# Patient Record
Sex: MALE | Race: ASIAN | NOT HISPANIC OR LATINO | URBAN - METROPOLITAN AREA
[De-identification: names, ages, dates, MRNs, and addresses within clinical notes are randomized per-mention and may not be internally consistent; named-entity substitution may affect disease eponyms.]

---

## 2017-07-12 ENCOUNTER — INPATIENT (INPATIENT)
Facility: HOSPITAL | Age: 82
LOS: 0 days | Discharge: HOME | End: 2017-07-13
Attending: INTERNAL MEDICINE | Admitting: INTERNAL MEDICINE
Payer: MEDICARE

## 2017-07-12 VITALS
OXYGEN SATURATION: 96 % | RESPIRATION RATE: 18 BRPM | TEMPERATURE: 97 F | HEART RATE: 77 BPM | DIASTOLIC BLOOD PRESSURE: 65 MMHG | SYSTOLIC BLOOD PRESSURE: 169 MMHG

## 2017-07-12 DIAGNOSIS — Z92.21 PERSONAL HISTORY OF ANTINEOPLASTIC CHEMOTHERAPY: ICD-10-CM

## 2017-07-12 PROCEDURE — 93010 ELECTROCARDIOGRAM REPORT: CPT

## 2017-07-12 RX ORDER — DEXTROSE 50 % IN WATER 50 %
25 SYRINGE (ML) INTRAVENOUS ONCE
Qty: 0 | Refills: 0 | Status: DISCONTINUED | OUTPATIENT
Start: 2017-07-12 | End: 2017-07-13

## 2017-07-12 RX ORDER — DIPHENHYDRAMINE HCL 50 MG
50 CAPSULE ORAL ONCE
Qty: 0 | Refills: 0 | Status: COMPLETED | OUTPATIENT
Start: 2017-07-12 | End: 2017-07-12

## 2017-07-12 RX ORDER — PANTOPRAZOLE SODIUM 20 MG/1
40 TABLET, DELAYED RELEASE ORAL
Qty: 0 | Refills: 0 | Status: DISCONTINUED | OUTPATIENT
Start: 2017-07-12 | End: 2017-07-13

## 2017-07-12 RX ORDER — INSULIN LISPRO 100/ML
VIAL (ML) SUBCUTANEOUS
Qty: 0 | Refills: 0 | Status: DISCONTINUED | OUTPATIENT
Start: 2017-07-12 | End: 2017-07-13

## 2017-07-12 RX ORDER — ATORVASTATIN CALCIUM 80 MG/1
20 TABLET, FILM COATED ORAL AT BEDTIME
Qty: 0 | Refills: 0 | Status: DISCONTINUED | OUTPATIENT
Start: 2017-07-12 | End: 2017-07-13

## 2017-07-12 RX ORDER — SODIUM CHLORIDE 9 MG/ML
500 INJECTION INTRAMUSCULAR; INTRAVENOUS; SUBCUTANEOUS
Qty: 0 | Refills: 0 | Status: DISCONTINUED | OUTPATIENT
Start: 2017-07-12 | End: 2017-07-13

## 2017-07-12 RX ORDER — HEPARIN SODIUM 5000 [USP'U]/ML
5000 INJECTION INTRAVENOUS; SUBCUTANEOUS EVERY 12 HOURS
Qty: 0 | Refills: 0 | Status: DISCONTINUED | OUTPATIENT
Start: 2017-07-12 | End: 2017-07-13

## 2017-07-12 RX ORDER — RANOLAZINE 500 MG/1
1000 TABLET, FILM COATED, EXTENDED RELEASE ORAL
Qty: 0 | Refills: 0 | Status: DISCONTINUED | OUTPATIENT
Start: 2017-07-12 | End: 2017-07-13

## 2017-07-12 RX ORDER — FOLIC ACID 0.8 MG
1 TABLET ORAL DAILY
Qty: 0 | Refills: 0 | Status: DISCONTINUED | OUTPATIENT
Start: 2017-07-12 | End: 2017-07-13

## 2017-07-12 RX ORDER — TENOFOVIR DISOPROXIL FUMARATE 300 MG/1
300 TABLET, FILM COATED ORAL DAILY
Qty: 0 | Refills: 0 | Status: DISCONTINUED | OUTPATIENT
Start: 2017-07-12 | End: 2017-07-13

## 2017-07-12 RX ORDER — TICAGRELOR 90 MG/1
60 TABLET ORAL
Qty: 0 | Refills: 0 | Status: DISCONTINUED | OUTPATIENT
Start: 2017-07-12 | End: 2017-07-13

## 2017-07-12 RX ORDER — TAMSULOSIN HYDROCHLORIDE 0.4 MG/1
0.4 CAPSULE ORAL AT BEDTIME
Qty: 0 | Refills: 0 | Status: DISCONTINUED | OUTPATIENT
Start: 2017-07-12 | End: 2017-07-13

## 2017-07-12 RX ORDER — SODIUM CHLORIDE 9 MG/ML
3 INJECTION INTRAMUSCULAR; INTRAVENOUS; SUBCUTANEOUS EVERY 8 HOURS
Qty: 0 | Refills: 0 | Status: DISCONTINUED | OUTPATIENT
Start: 2017-07-12 | End: 2017-07-13

## 2017-07-12 RX ORDER — HYDROCORTISONE 20 MG
100 TABLET ORAL ONCE
Qty: 0 | Refills: 0 | Status: COMPLETED | OUTPATIENT
Start: 2017-07-12 | End: 2017-07-12

## 2017-07-12 RX ORDER — SODIUM CHLORIDE 9 MG/ML
1000 INJECTION, SOLUTION INTRAVENOUS
Qty: 0 | Refills: 0 | Status: DISCONTINUED | OUTPATIENT
Start: 2017-07-12 | End: 2017-07-13

## 2017-07-12 RX ORDER — DIPHENHYDRAMINE HCL 50 MG
25 CAPSULE ORAL ONCE
Qty: 0 | Refills: 0 | Status: DISCONTINUED | OUTPATIENT
Start: 2017-07-12 | End: 2017-07-12

## 2017-07-12 RX ORDER — INSULIN LISPRO 100/ML
VIAL (ML) SUBCUTANEOUS AT BEDTIME
Qty: 0 | Refills: 0 | Status: DISCONTINUED | OUTPATIENT
Start: 2017-07-12 | End: 2017-07-13

## 2017-07-12 RX ORDER — ASPIRIN/CALCIUM CARB/MAGNESIUM 324 MG
81 TABLET ORAL DAILY
Qty: 0 | Refills: 0 | Status: DISCONTINUED | OUTPATIENT
Start: 2017-07-12 | End: 2017-07-13

## 2017-07-12 RX ORDER — ALLOPURINOL 300 MG
100 TABLET ORAL DAILY
Qty: 0 | Refills: 0 | Status: DISCONTINUED | OUTPATIENT
Start: 2017-07-12 | End: 2017-07-13

## 2017-07-12 RX ORDER — FUROSEMIDE 40 MG
20 TABLET ORAL DAILY
Qty: 0 | Refills: 0 | Status: DISCONTINUED | OUTPATIENT
Start: 2017-07-12 | End: 2017-07-13

## 2017-07-12 RX ORDER — ACETAMINOPHEN 500 MG
650 TABLET ORAL EVERY 6 HOURS
Qty: 0 | Refills: 0 | Status: DISCONTINUED | OUTPATIENT
Start: 2017-07-12 | End: 2017-07-13

## 2017-07-12 RX ORDER — DEXTROSE 50 % IN WATER 50 %
12.5 SYRINGE (ML) INTRAVENOUS ONCE
Qty: 0 | Refills: 0 | Status: DISCONTINUED | OUTPATIENT
Start: 2017-07-12 | End: 2017-07-13

## 2017-07-12 RX ORDER — DEXTROSE 50 % IN WATER 50 %
1 SYRINGE (ML) INTRAVENOUS ONCE
Qty: 0 | Refills: 0 | Status: DISCONTINUED | OUTPATIENT
Start: 2017-07-12 | End: 2017-07-13

## 2017-07-12 RX ORDER — AMLODIPINE BESYLATE 2.5 MG/1
5 TABLET ORAL DAILY
Qty: 0 | Refills: 0 | Status: DISCONTINUED | OUTPATIENT
Start: 2017-07-12 | End: 2017-07-13

## 2017-07-12 RX ORDER — METOPROLOL TARTRATE 50 MG
25 TABLET ORAL
Qty: 0 | Refills: 0 | Status: DISCONTINUED | OUTPATIENT
Start: 2017-07-12 | End: 2017-07-13

## 2017-07-12 RX ORDER — GLUCAGON INJECTION, SOLUTION 0.5 MG/.1ML
1 INJECTION, SOLUTION SUBCUTANEOUS ONCE
Qty: 0 | Refills: 0 | Status: DISCONTINUED | OUTPATIENT
Start: 2017-07-12 | End: 2017-07-13

## 2017-07-12 RX ADMIN — RANOLAZINE 1000 MILLIGRAM(S): 500 TABLET, FILM COATED, EXTENDED RELEASE ORAL at 22:09

## 2017-07-12 RX ADMIN — Medication 100 MILLIGRAM(S): at 18:25

## 2017-07-12 RX ADMIN — Medication 25 MILLIGRAM(S): at 19:35

## 2017-07-12 RX ADMIN — TAMSULOSIN HYDROCHLORIDE 0.4 MILLIGRAM(S): 0.4 CAPSULE ORAL at 22:09

## 2017-07-12 RX ADMIN — Medication 50 MILLIGRAM(S): at 18:25

## 2017-07-12 RX ADMIN — TICAGRELOR 60 MILLIGRAM(S): 90 TABLET ORAL at 22:09

## 2017-07-12 RX ADMIN — SODIUM CHLORIDE 60 MILLILITER(S): 9 INJECTION INTRAMUSCULAR; INTRAVENOUS; SUBCUTANEOUS at 19:15

## 2017-07-12 RX ADMIN — SODIUM CHLORIDE 3 MILLILITER(S): 9 INJECTION INTRAMUSCULAR; INTRAVENOUS; SUBCUTANEOUS at 21:23

## 2017-07-12 RX ADMIN — ATORVASTATIN CALCIUM 20 MILLIGRAM(S): 80 TABLET, FILM COATED ORAL at 22:09

## 2017-07-12 NOTE — H&P CARDIOLOGY - RS GEN PE MLT RESP DETAILS PC
good air movement/no chest wall tenderness/breath sounds equal/airway patent/respirations non-labored/clear to auscultation bilaterally

## 2017-07-12 NOTE — H&P CARDIOLOGY - HISTORY OF PRESENT ILLNESS
81 y/o M w/ PMH of HTN, HLD, HFpEF 66%, BPH, CKD, ?Hep. B, CAD and DM being transferred to [facility] from [facility] for cardiac catheretization. Pt was admitted for worsening SOB with acute CHF exacerbation and was diuresed with IV Lasix. While being admitted pt had episode of NSVT which was worked up with a NST and TTE. Pt's echocardiogram showed EF 66%, normal LV function, mild LVH, Grade 1 diastolic dysfunction, sclerotic aortic valve, mild MR and a small pericardial effusion. Pt's NST showed ischemia in the apical segment suggesting ischemia in the LAD region. Pt denies N/V/D, fevers, chills, cough, palpitations, chest pain, substernal distress, syncope, cyanosis, heart murmurs, varicosities, phlebitis, claudication.

## 2017-07-12 NOTE — H&P CARDIOLOGY - PMH
CHF (congestive heart failure)    CKD (chronic kidney disease)    Diabetes mellitus    HTN (hypertension)

## 2017-07-12 NOTE — H&P CARDIOLOGY - NEGATIVE NEUROLOGICAL SYMPTOMS
no transient paralysis/no loss of sensation/no weakness/no loss of consciousness/no hemiparesis/no difficulty walking/no paresthesias/no facial palsy/no vertigo/no generalized seizures/no headache/no focal seizures/no tremors/no confusion/no syncope

## 2017-07-12 NOTE — CHART NOTE - NSCHARTNOTEFT_GEN_A_CORE
83 y/o male s/p cardiac cath for right groin check. Pt currently without any complaints.    Right groin: Site with dressing in place, c/d/i. No swelling/edema, coldness of extremity, or discoloration of extremity. Pulses and sensation intact.    Will cont to monitor

## 2017-07-13 VITALS — WEIGHT: 143.3 LBS

## 2017-07-13 LAB
BUN SERPL-MCNC: 27 MG/DL — HIGH (ref 7–23)
CALCIUM SERPL-MCNC: 8.7 MG/DL — SIGNIFICANT CHANGE UP (ref 8.4–10.5)
CHLORIDE SERPL-SCNC: 102 MMOL/L — SIGNIFICANT CHANGE UP (ref 98–107)
CO2 SERPL-SCNC: 13 MMOL/L — LOW (ref 22–31)
CREAT SERPL-MCNC: 1.45 MG/DL — HIGH (ref 0.5–1.3)
GLUCOSE SERPL-MCNC: 97 MG/DL — SIGNIFICANT CHANGE UP (ref 70–99)
HCT VFR BLD CALC: 38.5 % — LOW (ref 39–50)
HGB BLD-MCNC: 12.3 G/DL — LOW (ref 13–17)
MAGNESIUM SERPL-MCNC: 2.3 MG/DL — SIGNIFICANT CHANGE UP (ref 1.6–2.6)
MCHC RBC-ENTMCNC: 31.9 % — LOW (ref 32–36)
MCHC RBC-ENTMCNC: 33.7 PG — SIGNIFICANT CHANGE UP (ref 27–34)
MCV RBC AUTO: 105.5 FL — HIGH (ref 80–100)
NRBC # FLD: 0 — SIGNIFICANT CHANGE UP
PHOSPHATE SERPL-MCNC: 2.9 MG/DL — SIGNIFICANT CHANGE UP (ref 2.5–4.5)
PLATELET # BLD AUTO: 222 K/UL — SIGNIFICANT CHANGE UP (ref 150–400)
PMV BLD: 11.1 FL — SIGNIFICANT CHANGE UP (ref 7–13)
POTASSIUM SERPL-MCNC: 4.8 MMOL/L — SIGNIFICANT CHANGE UP (ref 3.5–5.3)
POTASSIUM SERPL-SCNC: 4.8 MMOL/L — SIGNIFICANT CHANGE UP (ref 3.5–5.3)
RBC # BLD: 3.65 M/UL — LOW (ref 4.2–5.8)
RBC # FLD: 13.3 % — SIGNIFICANT CHANGE UP (ref 10.3–14.5)
SODIUM SERPL-SCNC: 137 MMOL/L — SIGNIFICANT CHANGE UP (ref 135–145)
WBC # BLD: 8.73 K/UL — SIGNIFICANT CHANGE UP (ref 3.8–10.5)
WBC # FLD AUTO: 8.73 K/UL — SIGNIFICANT CHANGE UP (ref 3.8–10.5)

## 2017-07-13 RX ORDER — ACETAMINOPHEN 500 MG
2 TABLET ORAL
Qty: 0 | Refills: 0 | COMMUNITY
Start: 2017-07-13

## 2017-07-13 RX ORDER — LOSARTAN POTASSIUM 100 MG/1
1 TABLET, FILM COATED ORAL
Qty: 0 | Refills: 0 | COMMUNITY

## 2017-07-13 RX ADMIN — SODIUM CHLORIDE 3 MILLILITER(S): 9 INJECTION INTRAMUSCULAR; INTRAVENOUS; SUBCUTANEOUS at 13:00

## 2017-07-13 RX ADMIN — Medication 25 MILLIGRAM(S): at 06:08

## 2017-07-13 RX ADMIN — PANTOPRAZOLE SODIUM 40 MILLIGRAM(S): 20 TABLET, DELAYED RELEASE ORAL at 06:08

## 2017-07-13 RX ADMIN — Medication 1 MILLIGRAM(S): at 12:05

## 2017-07-13 RX ADMIN — Medication: at 12:45

## 2017-07-13 RX ADMIN — TICAGRELOR 60 MILLIGRAM(S): 90 TABLET ORAL at 06:08

## 2017-07-13 RX ADMIN — Medication 81 MILLIGRAM(S): at 12:05

## 2017-07-13 RX ADMIN — RANOLAZINE 1000 MILLIGRAM(S): 500 TABLET, FILM COATED, EXTENDED RELEASE ORAL at 10:09

## 2017-07-13 RX ADMIN — AMLODIPINE BESYLATE 5 MILLIGRAM(S): 2.5 TABLET ORAL at 06:08

## 2017-07-13 RX ADMIN — Medication 20 MILLIGRAM(S): at 06:08

## 2017-07-13 RX ADMIN — TENOFOVIR DISOPROXIL FUMARATE 300 MILLIGRAM(S): 300 TABLET, FILM COATED ORAL at 12:05

## 2017-07-13 RX ADMIN — HEPARIN SODIUM 5000 UNIT(S): 5000 INJECTION INTRAVENOUS; SUBCUTANEOUS at 06:08

## 2017-07-13 RX ADMIN — Medication 100 MILLIGRAM(S): at 12:05

## 2017-07-13 RX ADMIN — SODIUM CHLORIDE 3 MILLILITER(S): 9 INJECTION INTRAMUSCULAR; INTRAVENOUS; SUBCUTANEOUS at 06:10

## 2017-07-13 NOTE — DISCHARGE NOTE ADULT - MEDICATION SUMMARY - MEDICATIONS TO TAKE
I will START or STAY ON the medications listed below when I get home from the hospital:    aspirin 81 mg oral tablet  -- 1 tab(s) by mouth once a day  -- Indication: For Coronary artery disease     acetaminophen 325 mg oral tablet  -- 2 tab(s) by mouth every 6 hours, As needed, Mild, Moderate and Severe Pain  -- Indication: For Pain    tamsulosin 0.4 mg oral capsule  -- 1 cap(s) by mouth once a day  -- Indication: For BPH    ranolazine 1000 mg oral tablet, extended release  -- 1 tab(s) by mouth 2 times a day  -- Indication: For Antianginal     glimepiride 1 mg oral tablet  -- 1 tab(s) by mouth 2 times a day  -- Indication: For Diabetes mellitus    allopurinol 100 mg oral tablet  -- 1 tab(s) by mouth once a day  -- Indication: For Gout    atorvastatin 20 mg oral tablet  -- 1 tab(s) by mouth once a day  -- Indication: For Hyperlipidemia     Brilinta (ticagrelor) 60 mg oral tablet  -- 1 tab(s) by mouth 2 times a day  -- Indication: For Coronary artery disease     tenofovir 300 mg oral tablet  -- 1 tab(s) by mouth once a day  -- Indication: For Antiviral     metoprolol tartrate 25 mg oral tablet  -- 1 tab(s) by mouth 2 times a day  -- Indication: For HTN (hypertension)    amLODIPine 5 mg oral tablet  -- 1 tab(s) by mouth once a day  -- Indication: For HTN (hypertension)    Lasix 20 mg oral tablet  -- 1 tab(s) by mouth once a day  -- Indication: For CHF (congestive heart failure)    azelastine 0.05% ophthalmic solution  -- 1 drop(s) to each affected eye 2 times a day  -- Indication: For Eye drop    pantoprazole 40 mg oral delayed release tablet  -- 1 tab(s) by mouth once a day  -- Indication: For GERD    folic acid 1 mg oral tablet  -- 1 tab(s) by mouth once a day  -- Indication: For Vitamin

## 2017-07-13 NOTE — DISCHARGE NOTE ADULT - HOSPITAL COURSE
83 y/o M w/ PMH of HTN, HLD, HFpEF 66%, BPH, CKD, ?Hep. B, CAD and DM being transferred to [facility] from [facility] for cardiac catheterization.    + 7/12 s/p LHC: LAD patent stents, RCA patent stents, RFA accessed    Cr: 1.5  LHC: LAD patent stents, RCA patent stents, RFA accessed    7/13 Med:   continue rest of medical management  bleeding precaution  rehab PT  social work - discharge planning    7/13/17 Pt is medically stable for discharge home today as per Dr. Rebollar. 81 y/o M w/ PMH of HTN, HLD, HFpEF 66%, BPH, CKD, ?Hep. B, CAD and DM being transferred to [facility] from [facility] for cardiac catheterization.    + 7/12 s/p LHC: LAD patent stents, RCA patent stents, RFA accessed.    Trend Cr s/p LHC    Pt had allergic reaction to contrast s/p LHC(Given 50mg IV Benadryl and 100mg IV Solucortef), now resolved.     Cr: 1.5  LHC: LAD patent stents, RCA patent stents, RFA accessed    7/13 Med:   continue rest of medical management  bleeding precaution  rehab PT  social work - discharge planning.    7/13/17 Pt is medically stable for discharge home today as per Dr. Rebollar.

## 2017-07-13 NOTE — PROGRESS NOTE ADULT - SUBJECTIVE AND OBJECTIVE BOX
Patient is a 82y old  Male who presents with a chief complaint of  chest pain     Subjective:  Patient feeling much better  s/p angiogram - tolerated well  cardiac monitor - NSR       MEDICATIONS  (STANDING):  sodium chloride 0.9% lock flush 3 milliLiter(s) IV Push every 8 hours  aspirin enteric coated 81 milliGRAM(s) Oral daily  tamsulosin 0.4 milliGRAM(s) Oral at bedtime  ranolazine 1000 milliGRAM(s) Oral two times a day  allopurinol 100 milliGRAM(s) Oral daily  atorvastatin 20 milliGRAM(s) Oral at bedtime  ticagrelor 60 milliGRAM(s) Oral two times a day  tenofovir 300 milliGRAM(s) Oral daily  metoprolol 25 milliGRAM(s) Oral two times a day  amLODIPine   Tablet 5 milliGRAM(s) Oral daily  furosemide   Injectable 20 milliGRAM(s) IV Push daily  pantoprazole    Tablet 40 milliGRAM(s) Oral before breakfast  folic acid 1 milliGRAM(s) Oral daily  insulin lispro (HumaLOG) corrective regimen sliding scale   SubCutaneous three times a day before meals  insulin lispro (HumaLOG) corrective regimen sliding scale   SubCutaneous at bedtime  dextrose 5%. 1000 milliLiter(s) (50 mL/Hr) IV Continuous <Continuous>  dextrose 50% Injectable 12.5 Gram(s) IV Push once  dextrose 50% Injectable 25 Gram(s) IV Push once  dextrose 50% Injectable 25 Gram(s) IV Push once  heparin  Injectable 5000 Unit(s) SubCutaneous every 12 hours  sodium chloride 0.9%. 500 milliLiter(s) (60 mL/Hr) IV Continuous <Continuous>    MEDICATIONS  (PRN):  dextrose Gel 1 Dose(s) Oral once PRN Blood Glucose LESS THAN 70 milliGRAM(s)/deciliter  glucagon  Injectable 1 milliGRAM(s) IntraMuscular once PRN Glucose LESS THAN 70 milligrams/deciliter  acetaminophen   Tablet. 650 milliGRAM(s) Oral every 6 hours PRN Mild, Moderate and Severe Pain      Vital Signs Last 24 Hrs  T(C): 36.7 (07-13-17 @ 11:25), Max: 36.7 (07-13-17 @ 11:25)  HR: 70 (07-13-17 @ 11:25) (70 - 77)  BP: 122/60 (07-13-17 @ 11:25) (122/60 - 169/65)  RR: 18 (07-13-17 @ 11:25) (18 - 18)  SpO2: 98% (07-13-17 @ 11:25) (96% - 98%)  CAPILLARY BLOOD GLUCOSE  159 (13 Jul 2017 11:25)  134 (13 Jul 2017 08:33)  195 (12 Jul 2017 22:14)        I&O's Summary    12 Jul 2017 07:01  -  13 Jul 2017 07:00  --------------------------------------------------------  IN: 360 mL / OUT: 600 mL / NET: -240 mL    13 Jul 2017 07:01  -  13 Jul 2017 13:55  --------------------------------------------------------  IN: 120 mL / OUT: 0 mL / NET: 120 mL        PHYSICAL EXAM:  GENERAL: NAD, well-developed  HEAD:  Atraumatic, Normocephalic  EYES: EOMI, PERRLA, conjunctiva and sclera clear  NECK: Supple, No JVD  CHEST/LUNG: Clear to auscultation bilaterally; No wheeze  HEART: Regular rate and rhythm; No murmurs, rubs, or gallops  ABDOMEN: Soft, Nontender, Nondistended; Bowel sounds present  EXTREMITIES:  2+ Peripheral Pulses, No clubbing, cyanosis, or edema  PSYCH: AAOx3  NEUROLOGY: non-focal  SKIN: No rashes or lesions    LABS:                        12.3   8.73  )-----------( 222      ( 13 Jul 2017 07:56 )             38.5     07-13    137  |  102  |  27<H>  ----------------------------<  97  4.8   |  13<L>  |  1.45<H>    Ca    8.7      13 Jul 2017 07:56  Phos  2.9     07-13  Mg     2.3     07-13        CAPILLARY BLOOD GLUCOSE  159 (13 Jul 2017 11:25)  134 (13 Jul 2017 08:33)  195 (12 Jul 2017 22:14)    Patient is a 82y old  Male who presents with a chief complaint of     SUBJECTIVE / OVERNIGHT EVENTS:   Feels better.  Denies CP/SOB/Palpitation/HA.    MEDICATIONS  (STANDING):  sodium chloride 0.9% lock flush 3 milliLiter(s) IV Push every 8 hours  aspirin enteric coated 81 milliGRAM(s) Oral daily  tamsulosin 0.4 milliGRAM(s) Oral at bedtime  ranolazine 1000 milliGRAM(s) Oral two times a day  allopurinol 100 milliGRAM(s) Oral daily  atorvastatin 20 milliGRAM(s) Oral at bedtime  ticagrelor 60 milliGRAM(s) Oral two times a day  tenofovir 300 milliGRAM(s) Oral daily  metoprolol 25 milliGRAM(s) Oral two times a day  amLODIPine   Tablet 5 milliGRAM(s) Oral daily  furosemide   Injectable 20 milliGRAM(s) IV Push daily  pantoprazole    Tablet 40 milliGRAM(s) Oral before breakfast  folic acid 1 milliGRAM(s) Oral daily  insulin lispro (HumaLOG) corrective regimen sliding scale   SubCutaneous three times a day before meals  insulin lispro (HumaLOG) corrective regimen sliding scale   SubCutaneous at bedtime  dextrose 5%. 1000 milliLiter(s) (50 mL/Hr) IV Continuous <Continuous>  dextrose 50% Injectable 12.5 Gram(s) IV Push once  dextrose 50% Injectable 25 Gram(s) IV Push once  dextrose 50% Injectable 25 Gram(s) IV Push once  heparin  Injectable 5000 Unit(s) SubCutaneous every 12 hours  sodium chloride 0.9%. 500 milliLiter(s) (60 mL/Hr) IV Continuous <Continuous>    MEDICATIONS  (PRN):  dextrose Gel 1 Dose(s) Oral once PRN Blood Glucose LESS THAN 70 milliGRAM(s)/deciliter  glucagon  Injectable 1 milliGRAM(s) IntraMuscular once PRN Glucose LESS THAN 70 milligrams/deciliter  acetaminophen   Tablet. 650 milliGRAM(s) Oral every 6 hours PRN Mild, Moderate and Severe Pain      Vital Signs Last 24 Hrs  T(C): 36.7 (07-13-17 @ 11:25), Max: 36.7 (07-13-17 @ 11:25)  HR: 70 (07-13-17 @ 11:25) (70 - 77)  BP: 122/60 (07-13-17 @ 11:25) (122/60 - 169/65)  RR: 18 (07-13-17 @ 11:25) (18 - 18)  SpO2: 98% (07-13-17 @ 11:25) (96% - 98%)  CAPILLARY BLOOD GLUCOSE  159 (13 Jul 2017 11:25)  134 (13 Jul 2017 08:33)  195 (12 Jul 2017 22:14)        I&O's Summary    12 Jul 2017 07:01  -  13 Jul 2017 07:00  --------------------------------------------------------  IN: 360 mL / OUT: 600 mL / NET: -240 mL    13 Jul 2017 07:01  -  13 Jul 2017 13:55  --------------------------------------------------------  IN: 120 mL / OUT: 0 mL / NET: 120 mL        PHYSICAL EXAM:  GENERAL: NAD, well-developed  HEAD:  Atraumatic, Normocephalic  EYES: EOMI, PERRLA, conjunctiva and sclera clear  NECK: Supple, No JVD  CHEST/LUNG: Clear to auscultation bilaterally; No wheeze  HEART: Regular rate and rhythm; No murmurs, rubs, or gallops  ABDOMEN: Soft, Nontender, Nondistended; Bowel sounds present  EXTREMITIES:  2+ Peripheral Pulses, No clubbing, cyanosis, or edema  PSYCH: AAOx3  NEUROLOGY: non-focal  SKIN: No rashes or lesions    LABS:                        12.3   8.73  )-----------( 222      ( 13 Jul 2017 07:56 )             38.5     07-13    137  |  102  |  27<H>  ----------------------------<  97  4.8   |  13<L>  |  1.45<H>    Ca    8.7      13 Jul 2017 07:56  Phos  2.9     07-13  Mg     2.3     07-13              CAPILLARY BLOOD GLUCOSE  159 (13 Jul 2017 11:25)  134 (13 Jul 2017 08:33)  195 (12 Jul 2017 22:14)          RADIOLOGY & ADDITIONAL TESTS:    Imaging Personally Reviewed:    Consultant(s) Notes Reviewed:      Care Discussed with Consultants/Other Providers:        RADIOLOGY & ADDITIONAL TESTS:    Imaging Personally Reviewed:    Consultant(s) Notes Reviewed:      Care Discussed with Consultants/Other Providers: Patient is a 82y old  Male who presents with a chief complaint of  chest pain     Subjective:  Patient feeling much better  s/p angiogram - tolerated well  cardiac monitor - NSR       MEDICATIONS  (STANDING):  sodium chloride 0.9% lock flush 3 milliLiter(s) IV Push every 8 hours  aspirin enteric coated 81 milliGRAM(s) Oral daily  tamsulosin 0.4 milliGRAM(s) Oral at bedtime  ranolazine 1000 milliGRAM(s) Oral two times a day  allopurinol 100 milliGRAM(s) Oral daily  atorvastatin 20 milliGRAM(s) Oral at bedtime  ticagrelor 60 milliGRAM(s) Oral two times a day  tenofovir 300 milliGRAM(s) Oral daily  metoprolol 25 milliGRAM(s) Oral two times a day  amLODIPine   Tablet 5 milliGRAM(s) Oral daily  furosemide   Injectable 20 milliGRAM(s) IV Push daily  pantoprazole    Tablet 40 milliGRAM(s) Oral before breakfast  folic acid 1 milliGRAM(s) Oral daily  insulin lispro (HumaLOG) corrective regimen sliding scale   SubCutaneous three times a day before meals  insulin lispro (HumaLOG) corrective regimen sliding scale   SubCutaneous at bedtime  dextrose 5%. 1000 milliLiter(s) (50 mL/Hr) IV Continuous <Continuous>  dextrose 50% Injectable 12.5 Gram(s) IV Push once  dextrose 50% Injectable 25 Gram(s) IV Push once  dextrose 50% Injectable 25 Gram(s) IV Push once  heparin  Injectable 5000 Unit(s) SubCutaneous every 12 hours  sodium chloride 0.9%. 500 milliLiter(s) (60 mL/Hr) IV Continuous <Continuous>    MEDICATIONS  (PRN):  dextrose Gel 1 Dose(s) Oral once PRN Blood Glucose LESS THAN 70 milliGRAM(s)/deciliter  glucagon  Injectable 1 milliGRAM(s) IntraMuscular once PRN Glucose LESS THAN 70 milligrams/deciliter  acetaminophen   Tablet. 650 milliGRAM(s) Oral every 6 hours PRN Mild, Moderate and Severe Pain      Vital Signs Last 24 Hrs  T(C): 36.7 (07-13-17 @ 11:25), Max: 36.7 (07-13-17 @ 11:25)  HR: 70 (07-13-17 @ 11:25) (70 - 77)  BP: 122/60 (07-13-17 @ 11:25) (122/60 - 169/65)  RR: 18 (07-13-17 @ 11:25) (18 - 18)  SpO2: 98% (07-13-17 @ 11:25) (96% - 98%)  CAPILLARY BLOOD GLUCOSE  159 (13 Jul 2017 11:25)  134 (13 Jul 2017 08:33)  195 (12 Jul 2017 22:14)        I&O's Summary    12 Jul 2017 07:01  -  13 Jul 2017 07:00  --------------------------------------------------------  IN: 360 mL / OUT: 600 mL / NET: -240 mL    13 Jul 2017 07:01  -  13 Jul 2017 13:55  --------------------------------------------------------  IN: 120 mL / OUT: 0 mL / NET: 120 mL        PHYSICAL EXAM:  GENERAL: NAD, well-developed  HEAD:  Atraumatic, Normocephalic  EYES: EOMI, PERRLA, conjunctiva and sclera clear  NECK: Supple, No JVD  CHEST/LUNG: Clear to auscultation bilaterally; No wheeze  HEART: Regular rate and rhythm; No murmurs, rubs, or gallops  ABDOMEN: Soft, Nontender, Nondistended; Bowel sounds present  EXTREMITIES:  2+ Peripheral Pulses, No clubbing, cyanosis, or edema  PSYCH: AAOx3  NEUROLOGY: non-focal  SKIN: No rashes or lesions    LABS:                        12.3   8.73  )-----------( 222      ( 13 Jul 2017 07:56 )             38.5     07-13    137  |  102  |  27<H>  ----------------------------<  97  4.8   |  13<L>  |  1.45<H>    Ca    8.7      13 Jul 2017 07:56  Phos  2.9     07-13  Mg     2.3     07-13        CAPILLARY BLOOD GLUCOSE  159 (13 Jul 2017 11:25)  134 (13 Jul 2017 08:33)  195 (12 Jul 2017 22:14)    Patient is a 82y old  Male who presents with a chief complaint of     SUBJECTIVE / OVERNIGHT EVENTS:   Feels better.  Denies CP/SOB/Palpitation/HA.    MEDICATIONS  (STANDING):  sodium chloride 0.9% lock flush 3 milliLiter(s) IV Push every 8 hours  aspirin enteric coated 81 milliGRAM(s) Oral daily  tamsulosin 0.4 milliGRAM(s) Oral at bedtime  ranolazine 1000 milliGRAM(s) Oral two times a day  allopurinol 100 milliGRAM(s) Oral daily  atorvastatin 20 milliGRAM(s) Oral at bedtime  ticagrelor 60 milliGRAM(s) Oral two times a day  tenofovir 300 milliGRAM(s) Oral daily  metoprolol 25 milliGRAM(s) Oral two times a day  amLODIPine   Tablet 5 milliGRAM(s) Oral daily  furosemide   Injectable 20 milliGRAM(s) IV Push daily  pantoprazole    Tablet 40 milliGRAM(s) Oral before breakfast  folic acid 1 milliGRAM(s) Oral daily  insulin lispro (HumaLOG) corrective regimen sliding scale   SubCutaneous three times a day before meals  insulin lispro (HumaLOG) corrective regimen sliding scale   SubCutaneous at bedtime  dextrose 5%. 1000 milliLiter(s) (50 mL/Hr) IV Continuous <Continuous>  dextrose 50% Injectable 12.5 Gram(s) IV Push once  dextrose 50% Injectable 25 Gram(s) IV Push once  dextrose 50% Injectable 25 Gram(s) IV Push once  heparin  Injectable 5000 Unit(s) SubCutaneous every 12 hours  sodium chloride 0.9%. 500 milliLiter(s) (60 mL/Hr) IV Continuous <Continuous>    MEDICATIONS  (PRN):  dextrose Gel 1 Dose(s) Oral once PRN Blood Glucose LESS THAN 70 milliGRAM(s)/deciliter  glucagon  Injectable 1 milliGRAM(s) IntraMuscular once PRN Glucose LESS THAN 70 milligrams/deciliter  acetaminophen   Tablet. 650 milliGRAM(s) Oral every 6 hours PRN Mild, Moderate and Severe Pain      Vital Signs Last 24 Hrs  T(C): 36.7 (07-13-17 @ 11:25), Max: 36.7 (07-13-17 @ 11:25)  HR: 70 (07-13-17 @ 11:25) (70 - 77)  BP: 122/60 (07-13-17 @ 11:25) (122/60 - 169/65)  RR: 18 (07-13-17 @ 11:25) (18 - 18)  SpO2: 98% (07-13-17 @ 11:25) (96% - 98%)  CAPILLARY BLOOD GLUCOSE  159 (13 Jul 2017 11:25)  134 (13 Jul 2017 08:33)  195 (12 Jul 2017 22:14)        I&O's Summary    12 Jul 2017 07:01  -  13 Jul 2017 07:00  --------------------------------------------------------  IN: 360 mL / OUT: 600 mL / NET: -240 mL    13 Jul 2017 07:01  -  13 Jul 2017 13:55  --------------------------------------------------------  IN: 120 mL / OUT: 0 mL / NET: 120 mL        PHYSICAL EXAM:  GENERAL: NAD, well-developed  HEAD:  Atraumatic, Normocephalic  EYES: EOMI, PERRLA, conjunctiva and sclera clear  NECK: Supple, No JVD  CHEST/LUNG: Clear to auscultation bilaterally; No wheeze  HEART: Regular rate and rhythm; No murmurs, rubs, or gallops  ABDOMEN: Soft, Nontender, Nondistended; Bowel sounds present  EXTREMITIES:  2+ Peripheral Pulses, No clubbing, cyanosis, or edema  PSYCH: AAOx3  NEUROLOGY: non-focal  SKIN: No rashes or lesions    LABS:                        12.3   8.73  )-----------( 222      ( 13 Jul 2017 07:56 )             38.5     07-13    137  |  102  |  27<H>  ----------------------------<  97  4.8   |  13<L>  |  1.45<H>  < from: Cardiac Cath Lab - Adult (07.12.17 @ 17:33) >      < end of copied text >  < from: Cardiac Cath Lab - Adult (07.12.17 @ 17:33) >      < end of copied text >  < from: Cardiac Cath Lab - Adult (07.12.17 @ 17:33) >      < end of copied text >    Ca    8.7      13 Jul 2017 07:56  Phos  2.9     07-13  Mg     2.3     07-13      CAPILLARY BLOOD GLUCOSE  159 (13 Jul 2017 11:25)  134 (13 Jul 2017 08:33)  195 (12 Jul 2017 22:14)          RADIOLOGY & ADDITIONAL TESTS:    Imaging Personally Reviewed:    Consultant(s) Notes Reviewed:      Care Discussed with Consultants/Other Providers:        RADIOLOGY & ADDITIONAL TESTS:    Imaging Personally Reviewed:    Consultant(s) Notes Reviewed:      Care Discussed with Consultants/Other Providers:

## 2017-07-13 NOTE — PROGRESS NOTE ADULT - ASSESSMENT
82 year old male admitted at [facility] work up done  for evaluation of NS V tach  - NST showed apical Ischemia  transferred yesterday at [facility] - s/p angiogram - patent stents and no blockage as per Cardiology    CAD s/p Stent  Hypertension  hyperlipidemia Non sustained V tach  cardiac cath - negative  cardiac monitoring stable  continue all previous medications  Cardiology on the case  DM   fingerstick and coverage   hypoglycemia precaution    Chronic Hepatitis  continue antiviral    BPH  stable on flomax    continue rest of medical management  bleeding precaution  rehab PT  social work - discharge planning    Keith Rebollar MD  July 13, 2017

## 2017-07-13 NOTE — DISCHARGE NOTE ADULT - CARE PLAN
Principal Discharge DX:	Coronary artery disease  Goal:	Prevent progression of disease.  Instructions for follow-up, activity and diet:	Continue ASA, Brilinta, current medications.   Follow-up with Dr. Rebollar within 1 week.  Secondary Diagnosis:	CHF (congestive heart failure)  Goal:	Maintain euvolemia.  Instructions for follow-up, activity and diet:	Continue Lasix.   Monitor urine output, weight, fluid intake.  Secondary Diagnosis:	HTN (hypertension)  Goal:	Reduce blood pressure.  Instructions for follow-up, activity and diet:	Continue Norvasc, Metoprolol.   Monitor your blood pressure.

## 2017-07-13 NOTE — DISCHARGE NOTE ADULT - PLAN OF CARE
Prevent progression of disease. Continue ASA, Brilinta, current medications.   Follow-up with Dr. Rebollar within 1 week. Maintain euvolemia. Continue Lasix.   Monitor urine output, weight, fluid intake. Reduce blood pressure. Continue Norvasc, Metoprolol.   Monitor your blood pressure.

## 2017-07-13 NOTE — DISCHARGE NOTE ADULT - OTHER SIGNIFICANT FINDINGS
(PMH) CKD (chronic kidney disease)  (PMH) Diabetes mellitus  (PMH) HTN (hypertension)  (PMH) CHF (congestive heart failure)  (PSH) No significant past surgical history

## 2017-07-13 NOTE — DISCHARGE NOTE ADULT - CARE PROVIDER_API CALL
Keith Rebollar), Internal Medicine  [address] [place] [address]  Phone: [phone]  Fax: [phone]

## 2017-07-13 NOTE — DISCHARGE NOTE ADULT - PATIENT PORTAL LINK FT
“You can access the [employer] Patient Portal, offered by [facility], by registering with the following website: [email or web address]”

## 2018-04-23 ENCOUNTER — INPATIENT (INPATIENT)
Facility: HOSPITAL | Age: 83
LOS: 1 days | Discharge: HOME | DRG: 92 | End: 2018-04-25
Attending: INTERNAL MEDICINE | Admitting: INTERNAL MEDICINE
Payer: MEDICARE

## 2018-04-23 VITALS
SYSTOLIC BLOOD PRESSURE: 134 MMHG | DIASTOLIC BLOOD PRESSURE: 75 MMHG | HEART RATE: 57 BPM | WEIGHT: 134.04 LBS | OXYGEN SATURATION: 99 % | HEIGHT: 67 IN | RESPIRATION RATE: 18 BRPM | TEMPERATURE: 97 F

## 2018-04-23 DIAGNOSIS — Z29.9 ENCOUNTER FOR PROPHYLACTIC MEASURES, UNSPECIFIED: ICD-10-CM

## 2018-04-23 DIAGNOSIS — I10 ESSENTIAL (PRIMARY) HYPERTENSION: ICD-10-CM

## 2018-04-23 DIAGNOSIS — N28.9 DISORDER OF KIDNEY AND URETER, UNSPECIFIED: ICD-10-CM

## 2018-04-23 DIAGNOSIS — D64.9 ANEMIA, UNSPECIFIED: ICD-10-CM

## 2018-04-23 DIAGNOSIS — N17.9 ACUTE KIDNEY FAILURE, UNSPECIFIED: ICD-10-CM

## 2018-04-23 DIAGNOSIS — E11.9 TYPE 2 DIABETES MELLITUS WITHOUT COMPLICATIONS: ICD-10-CM

## 2018-04-23 DIAGNOSIS — I25.10 ATHEROSCLEROTIC HEART DISEASE OF NATIVE CORONARY ARTERY WITHOUT ANGINA PECTORIS: ICD-10-CM

## 2018-04-23 LAB
ALBUMIN SERPL ELPH-MCNC: 3.2 G/DL — LOW (ref 3.5–5)
ALP SERPL-CCNC: 68 U/L — SIGNIFICANT CHANGE UP (ref 40–120)
ALT FLD-CCNC: 24 U/L DA — SIGNIFICANT CHANGE UP (ref 10–60)
ANION GAP SERPL CALC-SCNC: 10 MMOL/L — SIGNIFICANT CHANGE UP (ref 5–17)
APTT BLD: 28.1 SEC — SIGNIFICANT CHANGE UP (ref 27.5–37.4)
AST SERPL-CCNC: 18 U/L — SIGNIFICANT CHANGE UP (ref 10–40)
BILIRUB SERPL-MCNC: 0.5 MG/DL — SIGNIFICANT CHANGE UP (ref 0.2–1.2)
BUN SERPL-MCNC: 50 MG/DL — HIGH (ref 7–18)
CALCIUM SERPL-MCNC: 7.9 MG/DL — LOW (ref 8.4–10.5)
CHLORIDE SERPL-SCNC: 106 MMOL/L — SIGNIFICANT CHANGE UP (ref 96–108)
CK MB BLD-MCNC: 2.6 % — SIGNIFICANT CHANGE UP (ref 0–3.5)
CK MB CFR SERPL CALC: 3.1 NG/ML — SIGNIFICANT CHANGE UP (ref 0–3.6)
CK SERPL-CCNC: 118 U/L — SIGNIFICANT CHANGE UP (ref 35–232)
CO2 SERPL-SCNC: 27 MMOL/L — SIGNIFICANT CHANGE UP (ref 22–31)
CREAT SERPL-MCNC: 1.93 MG/DL — HIGH (ref 0.5–1.3)
EOSINOPHIL NFR BLD AUTO: 2 % — SIGNIFICANT CHANGE UP (ref 0–6)
ETHANOL SERPL-MCNC: <3 MG/DL — SIGNIFICANT CHANGE UP (ref 0–10)
GLUCOSE BLDC GLUCOMTR-MCNC: 83 MG/DL — SIGNIFICANT CHANGE UP (ref 70–99)
GLUCOSE SERPL-MCNC: 96 MG/DL — SIGNIFICANT CHANGE UP (ref 70–99)
HCT VFR BLD CALC: 36.4 % — LOW (ref 39–50)
HGB BLD-MCNC: 12 G/DL — LOW (ref 13–17)
INR BLD: 1 RATIO — SIGNIFICANT CHANGE UP (ref 0.88–1.16)
LYMPHOCYTES # BLD AUTO: 12 % — LOW (ref 13–44)
MCHC RBC-ENTMCNC: 32.9 GM/DL — SIGNIFICANT CHANGE UP (ref 32–36)
MCHC RBC-ENTMCNC: 33.2 PG — SIGNIFICANT CHANGE UP (ref 27–34)
MCV RBC AUTO: 101.2 FL — HIGH (ref 80–100)
MONOCYTES NFR BLD AUTO: 14 % — SIGNIFICANT CHANGE UP (ref 2–14)
NEUTROPHILS NFR BLD AUTO: 72 % — SIGNIFICANT CHANGE UP (ref 43–77)
PLATELET # BLD AUTO: 200 K/UL — SIGNIFICANT CHANGE UP (ref 150–400)
POTASSIUM SERPL-MCNC: 3.9 MMOL/L — SIGNIFICANT CHANGE UP (ref 3.5–5.3)
POTASSIUM SERPL-SCNC: 3.9 MMOL/L — SIGNIFICANT CHANGE UP (ref 3.5–5.3)
PROT SERPL-MCNC: 6.6 G/DL — SIGNIFICANT CHANGE UP (ref 6–8.3)
PROTHROM AB SERPL-ACNC: 10.9 SEC — SIGNIFICANT CHANGE UP (ref 9.8–12.7)
RBC # BLD: 3.6 M/UL — LOW (ref 4.2–5.8)
RBC # FLD: 12.2 % — SIGNIFICANT CHANGE UP (ref 10.3–14.5)
SODIUM SERPL-SCNC: 143 MMOL/L — SIGNIFICANT CHANGE UP (ref 135–145)
TROPONIN I SERPL-MCNC: 0.02 NG/ML — SIGNIFICANT CHANGE UP (ref 0–0.04)
TROPONIN I SERPL-MCNC: 0.02 NG/ML — SIGNIFICANT CHANGE UP (ref 0–0.04)
WBC # BLD: 11.8 K/UL — HIGH (ref 3.8–10.5)
WBC # FLD AUTO: 11.8 K/UL — HIGH (ref 3.8–10.5)

## 2018-04-23 PROCEDURE — 71045 X-RAY EXAM CHEST 1 VIEW: CPT | Mod: 26

## 2018-04-23 PROCEDURE — 99285 EMERGENCY DEPT VISIT HI MDM: CPT | Mod: 25

## 2018-04-23 PROCEDURE — 70450 CT HEAD/BRAIN W/O DYE: CPT | Mod: 26

## 2018-04-23 RX ORDER — INSULIN LISPRO 100/ML
VIAL (ML) SUBCUTANEOUS
Qty: 0 | Refills: 0 | Status: DISCONTINUED | OUTPATIENT
Start: 2018-04-23 | End: 2018-04-25

## 2018-04-23 RX ORDER — SODIUM CHLORIDE 9 MG/ML
1000 INJECTION INTRAMUSCULAR; INTRAVENOUS; SUBCUTANEOUS ONCE
Qty: 0 | Refills: 0 | Status: COMPLETED | OUTPATIENT
Start: 2018-04-23 | End: 2018-04-23

## 2018-04-23 RX ORDER — METOPROLOL TARTRATE 50 MG
50 TABLET ORAL
Qty: 0 | Refills: 0 | Status: DISCONTINUED | OUTPATIENT
Start: 2018-04-23 | End: 2018-04-25

## 2018-04-23 RX ORDER — RANOLAZINE 500 MG/1
1000 TABLET, FILM COATED, EXTENDED RELEASE ORAL
Qty: 0 | Refills: 0 | Status: DISCONTINUED | OUTPATIENT
Start: 2018-04-23 | End: 2018-04-25

## 2018-04-23 RX ORDER — ONDANSETRON 8 MG/1
4 TABLET, FILM COATED ORAL ONCE
Qty: 0 | Refills: 0 | Status: COMPLETED | OUTPATIENT
Start: 2018-04-23 | End: 2018-04-23

## 2018-04-23 RX ORDER — PREGABALIN 225 MG/1
1000 CAPSULE ORAL DAILY
Qty: 0 | Refills: 0 | Status: DISCONTINUED | OUTPATIENT
Start: 2018-04-23 | End: 2018-04-25

## 2018-04-23 RX ORDER — FOLIC ACID 0.8 MG
1 TABLET ORAL DAILY
Qty: 0 | Refills: 0 | Status: DISCONTINUED | OUTPATIENT
Start: 2018-04-23 | End: 2018-04-25

## 2018-04-23 RX ORDER — DEXTROSE 50 % IN WATER 50 %
12.5 SYRINGE (ML) INTRAVENOUS ONCE
Qty: 0 | Refills: 0 | Status: DISCONTINUED | OUTPATIENT
Start: 2018-04-23 | End: 2018-04-25

## 2018-04-23 RX ORDER — DEXTROSE 50 % IN WATER 50 %
25 SYRINGE (ML) INTRAVENOUS ONCE
Qty: 0 | Refills: 0 | Status: DISCONTINUED | OUTPATIENT
Start: 2018-04-23 | End: 2018-04-25

## 2018-04-23 RX ORDER — PANTOPRAZOLE SODIUM 20 MG/1
40 TABLET, DELAYED RELEASE ORAL
Qty: 0 | Refills: 0 | Status: DISCONTINUED | OUTPATIENT
Start: 2018-04-23 | End: 2018-04-25

## 2018-04-23 RX ORDER — SODIUM CHLORIDE 9 MG/ML
1000 INJECTION, SOLUTION INTRAVENOUS
Qty: 0 | Refills: 0 | Status: DISCONTINUED | OUTPATIENT
Start: 2018-04-23 | End: 2018-04-25

## 2018-04-23 RX ORDER — TAMSULOSIN HYDROCHLORIDE 0.4 MG/1
0.4 CAPSULE ORAL DAILY
Qty: 0 | Refills: 0 | Status: DISCONTINUED | OUTPATIENT
Start: 2018-04-23 | End: 2018-04-25

## 2018-04-23 RX ORDER — HEPARIN SODIUM 5000 [USP'U]/ML
5000 INJECTION INTRAVENOUS; SUBCUTANEOUS EVERY 8 HOURS
Qty: 0 | Refills: 0 | Status: DISCONTINUED | OUTPATIENT
Start: 2018-04-23 | End: 2018-04-25

## 2018-04-23 RX ORDER — AMLODIPINE BESYLATE 2.5 MG/1
5 TABLET ORAL DAILY
Qty: 0 | Refills: 0 | Status: DISCONTINUED | OUTPATIENT
Start: 2018-04-23 | End: 2018-04-25

## 2018-04-23 RX ORDER — ATORVASTATIN CALCIUM 80 MG/1
40 TABLET, FILM COATED ORAL AT BEDTIME
Qty: 0 | Refills: 0 | Status: DISCONTINUED | OUTPATIENT
Start: 2018-04-23 | End: 2018-04-25

## 2018-04-23 RX ORDER — FLUOCINONIDE/EMOLLIENT BASE 0.05 %
1 CREAM (GRAM) TOPICAL DAILY
Qty: 0 | Refills: 0 | Status: DISCONTINUED | OUTPATIENT
Start: 2018-04-23 | End: 2018-04-25

## 2018-04-23 RX ORDER — FUROSEMIDE 40 MG
40 TABLET ORAL DAILY
Qty: 0 | Refills: 0 | Status: DISCONTINUED | OUTPATIENT
Start: 2018-04-23 | End: 2018-04-25

## 2018-04-23 RX ORDER — TICAGRELOR 90 MG/1
60 TABLET ORAL
Qty: 0 | Refills: 0 | Status: DISCONTINUED | OUTPATIENT
Start: 2018-04-23 | End: 2018-04-25

## 2018-04-23 RX ORDER — DEXTROSE 50 % IN WATER 50 %
1 SYRINGE (ML) INTRAVENOUS ONCE
Qty: 0 | Refills: 0 | Status: DISCONTINUED | OUTPATIENT
Start: 2018-04-23 | End: 2018-04-25

## 2018-04-23 RX ORDER — GLUCAGON INJECTION, SOLUTION 0.5 MG/.1ML
1 INJECTION, SOLUTION SUBCUTANEOUS ONCE
Qty: 0 | Refills: 0 | Status: DISCONTINUED | OUTPATIENT
Start: 2018-04-23 | End: 2018-04-25

## 2018-04-23 RX ADMIN — Medication 50 MILLIGRAM(S): at 18:57

## 2018-04-23 RX ADMIN — ATORVASTATIN CALCIUM 40 MILLIGRAM(S): 80 TABLET, FILM COATED ORAL at 22:22

## 2018-04-23 RX ADMIN — RANOLAZINE 1000 MILLIGRAM(S): 500 TABLET, FILM COATED, EXTENDED RELEASE ORAL at 18:57

## 2018-04-23 RX ADMIN — TICAGRELOR 60 MILLIGRAM(S): 90 TABLET ORAL at 18:57

## 2018-04-23 RX ADMIN — HEPARIN SODIUM 5000 UNIT(S): 5000 INJECTION INTRAVENOUS; SUBCUTANEOUS at 14:43

## 2018-04-23 RX ADMIN — HEPARIN SODIUM 5000 UNIT(S): 5000 INJECTION INTRAVENOUS; SUBCUTANEOUS at 22:23

## 2018-04-23 RX ADMIN — SODIUM CHLORIDE 2000 MILLILITER(S): 9 INJECTION INTRAMUSCULAR; INTRAVENOUS; SUBCUTANEOUS at 04:56

## 2018-04-23 RX ADMIN — ONDANSETRON 4 MILLIGRAM(S): 8 TABLET, FILM COATED ORAL at 04:55

## 2018-04-23 NOTE — H&P ADULT - PROBLEM SELECTOR PLAN 5
c/w lasix, metoprolol and amlodipine with parameters  - Hold off to lisinopril, unsure if patient has RITESH or RITESH on CKD vs CKD.

## 2018-04-23 NOTE — ED ADULT NURSE NOTE - ED STAT RN HANDOFF DETAILS 2
Received patient from [facility]RIST[facility] Valiente , patient admitted to telemetry Slovak speaking in no acute distress denies any pain or discomfort , 2 troponin sent. Family at bedside no bed assigned as yet continue to monitor patient.

## 2018-04-23 NOTE — H&P ADULT - PMH
CAD (coronary artery disease)    DM (diabetes mellitus)    GERD (gastroesophageal reflux disease)    HLD (hyperlipidemia)    HTN (hypertension)

## 2018-04-23 NOTE — ED PROVIDER NOTE - MEDICAL DECISION MAKING DETAILS
B/l symmetric LE weakness, no focal deficits to suggest CVA. No other s/s's of cord compression, and distal LE motor appears intact. No nystagmus or dizziness at this time. Abdomen benign. Hemodynamically stable. Admitted to internal medicine for further monitoring, w/u, and care.

## 2018-04-23 NOTE — H&P ADULT - HISTORY OF PRESENT ILLNESS
83 y/o M pt w/ PMHx of HTN, HLD, DM, CAD, GERD, OA BIBA for suspected L sided weakness. Patient states that he was getting out of bed when suddenly his legs gave out, and he fell. He denies any episodes of near-syncope or syncope. Denies hitting his head. He said he was unable to get up due to b/l leg weakness. His wife called EMS. Patient denies bowel/bladder incontinence. Denies saddle anesthesia. Patient denies lightheadedness or dizziness. He said he had a similar episode last year when he was admitted to [facility] in [facility]. Per patient he had an EEG done there which was normal. Denies slurred speech or any other complaints besides b/l LE weakness at this time.     SH: Denies smoking, alcohol, or illicit drug use. Lives with wife. Ambulates with cane  NKDA

## 2018-04-23 NOTE — H&P ADULT - PROBLEM SELECTOR PLAN 1
c/o LE weakness  CT head- Negative  Passed bedside dysphagia screen  - Admit to tele  - c/w brillinta and statin  - f/u lipid profile  - f/u MR head and MR LS spine (checklist in chart)  - f/u ECHO  - PT evaluation  - Fall precautions  - neurochecks  - Neurology- Dr Solis

## 2018-04-23 NOTE — ED PROVIDER NOTE - PHYSICAL EXAMINATION
Afebrile, hemodynamically stable, saturating well  NAD, initially with clear mucus emesis  Head NCAT  EOMI  MM dry  RRR, nml S1/S2, no m/r/g  Lungs CTAB, no w/r/r  Abd soft, NT, ND, nml BS, no rebound or guarding  AAO, CN's 3-12 intact, motor 5/5 in upper extremities, b/l LE's intact plantarflexion but does not participate further, F-N intact, no horiz or vertical nystagmus, no dysarthria  Skin warm, dry, no rashes or hives

## 2018-04-23 NOTE — H&P ADULT - ASSESSMENT
83 y/o M pt w/ PMHx of HTN, HLD, DM, CAD, GERD, OA BIBA for suspected L sided weakness. Patient is admitted for CVA rule out.

## 2018-04-23 NOTE — H&P ADULT - NSHPREVIEWOFSYSTEMS_GEN_ALL_CORE
REVIEW OF SYSTEMS:  CONSTITUTIONAL: No fever, weight loss, or fatigue  EYES: No eye pain, visual disturbances, or discharge  ENMT:  No difficulty hearing, tinnitus, vertigo; No sinus or throat pain  RESPIRATORY: No cough, wheezing, chills or hemoptysis; No shortness of breath  CARDIOVASCULAR: No chest pain, palpitations, dizziness, or leg swelling  GASTROINTESTINAL: No abdominal or epigastric pain. No nausea, vomiting, or hematemesis; No diarrhea or constipation. No melena or hematochezia.  GENITOURINARY: No dysuria, frequency, hematuria, or incontinence  NEUROLOGICAL: b/l LE weakness  ENDOCRINE: No heat or cold intolerance; No hair loss  MUSCULOSKELETAL: No joint pain or swelling; No muscle, back, or extremity pain  PSYCHIATRIC: No depression, anxiety, mood swings, or difficulty sleeping  HEME/LYMPH: No easy bruising, or bleeding gums  ALLERY AND IMMUNOLOGIC: No hives or eczema

## 2018-04-23 NOTE — ED PROVIDER NOTE - CARE PLAN
Principal Discharge DX:	Gait difficulty  Secondary Diagnosis:	Bilateral lower extremity pain  Secondary Diagnosis:	RITESH (acute kidney injury)

## 2018-04-23 NOTE — ED PROVIDER NOTE - OBJECTIVE STATEMENT
Pt is Slovenian-speaking. 83 y/o M pt w/ PMHx of HTN, HLD, DM, CAD, GERD, OA BIBA for suspected L sided weakness. EMS reports pt's legs gave out while he was walking to the bathroom and wife immediately called EMS at around 0304. EMS states that they found him on the floor with him saying "stroke stroke stroke" and was c/o L sided weakness. Pt was vomiting on ambulance (mostly phlegm) and was found to have a blood pressure of 150/90 and glucose of 96 by EMS. [employer] [place]  ID#[id number] utilized. Pt reports sudden onset of leg weakness due chronic arthritis. States that his body won't function and he is not having any pain right now, but can not move his legs and has generalized weakness Pt denies any abd pain and states he has had this before. Relates he has dizziness sometimes, but denies any right now.  states she does not notice any slurring in his speech. Pt states he has difficulty urinating that's chronic. Feels weak and shaky, but denies any other complaints. NKDA.   Home meds: Clobetasol propionate ointment, Ammonium lactate [id number]%, Metoprolol Tartrate 50mg, Ranexa 1000mg, Tradjenta 5mg, Pantoprazole 40mg, B-complex with b-12, Lisinopril 2.5 mg, mometasone furoate 0.1%, Glimepiride 1mg, Diphenhydramine 50mg, Amlodipine 5mg, Rosuvastatin 10mg, Folic acid 1mg, Furosemide 40mg, Loperamide hcl 2mg, Tamsulosin 0.4mg, Vrilinta 60mg, Vascepa 1g. Pt is [place]-speaking, [facility]  ID#[id number].  81 y/o M pt w/ PMHx of HTN, HLD, DM, CAD, GERD, OA BIBA for suspected L sided weakness. EMS reports pt's legs gave out while he was walking to the bathroom and wife immediately called EMS at around 0304. EMS states that they found him on the floor with him saying "stroke stroke stroke" and was c/o L sided weakness. Pt was vomiting on ambulance (mostly phlegm) and was found to have a blood pressure of 150/90 and glucose of 96 by EMS. Pt himself reports b/l leg weakness due to chronic arthritis. States that his body will function and he is not having any pain right now, but can not move his legs and has generalized weakness.  states she does not notice any slurring in his speech. Pt states he has difficulty urinating that's chronic. Feels weak and shaky, but denies any other complaints, denies CP, abd pain, dizziness, back pain, urinary changes, and any other symptoms. NKDA.   Home meds: Clobetasol propionate ointment, Ammonium lactate [id number]%, Metoprolol Tartrate 50mg, Ranexa 1000mg, Tradjenta 5mg, Pantoprazole 40mg, B-complex with b-12, Lisinopril 2.5 mg, mometasone furoate 0.1%, Glimepiride 1mg, Diphenhydramine 50mg, Amlodipine 5mg, Rosuvastatin 10mg, Folic acid 1mg, Furosemide 40mg, Loperamide hcl 2mg, Tamsulosin 0.4mg, Vrilinta 60mg, Vascepa 1g.

## 2018-04-23 NOTE — ED ADULT NURSE NOTE - CHIEF COMPLAINT QUOTE
BIBA c/o left sided weakness,fell as per wife at home also vomitted as per ems,DR. blankenship at bedside,onset unknown,patient speaks Serbian only. done

## 2018-04-23 NOTE — H&P ADULT - PROBLEM SELECTOR PLAN 4
Cr- 1.93, baseline unknown  Patient states having hx of CKD  - f/u urine lytes  - avoid nephrotoxic medications

## 2018-04-23 NOTE — ED ADULT TRIAGE NOTE - CHIEF COMPLAINT QUOTE
BIBA c/o left sided weakness,fell as per wife at home also vomitted as per ems,DR. blankenship at bedside,onset unknown,patient speaks Mohawk only. done

## 2018-04-23 NOTE — H&P ADULT - NSHPLABSRESULTS_GEN_ALL_CORE
12.0   11.8  )-----------( 200      ( 23 Apr 2018 04:55 )             36.4     04-23    143  |  106  |  50<H>  ----------------------------<  96  3.9   |  27  |  1.93<H>    Ca    7.9<L>      23 Apr 2018 04:55    TPro  6.6  /  Alb  3.2<L>  /  TBili  0.5  /  DBili  x   /  AST  18  /  ALT  24  /  AlkPhos  68  04-23    < from: CT Head No Cont (04.23.18 @ 04:18) >      EXAM:  CT BRAIN                            PROCEDURE DATE:  04/23/2018          INTERPRETATION:  CLINICAL HISTORY:  Weakness.    TECHNIQUE:  CT of the head without contrast.  Contiguous transaxial images of the head were acquired from the skull   base to the vertex without the administration of iodinated contrast.   Coronal and sagittal reformatted images are provided.     COMPARISON:  None available.    FINDINGS:    There is no acute intracranial hemorrhage, mass effect from vasogenic   edema or evidence of acute territorial infarct. There are mild chronic   microvessel changes as manifested by few patchy areas of low-attenuation   in bihemispheric white matter.    The ventricles, sulci and cisternal spaces are mildly diffusely prominent   although in proportion compatible with cerebral volume loss,   age-appropriate..  There is no midline shift or abnormal extra-axial   fluid collection.    The calvarium is intact.  There are no osteoblastic or lytic calvarial or   skull base lesions. The paranasal sinuses and mastoid air cells are   clear.    IMPRESSION:  No acute intracranial hemorrhage, mass effect from vasogenic edema or   evidence for acute territorial infarct.      < end of copied text >

## 2018-04-23 NOTE — H&P ADULT - NSHPPHYSICALEXAM_GEN_ALL_CORE
Vital Signs Last 24 Hrs  T(C): 36.2 (23 Apr 2018 10:48), Max: 36.9 (23 Apr 2018 07:27)  T(F): 97.2 (23 Apr 2018 10:48), Max: 98.4 (23 Apr 2018 07:27)  HR: 75 (23 Apr 2018 10:48) (57 - 75)  BP: 127/45 (23 Apr 2018 10:48) (115/54 - 134/75)  BP(mean): --  RR: 18 (23 Apr 2018 10:48) (18 - 18)  SpO2: 95% (23 Apr 2018 10:48) (95% - 99%)    GENERAL: NAD  HEAD:  Atraumatic, Normocephalic  EYES: EOMI, PERRLA, conjunctiva and sclera clear  ENMT:  Moist mucous membranes  NECK: Supple  NERVOUS SYSTEM:  Alert & Oriented X3, Strength 5/5 on all extremities, unable to asses strength on LE as the patient is not participating, follows commands  CHEST/LUNG: Clear to auscultation bilaterally; No rales, rhonchi, wheezing, or rubs  HEART: Regular rate and rhythm; No murmurs, rubs, or gallops  ABDOMEN: Soft, Nontender, Nondistended; Bowel sounds present  EXTREMITIES:  2+ Peripheral Pulses, No clubbing, cyanosis, or edema

## 2018-04-23 NOTE — H&P ADULT - ATTENDING COMMENTS
81 y/o M pt w/ PMHx of HTN, HLD, DM, CAD, GERD, OA BIBA for suspected L sided weakness. Patient states that he was getting out of bed when suddenly his legs gave out, and he fell. He denies any episodes of near-syncope or syncope. Denies hitting his head. He said he was unable to get up due to b/l leg weakness. His wife called EMS. Patient denies bowel/bladder incontinence. Denies saddle anesthesia. Patient denies lightheadedness or dizziness. He said he had a similar episode last year when he was admitted to [facility] in [facility]. Per patient he had an EEG done there which was normal. Denies slurred speech or any other complaints besides b/l LE weakness at this time.     SH: Denies smoking, alcohol, or illicit drug use. Lives with wife. Ambulates with cane  NKDA    pt seen in bed, a+o x3, nad, vitals stable except elevated bp, physical exam reveals no focal motor deficit, clear lungs, regular s1s2, abd soft nd, nt, bs+. labs and diagnostic test result reviewed.    assessment   --- left side weakness, s/p fall, r/o cns patho, h/o HTN, HLD, DM, CAD, GERD, OA    plan  --  adm to tele, aspirin, statin, cont preadmit home meds, gi and dvt profilaxis  cbc, bmp, mg, phos, lipid, tsh, ce q8 x3    mri brain  mri ls spine  eeg    cardio cons  neuro cons.

## 2018-04-23 NOTE — ED ADULT NURSE REASSESSMENT NOTE - NS ED NURSE REASSESS COMMENT FT1
Patient was endorsed by KRISTI Friend - Patient is in stable condition, speaking in full sentences and breathing comfortably in room air. Patient seen able to ambulate with steady gait . Verbalizes no medical complaints. On Box I - hr 71 bpm.

## 2018-04-24 DIAGNOSIS — M48.061 SPINAL STENOSIS, LUMBAR REGION WITHOUT NEUROGENIC CLAUDICATION: ICD-10-CM

## 2018-04-24 DIAGNOSIS — R26.9 UNSPECIFIED ABNORMALITIES OF GAIT AND MOBILITY: ICD-10-CM

## 2018-04-24 PROBLEM — N18.9 CHRONIC KIDNEY DISEASE, UNSPECIFIED: Chronic | Status: ACTIVE | Noted: 2017-07-12

## 2018-04-24 PROBLEM — E11.9 TYPE 2 DIABETES MELLITUS WITHOUT COMPLICATIONS: Chronic | Status: ACTIVE | Noted: 2017-07-12

## 2018-04-24 PROBLEM — I50.9 HEART FAILURE, UNSPECIFIED: Chronic | Status: ACTIVE | Noted: 2017-07-12

## 2018-04-24 LAB
ALBUMIN SERPL ELPH-MCNC: 3.3 G/DL — LOW (ref 3.5–5)
ALP SERPL-CCNC: 71 U/L — SIGNIFICANT CHANGE UP (ref 40–120)
ALT FLD-CCNC: 21 U/L DA — SIGNIFICANT CHANGE UP (ref 10–60)
ANION GAP SERPL CALC-SCNC: 6 MMOL/L — SIGNIFICANT CHANGE UP (ref 5–17)
AST SERPL-CCNC: 19 U/L — SIGNIFICANT CHANGE UP (ref 10–40)
BASOPHILS # BLD AUTO: 0.1 K/UL — SIGNIFICANT CHANGE UP (ref 0–0.2)
BASOPHILS NFR BLD AUTO: 1.7 % — SIGNIFICANT CHANGE UP (ref 0–2)
BILIRUB SERPL-MCNC: 0.5 MG/DL — SIGNIFICANT CHANGE UP (ref 0.2–1.2)
BUN SERPL-MCNC: 38 MG/DL — HIGH (ref 7–18)
CALCIUM SERPL-MCNC: 8.3 MG/DL — LOW (ref 8.4–10.5)
CHLORIDE SERPL-SCNC: 109 MMOL/L — HIGH (ref 96–108)
CHOLEST SERPL-MCNC: 124 MG/DL — SIGNIFICANT CHANGE UP (ref 10–199)
CO2 SERPL-SCNC: 26 MMOL/L — SIGNIFICANT CHANGE UP (ref 22–31)
CREAT ?TM UR-MCNC: <13 MG/DL — SIGNIFICANT CHANGE UP
CREAT SERPL-MCNC: 1.77 MG/DL — HIGH (ref 0.5–1.3)
EOSINOPHIL # BLD AUTO: 0.4 K/UL — SIGNIFICANT CHANGE UP (ref 0–0.5)
EOSINOPHIL NFR BLD AUTO: 6.4 % — HIGH (ref 0–6)
GLUCOSE BLDC GLUCOMTR-MCNC: 123 MG/DL — HIGH (ref 70–99)
GLUCOSE BLDC GLUCOMTR-MCNC: 126 MG/DL — HIGH (ref 70–99)
GLUCOSE BLDC GLUCOMTR-MCNC: 127 MG/DL — HIGH (ref 70–99)
GLUCOSE BLDC GLUCOMTR-MCNC: 149 MG/DL — HIGH (ref 70–99)
GLUCOSE SERPL-MCNC: 96 MG/DL — SIGNIFICANT CHANGE UP (ref 70–99)
HBA1C BLD-MCNC: 6.3 % — HIGH (ref 4–5.6)
HCT VFR BLD CALC: 37.8 % — LOW (ref 39–50)
HDLC SERPL-MCNC: 40 MG/DL — SIGNIFICANT CHANGE UP (ref 40–125)
HGB BLD-MCNC: 12.3 G/DL — LOW (ref 13–17)
LIPID PNL WITH DIRECT LDL SERPL: 20 MG/DL — SIGNIFICANT CHANGE UP
LYMPHOCYTES # BLD AUTO: 1.1 K/UL — SIGNIFICANT CHANGE UP (ref 1–3.3)
LYMPHOCYTES # BLD AUTO: 15.3 % — SIGNIFICANT CHANGE UP (ref 13–44)
MAGNESIUM SERPL-MCNC: 2.3 MG/DL — SIGNIFICANT CHANGE UP (ref 1.6–2.6)
MCHC RBC-ENTMCNC: 32.6 GM/DL — SIGNIFICANT CHANGE UP (ref 32–36)
MCHC RBC-ENTMCNC: 33.7 PG — SIGNIFICANT CHANGE UP (ref 27–34)
MCV RBC AUTO: 103.2 FL — HIGH (ref 80–100)
MONOCYTES # BLD AUTO: 1.1 K/UL — HIGH (ref 0–0.9)
MONOCYTES NFR BLD AUTO: 15.2 % — HIGH (ref 2–14)
NEUTROPHILS # BLD AUTO: 4.3 K/UL — SIGNIFICANT CHANGE UP (ref 1.8–7.4)
NEUTROPHILS NFR BLD AUTO: 61.4 % — SIGNIFICANT CHANGE UP (ref 43–77)
PHOSPHATE SERPL-MCNC: 3.6 MG/DL — SIGNIFICANT CHANGE UP (ref 2.5–4.5)
PLATELET # BLD AUTO: 203 K/UL — SIGNIFICANT CHANGE UP (ref 150–400)
POTASSIUM SERPL-MCNC: 4.2 MMOL/L — SIGNIFICANT CHANGE UP (ref 3.5–5.3)
POTASSIUM SERPL-SCNC: 4.2 MMOL/L — SIGNIFICANT CHANGE UP (ref 3.5–5.3)
PROT SERPL-MCNC: 6.9 G/DL — SIGNIFICANT CHANGE UP (ref 6–8.3)
RBC # BLD: 3.66 M/UL — LOW (ref 4.2–5.8)
RBC # FLD: 12.3 % — SIGNIFICANT CHANGE UP (ref 10.3–14.5)
SODIUM SERPL-SCNC: 141 MMOL/L — SIGNIFICANT CHANGE UP (ref 135–145)
SODIUM UR-SCNC: 118 MMOL/L — SIGNIFICANT CHANGE UP (ref 40–220)
TOTAL CHOLESTEROL/HDL RATIO MEASUREMENT: 3.1 RATIO — LOW (ref 3.4–9.6)
TRIGL SERPL-MCNC: 319 MG/DL — HIGH (ref 10–149)
WBC # BLD: 7 K/UL — SIGNIFICANT CHANGE UP (ref 3.8–10.5)
WBC # FLD AUTO: 7 K/UL — SIGNIFICANT CHANGE UP (ref 3.8–10.5)

## 2018-04-24 PROCEDURE — 72148 MRI LUMBAR SPINE W/O DYE: CPT | Mod: 26

## 2018-04-24 PROCEDURE — 70551 MRI BRAIN STEM W/O DYE: CPT | Mod: 26

## 2018-04-24 PROCEDURE — 99222 1ST HOSP IP/OBS MODERATE 55: CPT

## 2018-04-24 RX ADMIN — HEPARIN SODIUM 5000 UNIT(S): 5000 INJECTION INTRAVENOUS; SUBCUTANEOUS at 05:27

## 2018-04-24 RX ADMIN — ATORVASTATIN CALCIUM 40 MILLIGRAM(S): 80 TABLET, FILM COATED ORAL at 22:03

## 2018-04-24 RX ADMIN — PANTOPRAZOLE SODIUM 40 MILLIGRAM(S): 20 TABLET, DELAYED RELEASE ORAL at 08:18

## 2018-04-24 RX ADMIN — PREGABALIN 1000 MICROGRAM(S): 225 CAPSULE ORAL at 12:46

## 2018-04-24 RX ADMIN — Medication 1 MILLIGRAM(S): at 12:46

## 2018-04-24 RX ADMIN — TICAGRELOR 60 MILLIGRAM(S): 90 TABLET ORAL at 05:31

## 2018-04-24 RX ADMIN — Medication 1 APPLICATION(S): at 18:02

## 2018-04-24 RX ADMIN — RANOLAZINE 1000 MILLIGRAM(S): 500 TABLET, FILM COATED, EXTENDED RELEASE ORAL at 05:23

## 2018-04-24 RX ADMIN — TICAGRELOR 60 MILLIGRAM(S): 90 TABLET ORAL at 18:01

## 2018-04-24 RX ADMIN — TAMSULOSIN HYDROCHLORIDE 0.4 MILLIGRAM(S): 0.4 CAPSULE ORAL at 12:46

## 2018-04-24 RX ADMIN — Medication 40 MILLIGRAM(S): at 05:24

## 2018-04-24 RX ADMIN — HEPARIN SODIUM 5000 UNIT(S): 5000 INJECTION INTRAVENOUS; SUBCUTANEOUS at 22:03

## 2018-04-24 RX ADMIN — RANOLAZINE 1000 MILLIGRAM(S): 500 TABLET, FILM COATED, EXTENDED RELEASE ORAL at 18:01

## 2018-04-24 RX ADMIN — AMLODIPINE BESYLATE 5 MILLIGRAM(S): 2.5 TABLET ORAL at 05:24

## 2018-04-24 RX ADMIN — HEPARIN SODIUM 5000 UNIT(S): 5000 INJECTION INTRAVENOUS; SUBCUTANEOUS at 18:02

## 2018-04-24 RX ADMIN — Medication 50 MILLIGRAM(S): at 18:01

## 2018-04-24 NOTE — CONSULT NOTE ADULT - SUBJECTIVE AND OBJECTIVE BOX
History of Present Illness:    HPI:  81 y/o M pt w/ PMHx of HTN, HLD, DM, CAD, GERD, OA BIBA for suspected L sided weakness. Patient states that he was getting out of bed when suddenly his legs gave out, and he fell. He denies any episodes of near-syncope or syncope. Denies hitting his head. He said he was unable to get up due to b/l leg weakness. His wife called EMS. Patient denies bowel/bladder incontinence. Denies saddle anesthesia. Patient denies lightheadedness or dizziness. He said he had a similar episode last year when he was admitted to [facility] in [facility]. Per patient he had an EEG done there which was normal. Denies slurred speech or any other complaints besides b/l LE weakness at this time.     SH: Denies smoking, alcohol, or illicit drug use. Lives with wife. Ambulates with cane  NKDA (23 Apr 2018 12:05)    Later in ED: Pt is Wolof speaking and we go tele- for translation.  He presented with symptoms of both legs weakness when got woke yesterday from the bed. He tried to walk and his legs got buckled up and he fell. He denied any lower back pain now but has had similar episode in the past lower back pain. He had brain scan and EEG at other hospital and they were normal. He said his symptoms are similar. He denied any upper body symptom or any slurred speech. No headaches, blurred vision, double vision or loss of vision. His CT-scan of the brain is unremarkable.    Allergies    IV Contrast (Hives)    Intolerances    PAST MEDICAL & SURGICAL HISTORY:  GERD (gastroesophageal reflux disease)  CAD (coronary artery disease)  DM (diabetes mellitus)  HLD (hyperlipidemia)  HTN (hypertension)  CKD (chronic kidney disease)  Diabetes mellitus  HTN (hypertension)  CHF (congestive heart failure)  No significant past surgical history  No significant past surgical history    Social History: [ ] Tobacco use, [ ] Alcohol use.  None    MEDICATIONS  (STANDING):  amLODIPine   Tablet 5 milliGRAM(s) Oral daily  atorvastatin 40 milliGRAM(s) Oral at bedtime  cyanocobalamin 1000 MICROGram(s) Oral daily  dextrose 5%. 1000 milliLiter(s) (50 mL/Hr) IV Continuous <Continuous>  dextrose 50% Injectable 12.5 Gram(s) IV Push once  dextrose 50% Injectable 25 Gram(s) IV Push once  dextrose 50% Injectable 25 Gram(s) IV Push once  fluocinonide 0.05% Ointment 1 Application(s) Topical daily  folic acid 1 milliGRAM(s) Oral daily  furosemide    Tablet 40 milliGRAM(s) Oral daily  heparin  Injectable 5000 Unit(s) SubCutaneous every 8 hours  insulin lispro (HumaLOG) corrective regimen sliding scale   SubCutaneous three times a day before meals  metoprolol tartrate 50 milliGRAM(s) Oral two times a day  pantoprazole    Tablet 40 milliGRAM(s) Oral before breakfast  ranolazine 1000 milliGRAM(s) Oral two times a day  tamsulosin 0.4 milliGRAM(s) Oral daily  ticagrelor 60 milliGRAM(s) Oral two times a day    Family:  FAMILY HISTORY:  No pertinent family history in first degree relatives    Review of Systems:  General: [ ] None, [ ] chills,[ ] fatigue,[ ] fevers  Skin: [ ] None, [ ] rash present  HEENT: [ ] None, [ ] head injury,[ ] blurred vision, [ ] double vision, [ ] eye pain, [ ] visual loss, [ ] hearing loss, [ ] deafness, [ ] ear pain, [ ] ringing in the ears, [ ] vertigo, [ ] sinus pain, [ ] voice changes  Neck: [ ] None, [ ] Neck stiffness  Respiratory: [ ]  None, [ ] cough, [ ] difficulty breathing  Cardiovascular: [ ] None,  [ ] calf cramps, [ ] chest pain, [ ] leg pain, [ ] swelling, [ ] rapid heart rate, [ ] shortness of breath  Gastrointestinal: [ ] None, [ ] abdominal pain, [ ] nausea, [ ] vomiting  Musculoskeletal: [ ] None, [ ] back pain, [ ] joint pain, [ ] joint stiffness, [ ] leg cramps, [ ] muscle atrophy, [ ] muscle cramps, [ ] muscle weakness, [ ] swelling of extremities  Neurological: [ ] None,  [ ] Dizziness, [ ] decreased memory, [ ] fainting, [ ] focal neurological symptoms, [ ] headaches, [ ] incontinence of stool, [ ] incontinence of urine, [ ] loss of consciousness, [ ] numbness, [ ] seizures, [ ] spinning sensation, [ ] stroke, [x ] trouble walking, [x ] unsteadiness, [ ] visual changes,  [ ] weakness- both legs, [ ] tremors, [ ] rigidity, [ ] slowness  Psychiatric: [ ] None,  [ ] depression, [ ] anxiety, [ ] hallucinations, [ ] inability to concentrate,[ ] mood changes, [ ] panic attacks  Hematology: [ ] None, [ ] blood clots, [ ] spontaneous bleeding    [ x]  None except marked above      Vital Signs:    T(C): 36.2 (04-24-18 @ 07:27), Max: 36.9 (04-23-18 @ 07:27)  HR: 66 (04-24-18 @ 10:48) (57 - 75)  BP: 137/52 (04-24-18 @ 10:48) (115/54 - 142/50)  RR: 17 (04-24-18 @ 07:27) (16 - 18)  SpO2: 98% (04-24-18 @ 07:27) (95% - 100%)    Physical Exam:  General:  General Appearance - Well groomed, Not sickly   Build and nutrition - Well nourished and Well developed  Posture - Normal posture    Head and Neck:  Head - normocephalic, atraumatic with no lesions or palpable masses    Chest and Lung exam:  Quiet, even and easy respiratory effort with no use of accessory muscles and on ausculation, normal breath sounds, no adventitious sounds and normal vocal resonance    Cardiovascular:  Auscultation: Normal heart sounds  Murmurs & Other heart sounds: Auscultation of the heart reveals- no murmurs  Carotid arteris: No Carotid bruits    Abdomen:  Palpation/Percussion: Non Tender, No Rebound tenderness, No hepatosplenomegaly, and No palpable abdominal masses    Peripheral Vascular:  Lower extremity: Inspection - Bilateral - No varicose veins  Palpation: Tenderness - bilateral - Non tender  Dorsalis pedis pulse - bilateral - normal  Edema - bilateral - no edema    Neurologic Exam:  Mental Status -  Alert, Awake  Fund of Knowledge – Normal  Affect- appropriate  Recent Memory – Normal  Remote Memory – Normal  Attention Span – Normal  Concentration –  Normal  Cognitive function – Normal  Speech – Normal  Thought content/perception – Normal    Cranial Nerves:  II Optic: Visual acuity – Bilateral - Normal ; Visual fields – normal ; Fundi – Bilateral – no optic atrophy or Papilledema  III Oculomotor – Normal bilaterally  IV Trochlear – Bilateral – Normal  V Trigeminal: Ophthalmic – Bilateral – Normal;  Maxillary – Bilateral- Normal; Mandibular – Bilateral - Normal  VI Abducens – Bilateral - Normal  VII Facial: Normal bilaterally  VIII Acoustic - Bilateral – hearing normal and (hearing tested by finger rub)  IX Glossopharyngeal / X Vagus: Uvula – Normal  XI Accessory: Normal Shoulder Shrug  XII Hypoglossal – Bilaterally Normal  Eye Movements: Gaze – Bilateral - Normal    Nystagmus – Bilateral – None  Motor:  Bulk and Contour: Normal  Tone: Normal  Strength:                                                             Delt              Bicep           Tricep                                 Upper Extremities:          Right              5/5 5/5 5/5 5/5                                                          Left                5/5 5/5 5/5 5/5                                                                                  HF                 KE                KF                   DF                     Lower Extremities:          Right             5/5 5/5 5/5 5/5                                                          Left               5/5 5/5 5/5 5/5  General Assessment of Reflexes: Right Wrist - 1+ ;  Left Wrist - 1+ ; Right Elbow - 1+ ;  Left Elbow - 1+ ;  Right Knee - 1+ ;  Left Knee - 1+ ;   Right Ankle – 0 ; Left Ankle – 0  Plantar Reflexes(Babinski) (L4-S2) – Bilateral – Flexion  Sensory:  Light Touch: Intact – Globally  Pain: Intact – Globally  Temperature: Intact – Globally  Coordination – No Impairment of heel-to-shin, Impairment of finger-to-nose or Impairment of rapid alternating movement

## 2018-04-24 NOTE — CONSULT NOTE ADULT - ASSESSMENT
1) Gait disorder- with c/o kamala LE weakness.--? Lumbar spinal stenosis. Doubt brain problem. Needs MRI-L-S spine

## 2018-04-24 NOTE — PROGRESS NOTE ADULT - SUBJECTIVE AND OBJECTIVE BOX
Patient is a 82y old  Male who presents with a chief complaint of Weakness and fall (23 Apr 2018 12:05)    pt seen in icu [  ], reg med floor [   ], bed [  ], chair at bedside [   ], a+o x3 [  ], lethargic [  ],  nad [  ]    thompson [  ], ngt [  ], peg [  ], et tube [  ], cent line [  ], picc line [  ]        Allergies    Allergy Status Unknown        Vitals    T(F): 97.2 (04-24-18 @ 07:27), Max: 97.8 (04-23-18 @ 18:44)  HR: 62 (04-24-18 @ 07:27) (59 - 75)  BP: 142/50 (04-24-18 @ 07:27) (124/59 - 142/50)  RR: 17 (04-24-18 @ 07:27) (16 - 18)  SpO2: 98% (04-24-18 @ 07:27) (95% - 100%)  Wt(kg): --  CAPILLARY BLOOD GLUCOSE      POCT Blood Glucose.: 126 mg/dL (24 Apr 2018 07:52)      Labs                          12.0   11.8  )-----------( 200      ( 23 Apr 2018 04:55 )             36.4       04-23    143  |  106  |  50<H>  ----------------------------<  96  3.9   |  27  |  1.93<H>    Ca    7.9<L>      23 Apr 2018 04:55    TPro  6.6  /  Alb  3.2<L>  /  TBili  0.5  /  DBili  x   /  AST  18  /  ALT  24  /  AlkPhos  68  04-23      CARDIAC MARKERS ( 23 Apr 2018 16:54 )  0.017 ng/mL / x     / 118 U/L / x     / 3.1 ng/mL  CARDIAC MARKERS ( 23 Apr 2018 04:55 )  0.020 ng/mL / x     / x     / x     / x                Radiology Results      Meds    MEDICATIONS  (STANDING):  amLODIPine   Tablet 5 milliGRAM(s) Oral daily  atorvastatin 40 milliGRAM(s) Oral at bedtime  cyanocobalamin 1000 MICROGram(s) Oral daily  dextrose 5%. 1000 milliLiter(s) (50 mL/Hr) IV Continuous <Continuous>  dextrose 50% Injectable 12.5 Gram(s) IV Push once  dextrose 50% Injectable 25 Gram(s) IV Push once  dextrose 50% Injectable 25 Gram(s) IV Push once  fluocinonide 0.05% Ointment 1 Application(s) Topical daily  folic acid 1 milliGRAM(s) Oral daily  furosemide    Tablet 40 milliGRAM(s) Oral daily  heparin  Injectable 5000 Unit(s) SubCutaneous every 8 hours  insulin lispro (HumaLOG) corrective regimen sliding scale   SubCutaneous three times a day before meals  metoprolol tartrate 50 milliGRAM(s) Oral two times a day  pantoprazole    Tablet 40 milliGRAM(s) Oral before breakfast  ranolazine 1000 milliGRAM(s) Oral two times a day  tamsulosin 0.4 milliGRAM(s) Oral daily  ticagrelor 60 milliGRAM(s) Oral two times a day      MEDICATIONS  (PRN):  dextrose Gel 1 Dose(s) Oral once PRN Blood Glucose LESS THAN 70 milliGRAM(s)/deciliter  glucagon  Injectable 1 milliGRAM(s) IntraMuscular once PRN Glucose LESS THAN 70 milligrams/deciliter      Physical Exam    Neuro :  no focal deficits  Respiratory: CTA B/L  CV: RRR, S1S2, no murmurs,   Abdominal: Soft, NT, ND +BS,  Extremities: No edema, + peripheral pulses    ASSESSMENT    Acute renal failure  GERD (gastroesophageal reflux disease)  CAD (coronary artery disease)  DM (diabetes mellitus)  HLD (hyperlipidemia)  HTN (hypertension)  No significant past surgical history      PLAN Patient is a 82y old  Male who presents with a chief complaint of Weakness and fall (23 Apr 2018 12:05)    pt seen in ed tele [x  ], reg med floor [   ], bed [ x ], chair at bedside [   ], a+o x3 [x  ], lethargic [  ],  nad [x  ]        Allergies    Allergy Status Unknown        Vitals    T(F): 97.2 (04-24-18 @ 07:27), Max: 97.8 (04-23-18 @ 18:44)  HR: 62 (04-24-18 @ 07:27) (59 - 75)  BP: 142/50 (04-24-18 @ 07:27) (124/59 - 142/50)  RR: 17 (04-24-18 @ 07:27) (16 - 18)  SpO2: 98% (04-24-18 @ 07:27) (95% - 100%)  Wt(kg): --  CAPILLARY BLOOD GLUCOSE      POCT Blood Glucose.: 126 mg/dL (24 Apr 2018 07:52)      Labs                          12.0   11.8  )-----------( 200      ( 23 Apr 2018 04:55 )             36.4       04-23    143  |  106  |  50<H>  ----------------------------<  96  3.9   |  27  |  1.93<H>    Ca    7.9<L>      23 Apr 2018 04:55    TPro  6.6  /  Alb  3.2<L>  /  TBili  0.5  /  DBili  x   /  AST  18  /  ALT  24  /  AlkPhos  68  04-23      CARDIAC MARKERS ( 23 Apr 2018 16:54 )  0.017 ng/mL / x     / 118 U/L / x     / 3.1 ng/mL  CARDIAC MARKERS ( 23 Apr 2018 04:55 )  0.020 ng/mL / x     / x     / x     / x                Radiology Results          Meds    MEDICATIONS  (STANDING):  amLODIPine   Tablet 5 milliGRAM(s) Oral daily  atorvastatin 40 milliGRAM(s) Oral at bedtime  cyanocobalamin 1000 MICROGram(s) Oral daily  dextrose 5%. 1000 milliLiter(s) (50 mL/Hr) IV Continuous <Continuous>  dextrose 50% Injectable 12.5 Gram(s) IV Push once  dextrose 50% Injectable 25 Gram(s) IV Push once  dextrose 50% Injectable 25 Gram(s) IV Push once  fluocinonide 0.05% Ointment 1 Application(s) Topical daily  folic acid 1 milliGRAM(s) Oral daily  furosemide    Tablet 40 milliGRAM(s) Oral daily  heparin  Injectable 5000 Unit(s) SubCutaneous every 8 hours  insulin lispro (HumaLOG) corrective regimen sliding scale   SubCutaneous three times a day before meals  metoprolol tartrate 50 milliGRAM(s) Oral two times a day  pantoprazole    Tablet 40 milliGRAM(s) Oral before breakfast  ranolazine 1000 milliGRAM(s) Oral two times a day  tamsulosin 0.4 milliGRAM(s) Oral daily  ticagrelor 60 milliGRAM(s) Oral two times a day      MEDICATIONS  (PRN):  dextrose Gel 1 Dose(s) Oral once PRN Blood Glucose LESS THAN 70 milliGRAM(s)/deciliter  glucagon  Injectable 1 milliGRAM(s) IntraMuscular once PRN Glucose LESS THAN 70 milligrams/deciliter      Physical Exam    Neuro :  no focal deficits  Respiratory: CTA B/L  CV: RRR, S1S2, no murmurs,   Abdominal: Soft, NT, ND +BS,  Extremities: No edema, + peripheral pulses    ASSESSMENT      left side weakness,   s/p fall,   r/o cns patho,   Acute renal failure  h/o GERD (gastroesophageal reflux disease)  CAD (coronary artery disease)  DM (diabetes mellitus)  HLD (hyperlipidemia)  HTN (hypertension)  No significant past surgical history      PLAN    cont tele  cont aspirin, statin,   ce q8 x 2 noted above  f/u mri brain  f/u mri ls spine  eeg  cardio cons  neuro cons  cont current meds.

## 2018-04-24 NOTE — PATIENT PROFILE ADULT. - NS TRANSFER PATIENT BELONGINGS
Message   A1c not at goal, recent office visit and he was encouraged to be compliant with insulin regimen and to send in a glucose log  CMP normal       Verified Results  (1) COMPREHENSIVE METABOLIC PANEL [id number] 61:74OR Jodi Al 39 Kidney Disease Education Program recommendations are as follows:  GFR calculation is accurate only with a steady state creatinine  Chronic Kidney disease less than 60 ml/min/1 73 sq  meters  Kidney failure less than 15 ml/min/1 73 sq  meters  Test Name Result Flag Reference   GLUCOSE,RANDM 146 mg/dL H    SODIUM 138 mmol/L  136-145   POTASSIUM 4 0 mmol/L  3 5-5 3   CHLORIDE 104 mmol/L  100-108   CARBON DIOXIDE 27 mmol/L  21-32   ANION GAP (CALC) 7 mmol/L  4-13   BLOOD UREA NITROGEN 11 mg/dL  5-25   CREATININE 0 93 mg/dL  0 60-1 30   Standardized to IDMS reference method   CALCIUM 8 5 mg/dL  8 3-10 1   BILI, TOTAL 0 46 mg/dL  0 20-1 00   ALK PHOSPHATAS 102 U/L     ALT (SGPT) 29 U/L  12-78   AST(SGOT) 17 U/L  5-45   ALBUMIN 3 7 g/dL  3 5-5 0   TOTAL PROTEIN 8 0 g/dL  6 4-8 2   eGFR Non-African American      >60 0 ml/min/1 73sq m     (1) HEMOGLOBIN A1C [id number] 12:47PM Dalia Al   5 7-6 4% impaired fasting glucose  >=6 5% diagnosis of diabetes    Falsely low levels are seen in conditions linked to short RBC life span-  hemolytic anemia, and splenomegaly  Falsely elevated levels are seen in situations where there is an increased production of RBC- receipt of erythropoietin or blood transfusions  Adopted from ADA-Clinical Practice Recommendations     Test Name Result Flag Reference   HEMOGLOBIN A1C 9 8 % H 4 0-5 6   EST  AVG   GLUCOSE 235 mg/dl         Signatures   Electronically signed by : Hernando Buitrago, ; Mar 11 2016  5:27PM EST                       (Author)
Cell Phone/PDA (specify)/Clothing

## 2018-04-25 ENCOUNTER — TRANSCRIPTION ENCOUNTER (OUTPATIENT)
Age: 83
End: 2018-04-25

## 2018-04-25 VITALS — WEIGHT: 133.38 LBS

## 2018-04-25 LAB
ALBUMIN SERPL ELPH-MCNC: 3.4 G/DL — LOW (ref 3.5–5)
ALP SERPL-CCNC: 70 U/L — SIGNIFICANT CHANGE UP (ref 40–120)
ALT FLD-CCNC: 26 U/L DA — SIGNIFICANT CHANGE UP (ref 10–60)
ANION GAP SERPL CALC-SCNC: 6 MMOL/L — SIGNIFICANT CHANGE UP (ref 5–17)
AST SERPL-CCNC: 19 U/L — SIGNIFICANT CHANGE UP (ref 10–40)
BASOPHILS # BLD AUTO: 0.1 K/UL — SIGNIFICANT CHANGE UP (ref 0–0.2)
BASOPHILS NFR BLD AUTO: 0.9 % — SIGNIFICANT CHANGE UP (ref 0–2)
BILIRUB SERPL-MCNC: 0.5 MG/DL — SIGNIFICANT CHANGE UP (ref 0.2–1.2)
BUN SERPL-MCNC: 38 MG/DL — HIGH (ref 7–18)
CALCIUM SERPL-MCNC: 8.8 MG/DL — SIGNIFICANT CHANGE UP (ref 8.4–10.5)
CHLORIDE SERPL-SCNC: 105 MMOL/L — SIGNIFICANT CHANGE UP (ref 96–108)
CO2 SERPL-SCNC: 28 MMOL/L — SIGNIFICANT CHANGE UP (ref 22–31)
CREAT SERPL-MCNC: 1.71 MG/DL — HIGH (ref 0.5–1.3)
EOSINOPHIL # BLD AUTO: 0.8 K/UL — HIGH (ref 0–0.5)
EOSINOPHIL NFR BLD AUTO: 9.8 % — HIGH (ref 0–6)
FOLATE SERPL-MCNC: >20 NG/ML — SIGNIFICANT CHANGE UP
GLUCOSE BLDC GLUCOMTR-MCNC: 126 MG/DL — HIGH (ref 70–99)
GLUCOSE BLDC GLUCOMTR-MCNC: 132 MG/DL — HIGH (ref 70–99)
GLUCOSE SERPL-MCNC: 116 MG/DL — HIGH (ref 70–99)
HCT VFR BLD CALC: 39.7 % — SIGNIFICANT CHANGE UP (ref 39–50)
HGB BLD-MCNC: 12.5 G/DL — LOW (ref 13–17)
LYMPHOCYTES # BLD AUTO: 1.4 K/UL — SIGNIFICANT CHANGE UP (ref 1–3.3)
LYMPHOCYTES # BLD AUTO: 18.2 % — SIGNIFICANT CHANGE UP (ref 13–44)
MCHC RBC-ENTMCNC: 31.5 GM/DL — LOW (ref 32–36)
MCHC RBC-ENTMCNC: 32.8 PG — SIGNIFICANT CHANGE UP (ref 27–34)
MCV RBC AUTO: 103.9 FL — HIGH (ref 80–100)
MONOCYTES # BLD AUTO: 1.2 K/UL — HIGH (ref 0–0.9)
MONOCYTES NFR BLD AUTO: 14.9 % — HIGH (ref 2–14)
NEUTROPHILS # BLD AUTO: 4.4 K/UL — SIGNIFICANT CHANGE UP (ref 1.8–7.4)
NEUTROPHILS NFR BLD AUTO: 56.2 % — SIGNIFICANT CHANGE UP (ref 43–77)
PLATELET # BLD AUTO: 220 K/UL — SIGNIFICANT CHANGE UP (ref 150–400)
POTASSIUM SERPL-MCNC: 4.2 MMOL/L — SIGNIFICANT CHANGE UP (ref 3.5–5.3)
POTASSIUM SERPL-SCNC: 4.2 MMOL/L — SIGNIFICANT CHANGE UP (ref 3.5–5.3)
PROT SERPL-MCNC: 7.2 G/DL — SIGNIFICANT CHANGE UP (ref 6–8.3)
RBC # BLD: 3.82 M/UL — LOW (ref 4.2–5.8)
RBC # FLD: 12.4 % — SIGNIFICANT CHANGE UP (ref 10.3–14.5)
SODIUM SERPL-SCNC: 139 MMOL/L — SIGNIFICANT CHANGE UP (ref 135–145)
VIT B12 SERPL-MCNC: 814 PG/ML — SIGNIFICANT CHANGE UP (ref 232–1245)
WBC # BLD: 7.9 K/UL — SIGNIFICANT CHANGE UP (ref 3.8–10.5)
WBC # FLD AUTO: 7.9 K/UL — SIGNIFICANT CHANGE UP (ref 3.8–10.5)

## 2018-04-25 PROCEDURE — 84484 ASSAY OF TROPONIN QUANT: CPT

## 2018-04-25 PROCEDURE — 82570 ASSAY OF URINE CREATININE: CPT

## 2018-04-25 PROCEDURE — 93306 TTE W/DOPPLER COMPLETE: CPT

## 2018-04-25 PROCEDURE — 86901 BLOOD TYPING SEROLOGIC RH(D): CPT

## 2018-04-25 PROCEDURE — 70551 MRI BRAIN STEM W/O DYE: CPT

## 2018-04-25 PROCEDURE — 70450 CT HEAD/BRAIN W/O DYE: CPT

## 2018-04-25 PROCEDURE — 83735 ASSAY OF MAGNESIUM: CPT

## 2018-04-25 PROCEDURE — 82962 GLUCOSE BLOOD TEST: CPT

## 2018-04-25 PROCEDURE — 82746 ASSAY OF FOLIC ACID SERUM: CPT

## 2018-04-25 PROCEDURE — 99285 EMERGENCY DEPT VISIT HI MDM: CPT | Mod: 25

## 2018-04-25 PROCEDURE — 84300 ASSAY OF URINE SODIUM: CPT

## 2018-04-25 PROCEDURE — 85027 COMPLETE CBC AUTOMATED: CPT

## 2018-04-25 PROCEDURE — 80053 COMPREHEN METABOLIC PANEL: CPT

## 2018-04-25 PROCEDURE — 84100 ASSAY OF PHOSPHORUS: CPT

## 2018-04-25 PROCEDURE — 82553 CREATINE MB FRACTION: CPT

## 2018-04-25 PROCEDURE — 83036 HEMOGLOBIN GLYCOSYLATED A1C: CPT

## 2018-04-25 PROCEDURE — 82550 ASSAY OF CK (CPK): CPT

## 2018-04-25 PROCEDURE — 85730 THROMBOPLASTIN TIME PARTIAL: CPT

## 2018-04-25 PROCEDURE — 80307 DRUG TEST PRSMV CHEM ANLYZR: CPT

## 2018-04-25 PROCEDURE — 85610 PROTHROMBIN TIME: CPT

## 2018-04-25 PROCEDURE — 71045 X-RAY EXAM CHEST 1 VIEW: CPT

## 2018-04-25 PROCEDURE — 86780 TREPONEMA PALLIDUM: CPT

## 2018-04-25 PROCEDURE — 86850 RBC ANTIBODY SCREEN: CPT

## 2018-04-25 PROCEDURE — 80061 LIPID PANEL: CPT

## 2018-04-25 PROCEDURE — 86900 BLOOD TYPING SEROLOGIC ABO: CPT

## 2018-04-25 PROCEDURE — 82607 VITAMIN B-12: CPT

## 2018-04-25 PROCEDURE — 93005 ELECTROCARDIOGRAM TRACING: CPT

## 2018-04-25 PROCEDURE — 72148 MRI LUMBAR SPINE W/O DYE: CPT

## 2018-04-25 RX ORDER — TAMSULOSIN HYDROCHLORIDE 0.4 MG/1
1 CAPSULE ORAL
Qty: 0 | Refills: 0 | COMMUNITY

## 2018-04-25 RX ORDER — AMLODIPINE BESYLATE 2.5 MG/1
1 TABLET ORAL
Qty: 0 | Refills: 0 | COMMUNITY

## 2018-04-25 RX ORDER — PANTOPRAZOLE SODIUM 20 MG/1
0 TABLET, DELAYED RELEASE ORAL
Qty: 0 | Refills: 0 | COMMUNITY

## 2018-04-25 RX ORDER — RANOLAZINE 500 MG/1
0 TABLET, FILM COATED, EXTENDED RELEASE ORAL
Qty: 0 | Refills: 0 | COMMUNITY

## 2018-04-25 RX ORDER — TICAGRELOR 90 MG/1
1 TABLET ORAL
Qty: 0 | Refills: 0 | COMMUNITY

## 2018-04-25 RX ORDER — FUROSEMIDE 40 MG
1 TABLET ORAL
Qty: 0 | Refills: 0 | COMMUNITY
Start: 2018-04-25

## 2018-04-25 RX ORDER — AMLODIPINE BESYLATE 2.5 MG/1
0 TABLET ORAL
Qty: 0 | Refills: 0 | COMMUNITY

## 2018-04-25 RX ORDER — METOPROLOL TARTRATE 50 MG
0 TABLET ORAL
Qty: 0 | Refills: 0 | COMMUNITY

## 2018-04-25 RX ORDER — ROSUVASTATIN CALCIUM 5 MG/1
0 TABLET ORAL
Qty: 0 | Refills: 0 | COMMUNITY

## 2018-04-25 RX ORDER — MOMETASONE FUROATE 1 MG/G
0 CREAM TOPICAL
Qty: 0 | Refills: 0 | COMMUNITY

## 2018-04-25 RX ORDER — FUROSEMIDE 40 MG
1 TABLET ORAL
Qty: 0 | Refills: 0 | COMMUNITY

## 2018-04-25 RX ORDER — FUROSEMIDE 40 MG
0 TABLET ORAL
Qty: 0 | Refills: 0 | COMMUNITY

## 2018-04-25 RX ORDER — PANTOPRAZOLE SODIUM 20 MG/1
1 TABLET, DELAYED RELEASE ORAL
Qty: 0 | Refills: 0 | COMMUNITY

## 2018-04-25 RX ORDER — FOLIC ACID 0.8 MG
0 TABLET ORAL
Qty: 0 | Refills: 0 | COMMUNITY

## 2018-04-25 RX ORDER — PREGABALIN 225 MG/1
1 CAPSULE ORAL
Qty: 15 | Refills: 0 | OUTPATIENT
Start: 2018-04-25 | End: 2018-05-09

## 2018-04-25 RX ORDER — GLIMEPIRIDE 1 MG
0 TABLET ORAL
Qty: 0 | Refills: 0 | COMMUNITY

## 2018-04-25 RX ADMIN — Medication 1 MILLIGRAM(S): at 12:14

## 2018-04-25 RX ADMIN — HEPARIN SODIUM 5000 UNIT(S): 5000 INJECTION INTRAVENOUS; SUBCUTANEOUS at 05:36

## 2018-04-25 RX ADMIN — PANTOPRAZOLE SODIUM 40 MILLIGRAM(S): 20 TABLET, DELAYED RELEASE ORAL at 05:36

## 2018-04-25 RX ADMIN — PREGABALIN 1000 MICROGRAM(S): 225 CAPSULE ORAL at 12:14

## 2018-04-25 RX ADMIN — Medication 50 MILLIGRAM(S): at 05:36

## 2018-04-25 RX ADMIN — TAMSULOSIN HYDROCHLORIDE 0.4 MILLIGRAM(S): 0.4 CAPSULE ORAL at 12:14

## 2018-04-25 RX ADMIN — TICAGRELOR 60 MILLIGRAM(S): 90 TABLET ORAL at 05:35

## 2018-04-25 RX ADMIN — AMLODIPINE BESYLATE 5 MILLIGRAM(S): 2.5 TABLET ORAL at 05:36

## 2018-04-25 RX ADMIN — Medication 40 MILLIGRAM(S): at 05:36

## 2018-04-25 RX ADMIN — HEPARIN SODIUM 5000 UNIT(S): 5000 INJECTION INTRAVENOUS; SUBCUTANEOUS at 14:01

## 2018-04-25 RX ADMIN — RANOLAZINE 1000 MILLIGRAM(S): 500 TABLET, FILM COATED, EXTENDED RELEASE ORAL at 05:36

## 2018-04-25 NOTE — PROGRESS NOTE ADULT - SUBJECTIVE AND OBJECTIVE BOX
Patient is a 82y old  Male who presents with a chief complaint of Weakness and fall (25 Apr 2018 11:40)      pt seen in ed tele [x  ], reg med floor [   ], bed [  ], chair at bedside [  x ], a+o x3 [x  ], lethargic [  ],  nad [x  ]      Allergies    IV Contrast (Hives)        Vitals    T(F): 97.3 (04-25-18 @ 11:20), Max: 98 (04-24-18 @ 23:28)  HR: 59 (04-25-18 @ 11:20) (59 - 70)  BP: 112/51 (04-25-18 @ 11:20) (111/46 - 128/49)  RR: 18 (04-25-18 @ 11:20) (18 - 18)  SpO2: 96% (04-25-18 @ 11:20) (96% - 100%)  Wt(kg): --  CAPILLARY BLOOD GLUCOSE      POCT Blood Glucose.: 132 mg/dL (25 Apr 2018 11:32)      Labs                          12.5   7.9   )-----------( 220      ( 25 Apr 2018 07:25 )             39.7       04-25    139  |  105  |  38<H>  ----------------------------<  116<H>  4.2   |  28  |  1.71<H>    Ca    8.8      25 Apr 2018 07:25  Phos  3.6     04-24  Mg     2.3     04-24    TPro  7.2  /  Alb  3.4<L>  /  TBili  0.5  /  DBili  x   /  AST  19  /  ALT  26  /  AlkPhos  70  04-25      CARDIAC MARKERS ( 23 Apr 2018 16:54 )  0.017 ng/mL / x     / 118 U/L / x     / 3.1 ng/mL        Radiology Results    < from: MR Head No Cont (04.24.18 @ 16:15) >  There is mild generalized cortical volume loss and chronic microvascular   ischemic change in the periventricular and subcortical white matter.   There is no mass effect, cortical edema or hydrocephalus. There is a   cavum septum. There is no evidence of acute infarct or previous   parenchymal hemorrhage. 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 No acute findings or significant spinal   stenosis      < end of copied text >          Meds    MEDICATIONS  (STANDING):  amLODIPine   Tablet 5 milliGRAM(s) Oral daily  atorvastatin 40 milliGRAM(s) Oral at bedtime  cyanocobalamin 1000 MICROGram(s) Oral daily  dextrose 5%. 1000 milliLiter(s) (50 mL/Hr) IV Continuous <Continuous>  dextrose 50% Injectable 12.5 Gram(s) IV Push once  dextrose 50% Injectable 25 Gram(s) IV Push once  dextrose 50% Injectable 25 Gram(s) IV Push once  fluocinonide 0.05% Ointment 1 Application(s) Topical daily  folic acid 1 milliGRAM(s) Oral daily  furosemide    Tablet 40 milliGRAM(s) Oral daily  heparin  Injectable 5000 Unit(s) SubCutaneous every 8 hours  insulin lispro (HumaLOG) corrective regimen sliding scale   SubCutaneous three times a day before meals  metoprolol tartrate 50 milliGRAM(s) Oral two times a day  pantoprazole    Tablet 40 milliGRAM(s) Oral before breakfast  ranolazine 1000 milliGRAM(s) Oral two times a day  tamsulosin 0.4 milliGRAM(s) Oral daily  ticagrelor 60 milliGRAM(s) Oral two times a day      MEDICATIONS  (PRN):  dextrose Gel 1 Dose(s) Oral once PRN Blood Glucose LESS THAN 70 milliGRAM(s)/deciliter  glucagon  Injectable 1 milliGRAM(s) IntraMuscular once PRN Glucose LESS THAN 70 milligrams/deciliter      Physical Exam      Neuro :  no focal deficits  Respiratory: CTA B/L  CV: RRR, S1S2, no murmurs,   Abdominal: Soft, NT, ND +BS,  Extremities: No edema, + peripheral pulses    ASSESSMENT      left side weakness likely 2nd to chronic multilevel deformities  s/p fall,   cns patho r/o,   Acute renal failure  h/o GERD (gastroesophageal reflux disease)  CAD (coronary artery disease)  DM (diabetes mellitus)  HLD (hyperlipidemia)  HTN (hypertension)  No significant past surgical history      PLAN    cont tele  cont aspirin, statin,   ce q8 x 2 noted above  mri brain with neg for acute ischemia  mri ls spine with Multilevel compression deformities, chronic    phys tx  neuro cons noted  cont current meds.    d/c planning

## 2018-04-25 NOTE — DISCHARGE NOTE ADULT - CARE PLAN
Principal Discharge DX:	Bilateral lower extremity pain  Goal:	to continue with medications  Assessment and plan of treatment:	You were admitted for b/l lower leg pain , we did CT scan and MRI that showed no stroke but mild spinal narrowing, continue with your medications and follow up with your neurologist a  Secondary Diagnosis:	CAD (coronary artery disease)  Assessment and plan of treatment:	continue with your medications  Secondary Diagnosis:	RITESH (acute kidney injury)  Assessment and plan of treatment:	Your kidney function was decreased, but now improving, continuue to keep yourself well hydrated  follow up with your primary medical doctor  Secondary Diagnosis:	DM (diabetes mellitus)  Assessment and plan of treatment:	continue with your medications  Secondary Diagnosis:	HTN (hypertension)  Assessment and plan of treatment:	continue with your medications  Secondary Diagnosis:	HLD (hyperlipidemia)  Assessment and plan of treatment:	continue with your medications

## 2018-04-25 NOTE — DISCHARGE NOTE ADULT - HOSPITAL COURSE
adsgfaf 81 y/o M pt w/ PMHx of HTN, HLD, DM, CAD, GERD, OA BIBA for suspected L sided weakness. Patient states that he was getting out of bed when suddenly his legs gave out, and he fell. He denies any episodes of near-syncope or syncope. Denies hitting his head. He said he was unable to get up due to b/l leg weakness. His wife called EMS. Patient denies bowel/bladder incontinence. Denies saddle anesthesia. Patient denies lightheadedness or dizziness. He said he had a similar episode last year when he was admitted to [facility] in [facility]. Per patient he had an EEG done there which was normal. Denies slurred speech or any other complaints besides b/l LE weakness at this time. 81 y/o M pt w/ PMHx of HTN, HLD, DM, CAD, GERD, OA BIBA for suspected L sided weakness. Patient states that he was getting out of bed when suddenly his legs gave out, and he fell. He denies any episodes of near-syncope or syncope. Denies hitting his head. He said he was unable to get up due to b/l leg weakness. His wife called EMS. Patient denies bowel/bladder incontinence. Denies saddle anesthesia. Patient denies lightheadedness or dizziness. He said he had a similar episode last year when he was admitted to [facility] in [facility]. Per patient he had an EEG done there which was normal. Denies slurred speech or any other complaints besides b/l LE weakness at this time.  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 No acute findings or significant spinal stenosis, Neurology Dr. Solis was consulted recommended to do home physical therapy. Patient clinically improved      Given patient's improved clinical status and current hemodynamic stability, decision was made to discharge. Discussed with attending  Please refer to patient's complete medical chart with documents for a full hospital course, for this is only a brief summary.

## 2018-04-25 NOTE — DISCHARGE NOTE ADULT - PATIENT PORTAL LINK FT
You can access the [facility] Patient Portal, offered by [facility], by registering with the following website: [email or web address]

## 2018-04-25 NOTE — DISCHARGE NOTE ADULT - SECONDARY DIAGNOSIS.
CAD (coronary artery disease) RITESH (acute kidney injury) DM (diabetes mellitus) HTN (hypertension) HLD (hyperlipidemia)

## 2018-04-25 NOTE — DISCHARGE NOTE ADULT - PLAN OF CARE
to continue with medications You were admitted for b/l lower leg pain , we did CT scan and MRI that showed no stroke but mild spinal narrowing, continue with your medications and follow up with your neurologist a continue with your medications Your kidney function was decreased, but now improving, continuue to keep yourself well hydrated  follow up with your primary medical doctor

## 2018-04-25 NOTE — DISCHARGE NOTE ADULT - MEDICATION SUMMARY - MEDICATIONS TO TAKE
I will START or STAY ON the medications listed below when I get home from the hospital:    aspirin 81 mg oral tablet  -- 1 tab(s) by mouth once a day  -- Indication: For Ascvd    acetaminophen 325 mg oral tablet  -- 2 tab(s) by mouth every 6 hours, As needed, Mild, Moderate and Severe Pain  -- Indication: For Analegesic    lisinopril 2.5 mg oral tablet  -- Indication: For HTN (hypertension)    tamsulosin 0.4 mg oral capsule  -- 1 cap(s) by mouth once a day  -- Indication: For BPH    ranolazine 1000 mg oral tablet, extended release  -- 1 tab(s) by mouth 2 times a day  -- Indication: For ASCVD    glimepiride 1 mg oral tablet  -- 1 tab(s) by mouth 2 times a day  -- Indication: For DM (diabetes mellitus)    Tradjenta 5 mg oral tablet  -- Indication: For DM (diabetes mellitus)    allopurinol 100 mg oral tablet  -- 1 tab(s) by mouth once a day  -- Indication: For GOUT    Vascepa  -- Indication: For HLD (hyperlipidemia)    atorvastatin 20 mg oral tablet  -- 1 tab(s) by mouth once a day  -- Indication: For HLD (hyperlipidemia)    Brilinta (ticagrelor) 60 mg oral tablet  -- 1 tab(s) by mouth 2 times a day  -- Indication: For ASCVD    tenofovir 300 mg oral tablet  -- 1 tab(s) by mouth once a day  -- Indication: For AnTIVIRAL    metoprolol tartrate 25 mg oral tablet  -- 1 tab(s) by mouth 2 times a day  -- Indication: For HTN (hypertension)    furosemide 40 mg oral tablet  -- 1 tab(s) by mouth once a day  -- Indication: For HTN (hypertension)    azelastine 0.05% ophthalmic solution  -- 1 drop(s) to each affected eye 2 times a day  -- Indication: For EYE OINTMENT    folic acid 1 mg oral tablet  -- 1 tab(s) by mouth once a day  -- Indication: For Supplement    cyanocobalamin 1000 mcg oral tablet  -- 1 tab(s) by mouth once a day  -- Indication: For Supplement

## 2018-04-25 NOTE — DISCHARGE NOTE ADULT - CARE PROVIDER_API CALL
Ana Solis), Neurology  [address] [place] [address]  [address] [place] [address]  [place] [address]  Phone: [phone]  Fax: [phone]

## 2018-04-26 LAB — T PALLIDUM AB TITR SER: NEGATIVE — SIGNIFICANT CHANGE UP

## 2018-04-30 NOTE — ED PROVIDER NOTE - DURATION
Received refill request for:  amlodipine  Last OV was: 11/27/2017 with AIDAN Wu  Labs/EKG: n/a  F/U scheduled: orders in Epic for 11/2018  New script sent to: [facility]   today

## 2018-05-07 PROBLEM — Z00.00 ENCOUNTER FOR PREVENTIVE HEALTH EXAMINATION: Status: ACTIVE | Noted: 2018-05-07

## 2018-06-06 ENCOUNTER — APPOINTMENT (OUTPATIENT)
Dept: NEUROLOGY | Facility: CLINIC | Age: 83
End: 2018-06-06
Payer: MEDICARE

## 2018-06-06 VITALS
WEIGHT: 140 LBS | HEIGHT: 64 IN | SYSTOLIC BLOOD PRESSURE: 118 MMHG | DIASTOLIC BLOOD PRESSURE: 62 MMHG | HEART RATE: 56 BPM | BODY MASS INDEX: 23.9 KG/M2 | TEMPERATURE: 97.5 F

## 2018-06-06 DIAGNOSIS — Z86.79 PERSONAL HISTORY OF OTHER DISEASES OF THE CIRCULATORY SYSTEM: ICD-10-CM

## 2018-06-06 DIAGNOSIS — Z87.01 PERSONAL HISTORY OF PNEUMONIA (RECURRENT): ICD-10-CM

## 2018-06-06 DIAGNOSIS — Z86.39 PERSONAL HISTORY OF OTHER ENDOCRINE, NUTRITIONAL AND METABOLIC DISEASE: ICD-10-CM

## 2018-06-06 DIAGNOSIS — R26.9 UNSPECIFIED ABNORMALITIES OF GAIT AND MOBILITY: ICD-10-CM

## 2018-06-06 DIAGNOSIS — M54.9 DORSALGIA, UNSPECIFIED: ICD-10-CM

## 2018-06-06 PROCEDURE — 99215 OFFICE O/P EST HI 40 MIN: CPT

## 2018-06-06 RX ORDER — FOLIC ACID 1 MG/1
1 TABLET ORAL
Refills: 0 | Status: ACTIVE | COMMUNITY

## 2018-06-06 RX ORDER — ACETAMINOPHEN 325 MG/1
325 TABLET ORAL
Refills: 0 | Status: ACTIVE | COMMUNITY

## 2018-06-06 RX ORDER — LINAGLIPTIN 5 MG/1
5 TABLET, FILM COATED ORAL
Refills: 0 | Status: ACTIVE | COMMUNITY

## 2018-06-06 RX ORDER — ICOSAPENT ETHYL 1000 MG/1
1 CAPSULE ORAL
Refills: 0 | Status: ACTIVE | COMMUNITY

## 2018-06-06 RX ORDER — TAMSULOSIN HYDROCHLORIDE 0.4 MG/1
0.4 CAPSULE ORAL
Refills: 0 | Status: ACTIVE | COMMUNITY

## 2018-06-06 RX ORDER — TENOFOVIR DISOPROXIL FUMARATE 300 MG/1
300 TABLET, COATED ORAL
Refills: 0 | Status: ACTIVE | COMMUNITY

## 2018-06-06 RX ORDER — RANOLAZINE 1000 MG/1
1000 TABLET, FILM COATED, EXTENDED RELEASE ORAL
Refills: 0 | Status: ACTIVE | COMMUNITY

## 2018-06-06 RX ORDER — CYANOCOBALAMIN (VITAMIN B-12) 1000 MCG
1000 TABLET ORAL
Refills: 0 | Status: ACTIVE | COMMUNITY

## 2018-06-06 RX ORDER — AZELASTINE HYDROCHLORIDE 0.5 MG/ML
0.05 SOLUTION/ DROPS OPHTHALMIC
Refills: 0 | Status: ACTIVE | COMMUNITY

## 2018-06-06 RX ORDER — GLIMEPIRIDE 1 MG/1
1 TABLET ORAL
Refills: 0 | Status: ACTIVE | COMMUNITY

## 2018-06-06 RX ORDER — ALLOPURINOL 100 MG/1
100 TABLET ORAL
Refills: 0 | Status: ACTIVE | COMMUNITY

## 2018-06-06 RX ORDER — GABAPENTIN 100 MG/1
100 CAPSULE ORAL 3 TIMES DAILY
Qty: 90 | Refills: 2 | Status: ACTIVE | COMMUNITY
Start: 2018-06-06 | End: 1900-01-01

## 2018-07-26 NOTE — PATIENT PROFILE ADULT. - NS PRO MODE OF ARRIVAL
Electrodesiccation And Curettage Text: The wound bed was treated with electrodesiccation and curettage after the biopsy was performed. stretcher Electrodesiccation Text: The wound bed was treated with electrodesiccation after the biopsy was performed. Size Of Lesion In Cm: 0.7 Dressing: bandage Cryotherapy Text: The wound bed was treated with cryotherapy after the biopsy was performed. Curettage Text: The wound bed was treated with curettage after the biopsy was performed. Silver Nitrate Text: The wound bed was treated with silver nitrate after the biopsy was performed. Wound Care: Vaseline Additional Anesthesia Volume In Cc (Will Not Render If 0): 0 Bill [id number] For Specimen Handling/Conveyance To Laboratory?: no Detail Level: Detailed Post-Care Instructions: I reviewed with the patient in detail post-care instructions. Patient is to keep the biopsy site dry overnight, and then apply Vaseline twice daily under bandaid until healed. Pt was instructed on signs of infection and to call with concerns. Lab: [id number] Biopsy Type: H and E Notification Instructions: Patient will be notified of biopsy results. However, patient instructed to call the office if not contacted within 2 weeks. Depth Of Biopsy: dermis Anesthesia Type: 1% lidocaine with epinephrine Type Of Destruction Used: Curettage Render Post-Care Instructions In Note?: yes Hemostasis: Drysol Billing Type: Third-Party Bill Consent: Written consent was obtained and risks were reviewed including but not limited to scarring, infection, bleeding, scabbing, incomplete removal, nerve damage and allergy to anesthesia. Lab Facility: [id number] Anesthesia Volume In Cc (Will Not Render If 0): 0.8

## 2018-09-17 ENCOUNTER — APPOINTMENT (OUTPATIENT)
Dept: CARDIOLOGY | Facility: CLINIC | Age: 83
End: 2018-09-17
Payer: MEDICARE

## 2018-09-17 VITALS
DIASTOLIC BLOOD PRESSURE: 62 MMHG | TEMPERATURE: 97.6 F | RESPIRATION RATE: 17 BRPM | SYSTOLIC BLOOD PRESSURE: 113 MMHG | WEIGHT: 138 LBS | OXYGEN SATURATION: 100 % | HEART RATE: 66 BPM | BODY MASS INDEX: 23.69 KG/M2

## 2018-09-17 DIAGNOSIS — Z98.61 CORONARY ANGIOPLASTY STATUS: ICD-10-CM

## 2018-09-17 DIAGNOSIS — E78.5 HYPERLIPIDEMIA, UNSPECIFIED: ICD-10-CM

## 2018-09-17 DIAGNOSIS — I10 ESSENTIAL (PRIMARY) HYPERTENSION: ICD-10-CM

## 2018-09-17 DIAGNOSIS — I25.10 ATHEROSCLEROTIC HEART DISEASE OF NATIVE CORONARY ARTERY W/OUT ANGINA PECTORIS: ICD-10-CM

## 2018-09-17 DIAGNOSIS — R07.9 CHEST PAIN, UNSPECIFIED: ICD-10-CM

## 2018-09-17 PROBLEM — K21.9 GASTRO-ESOPHAGEAL REFLUX DISEASE WITHOUT ESOPHAGITIS: Chronic | Status: ACTIVE | Noted: 2018-04-23

## 2018-09-17 PROBLEM — E11.9 TYPE 2 DIABETES MELLITUS WITHOUT COMPLICATIONS: Chronic | Status: ACTIVE | Noted: 2018-04-23

## 2018-09-17 PROCEDURE — 93000 ELECTROCARDIOGRAM COMPLETE: CPT

## 2018-09-17 PROCEDURE — 71046 X-RAY EXAM CHEST 2 VIEWS: CPT

## 2018-09-17 PROCEDURE — 99215 OFFICE O/P EST HI 40 MIN: CPT

## 2018-09-18 ENCOUNTER — APPOINTMENT (OUTPATIENT)
Dept: NEUROLOGY | Facility: CLINIC | Age: 83
End: 2018-09-18

## 2018-09-24 VITALS
RESPIRATION RATE: 17 BRPM | HEART RATE: 60 BPM | SYSTOLIC BLOOD PRESSURE: 119 MMHG | TEMPERATURE: 97 F | OXYGEN SATURATION: 96 % | DIASTOLIC BLOOD PRESSURE: 52 MMHG

## 2018-09-24 NOTE — H&P ADULT - HISTORY OF PRESENT ILLNESS
81 y/o M WITH CONTRAST ALLERGY w/ PMH of HTN, HLD, HFpEF 66%, BPH, CKD (last Cr 1.9), Hep. B, CAD, DM, GERD, 7/12 s/p [facility] in [place] stent placed to LAD and RCA presented to his doctor c/o intermittent CP which is getting worse and feels like pressure without radiation, lasting minutes and increasing with exertion accompanied with SOB. Pt endorses severely limited ET, symptoms are worsening over one month and feel similar to his pain prior to his PCI. Pt denies palpitations, LE edema, PND/orthopnea, syncope, dizziness, n/v, fever/chilld, blood in the stool. Pt's last ECHO on 04/24/18 indicates mild MR, mild concentric LVH, grossly normal LV systolic function with EF of 50-55%, grade I diastolic dysfunction  In light of pt's risk factors, symptoms mentioned above and prior history of CAD, pt is now referred to [facility] for recommended cardiac cath with possible intervention. SKELETON    CALLED MULTIPLE TIMES, PT DID NOT ANSWER, WAS NOT ABLE TO PREMEDICATE THE PT.     81 y/o M WITH CONTRAST ALLERGY Icelandic SPEAKING w/ PMH of HTN, HLD, HFpEF 66%, BPH, CKD (last Cr 1.9), Hep. B, CAD, DM, GERD, 7/12 s/p [facility] in [facility] stent placed to LAD and RCA presented to his doctor c/o intermittent CP which is getting worse and feels like pressure without radiation, lasting minutes and increasing with exertion accompanied with SOB. Pt endorses severely limited ET, symptoms are worsening over one month and feel similar to his pain prior to his PCI. Pt denies palpitations, LE edema, PND/orthopnea, syncope, dizziness, n/v, fever/chilld, blood in the stool. Pt's last ECHO on 04/24/18 indicates mild MR, mild concentric LVH, grossly normal LV systolic function with EF of 50-55%, grade I diastolic dysfunction  In light of pt's risk factors, symptoms mentioned above and prior history of CAD, pt is now referred to [facility] for recommended cardiac cath with possible intervention. 83 y/o M, Telugu speaking, poor historian, Unclear history of contrast allergy vs. chlorhexidine allergy (patient states he developed face/chest rash during prior procedure after prepping) w/ PMH of HTN, HLD, HFpEF 66%, BPH, CKD (baselin Cr 1.7 - 1.9), Hep. B, CAD, DM, GERD, CAD w/ prior PCI in the LAD and RCA with most recent diagnostic Cath @ [facility] 7/12/17 revealing patient LAD and RCA stents who presents to his doctor c/o intermittent CP which is getting worse and feels like pressure without radiation, lasting minutes and increasing with exertion accompanied with SOB. Pt endorses severely limited ET, symptoms are worsening over one month and feel similar to his pain prior to his PCI. Pt denies palpitations, LE edema, PND/orthopnea, syncope, dizziness, n/v, fever/chilld, blood in the stool. Pt's last ECHO on 04/24/18 indicates mild MR, mild concentric LVH, grossly normal LV systolic function with EF of 50-55%, grade I diastolic dysfunction  In light of pt's risk factors, symptoms mentioned above and prior history of CAD, pt is now referred to [facility] for recommended cardiac cath with possible intervention.

## 2018-09-24 NOTE — H&P ADULT - ATTENDING COMMENTS
HTN hyperlipidemia CKD Cr 1.7  CAD  mult PCI with worsening CP.   S/p ISR LAD. PTCA.  Also severe PVD. For LE art US tomorrow.

## 2018-09-24 NOTE — H&P ADULT - PMH
CAD (coronary artery disease)    CHF (congestive heart failure)    CKD (chronic kidney disease)    Diabetes mellitus    DM (diabetes mellitus)    GERD (gastroesophageal reflux disease)    HLD (hyperlipidemia)    HTN (hypertension)    HTN (hypertension)

## 2018-09-24 NOTE — H&P ADULT - ADDITIONAL PE
ASA III  Mallampati III    EKG: NSR 1st AV, ST elevation vs. j point elevation II,III, AVF, TWI leads V2-V3

## 2018-09-24 NOTE — H&P ADULT - ASSESSMENT
83 y/o M, Greenlandic speaking, poor historian, Unclear history of contrast allergy vs. chlorhexidine allergy (patient states he developed face/chest rash during prior procedure after prepping) w/ PMH of HTN, HLD, HFpEF 66%, BPH, CKD (baselin Cr 1.7 - 1.9), Hep. B, CAD, DM, GERD, CAD w/ prior PCI in the LAD and RCA with most recent diagnostic Cath @ [facility] 7/12/17 revealing patient LAD and RCA stents who presents with CCS III anginal symptoms and is recommended for cardiac catheterization    - patient given home Brilint 90mg and ASA 81mg prior to procedure  - Unclear if patient with contrast dye allergy vs. chlorhexidine allergy.     Risks & benefits of procedure and alternative therapy have been explained to the patient including but not limited to: allergic reaction, bleeding w/possible need for blood transfusion, infection, renal and vascular compromise, limb damage, arrhythmia, stroke, vessel dissection/perforation, Myocardial infarction, emergent CABG. Informed consent obtained and in chart. 81 y/o M, Estonian speaking, poor historian, Unclear history of contrast allergy vs. chlorhexidine allergy (patient states he developed face/chest rash during prior procedure after prepping) w/ PMH of HTN, HLD, HFpEF 66%, BPH, CKD (baselin Cr 1.7 - 1.9), Hep. B, CAD, DM, GERD, CAD w/ prior PCI in the LAD and RCA with most recent diagnostic Cath @ [facility] 7/12/17 revealing patient LAD and RCA stents who presents with CCS III anginal symptoms and is recommended for cardiac catheterization    - patient given home Brilinta 90mg and ASA 81mg prior to procedure  - CKD, Crt 2.02 at baseline.  dr Craig aware.    - Unclear if patient with contrast dye allergy vs. chlorhexidine allergy.  Per Dr Craig patient pre medicated with solucortef 200mg IV and benadryl 50 IV on call to the cath lab table.     Risks & benefits of procedure and alternative therapy have been explained to the patient including but not limited to: allergic reaction, bleeding w/possible need for blood transfusion, infection, renal and vascular compromise, limb damage, arrhythmia, stroke, vessel dissection/perforation, Myocardial infarction, emergent CABG. Informed consent obtained and in chart.

## 2018-09-25 ENCOUNTER — INPATIENT (INPATIENT)
Facility: HOSPITAL | Age: 83
LOS: 0 days | Discharge: HOME | DRG: 251 | End: 2018-09-26
Attending: INTERNAL MEDICINE | Admitting: INTERNAL MEDICINE
Payer: COMMERCIAL

## 2018-09-25 LAB
ALBUMIN SERPL ELPH-MCNC: 4.1 G/DL — SIGNIFICANT CHANGE UP (ref 3.3–5)
ALP SERPL-CCNC: 72 U/L — SIGNIFICANT CHANGE UP (ref 40–120)
ALT FLD-CCNC: 18 U/L — SIGNIFICANT CHANGE UP (ref 10–45)
ANION GAP SERPL CALC-SCNC: 12 MMOL/L — SIGNIFICANT CHANGE UP (ref 5–17)
APTT BLD: 27.8 SEC — SIGNIFICANT CHANGE UP (ref 27.5–37.4)
AST SERPL-CCNC: 16 U/L — SIGNIFICANT CHANGE UP (ref 10–40)
BASOPHILS NFR BLD AUTO: 0.3 % — SIGNIFICANT CHANGE UP (ref 0–2)
BILIRUB SERPL-MCNC: 0.4 MG/DL — SIGNIFICANT CHANGE UP (ref 0.2–1.2)
BUN SERPL-MCNC: 43 MG/DL — HIGH (ref 7–23)
CALCIUM SERPL-MCNC: 9 MG/DL — SIGNIFICANT CHANGE UP (ref 8.4–10.5)
CHLORIDE SERPL-SCNC: 103 MMOL/L — SIGNIFICANT CHANGE UP (ref 96–108)
CHOLEST SERPL-MCNC: 150 MG/DL — SIGNIFICANT CHANGE UP (ref 10–199)
CO2 SERPL-SCNC: 24 MMOL/L — SIGNIFICANT CHANGE UP (ref 22–31)
CREAT SERPL-MCNC: 2.02 MG/DL — HIGH (ref 0.5–1.3)
CRP SERPL-MCNC: 0.1 MG/DL — SIGNIFICANT CHANGE UP (ref 0–0.4)
EOSINOPHIL NFR BLD AUTO: 5.8 % — SIGNIFICANT CHANGE UP (ref 0–6)
GLUCOSE SERPL-MCNC: 100 MG/DL — HIGH (ref 70–99)
HBA1C BLD-MCNC: 5.8 % — HIGH (ref 4–5.6)
HCT VFR BLD CALC: 35.5 % — LOW (ref 39–50)
HDLC SERPL-MCNC: 43 MG/DL — SIGNIFICANT CHANGE UP
HGB BLD-MCNC: 11.7 G/DL — LOW (ref 13–17)
INR BLD: 1.01 — SIGNIFICANT CHANGE UP (ref 0.88–1.16)
LIPID PNL WITH DIRECT LDL SERPL: 59 MG/DL — SIGNIFICANT CHANGE UP
LYMPHOCYTES # BLD AUTO: 18.6 % — SIGNIFICANT CHANGE UP (ref 13–44)
MCHC RBC-ENTMCNC: 33 G/DL — SIGNIFICANT CHANGE UP (ref 32–36)
MCHC RBC-ENTMCNC: 33.2 PG — SIGNIFICANT CHANGE UP (ref 27–34)
MCV RBC AUTO: 100.9 FL — HIGH (ref 80–100)
MONOCYTES NFR BLD AUTO: 16 % — HIGH (ref 2–14)
NEUTROPHILS NFR BLD AUTO: 59.3 % — SIGNIFICANT CHANGE UP (ref 43–77)
PLATELET # BLD AUTO: 223 K/UL — SIGNIFICANT CHANGE UP (ref 150–400)
POTASSIUM SERPL-MCNC: 4.7 MMOL/L — SIGNIFICANT CHANGE UP (ref 3.5–5.3)
POTASSIUM SERPL-SCNC: 4.7 MMOL/L — SIGNIFICANT CHANGE UP (ref 3.5–5.3)
PROT SERPL-MCNC: 6.9 G/DL — SIGNIFICANT CHANGE UP (ref 6–8.3)
PROTHROM AB SERPL-ACNC: 11.2 SEC — SIGNIFICANT CHANGE UP (ref 9.8–12.7)
RBC # BLD: 3.52 M/UL — LOW (ref 4.2–5.8)
RBC # FLD: 13.2 % — SIGNIFICANT CHANGE UP (ref 10.3–16.9)
SODIUM SERPL-SCNC: 139 MMOL/L — SIGNIFICANT CHANGE UP (ref 135–145)
TOTAL CHOLESTEROL/HDL RATIO MEASUREMENT: 3.5 RATIO — SIGNIFICANT CHANGE UP (ref 3.4–9.6)
TRIGL SERPL-MCNC: 238 MG/DL — HIGH (ref 10–149)
WBC # BLD: 8 K/UL — SIGNIFICANT CHANGE UP (ref 3.8–10.5)
WBC # FLD AUTO: 8 K/UL — SIGNIFICANT CHANGE UP (ref 3.8–10.5)

## 2018-09-25 PROCEDURE — 92920 PRQ TRLUML C ANGIOP 1ART&/BR: CPT | Mod: LD

## 2018-09-25 PROCEDURE — 93010 ELECTROCARDIOGRAM REPORT: CPT

## 2018-09-25 PROCEDURE — 93454 CORONARY ARTERY ANGIO S&I: CPT | Mod: 26,XU

## 2018-09-25 PROCEDURE — 99222 1ST HOSP IP/OBS MODERATE 55: CPT

## 2018-09-25 RX ORDER — DEXTROSE 50 % IN WATER 50 %
25 SYRINGE (ML) INTRAVENOUS ONCE
Qty: 0 | Refills: 0 | Status: DISCONTINUED | OUTPATIENT
Start: 2018-09-25 | End: 2018-09-26

## 2018-09-25 RX ORDER — TAMSULOSIN HYDROCHLORIDE 0.4 MG/1
0.4 CAPSULE ORAL AT BEDTIME
Qty: 0 | Refills: 0 | Status: DISCONTINUED | OUTPATIENT
Start: 2018-09-25 | End: 2018-09-26

## 2018-09-25 RX ORDER — SODIUM CHLORIDE 9 MG/ML
1000 INJECTION, SOLUTION INTRAVENOUS
Qty: 0 | Refills: 0 | Status: DISCONTINUED | OUTPATIENT
Start: 2018-09-25 | End: 2018-09-26

## 2018-09-25 RX ORDER — ISOSORBIDE MONONITRATE 60 MG/1
30 TABLET, EXTENDED RELEASE ORAL DAILY
Qty: 0 | Refills: 0 | Status: DISCONTINUED | OUTPATIENT
Start: 2018-09-25 | End: 2018-09-26

## 2018-09-25 RX ORDER — ATORVASTATIN CALCIUM 80 MG/1
1 TABLET, FILM COATED ORAL
Qty: 0 | Refills: 0 | COMMUNITY

## 2018-09-25 RX ORDER — DIPHENHYDRAMINE HCL 50 MG
50 CAPSULE ORAL ONCE
Qty: 0 | Refills: 0 | Status: DISCONTINUED | OUTPATIENT
Start: 2018-09-25 | End: 2018-09-25

## 2018-09-25 RX ORDER — TENOFOVIR DISOPROXIL FUMARATE 300 MG/1
1 TABLET, FILM COATED ORAL
Qty: 0 | Refills: 0 | COMMUNITY

## 2018-09-25 RX ORDER — AZELASTINE HCL 0.05 %
1 DROPS OPHTHALMIC (EYE)
Qty: 0 | Refills: 0 | COMMUNITY

## 2018-09-25 RX ORDER — TICAGRELOR 90 MG/1
90 TABLET ORAL
Qty: 0 | Refills: 0 | Status: DISCONTINUED | OUTPATIENT
Start: 2018-09-25 | End: 2018-09-26

## 2018-09-25 RX ORDER — LISINOPRIL 2.5 MG/1
0 TABLET ORAL
Qty: 0 | Refills: 0 | COMMUNITY

## 2018-09-25 RX ORDER — FOLIC ACID 0.8 MG
1 TABLET ORAL DAILY
Qty: 0 | Refills: 0 | Status: DISCONTINUED | OUTPATIENT
Start: 2018-09-25 | End: 2018-09-26

## 2018-09-25 RX ORDER — INSULIN LISPRO 100/ML
VIAL (ML) SUBCUTANEOUS
Qty: 0 | Refills: 0 | Status: DISCONTINUED | OUTPATIENT
Start: 2018-09-25 | End: 2018-09-26

## 2018-09-25 RX ORDER — ICOSAPENT ETHYL 500 MG/1
0 CAPSULE, LIQUID FILLED ORAL
Qty: 0 | Refills: 0 | COMMUNITY

## 2018-09-25 RX ORDER — RANOLAZINE 500 MG/1
1000 TABLET, FILM COATED, EXTENDED RELEASE ORAL
Qty: 0 | Refills: 0 | Status: DISCONTINUED | OUTPATIENT
Start: 2018-09-25 | End: 2018-09-26

## 2018-09-25 RX ORDER — ALLOPURINOL 300 MG
1 TABLET ORAL
Qty: 0 | Refills: 0 | COMMUNITY

## 2018-09-25 RX ORDER — DEXTROSE 50 % IN WATER 50 %
12.5 SYRINGE (ML) INTRAVENOUS ONCE
Qty: 0 | Refills: 0 | Status: DISCONTINUED | OUTPATIENT
Start: 2018-09-25 | End: 2018-09-26

## 2018-09-25 RX ORDER — CHLORHEXIDINE GLUCONATE 213 G/1000ML
1 SOLUTION TOPICAL ONCE
Qty: 0 | Refills: 0 | Status: DISCONTINUED | OUTPATIENT
Start: 2018-09-25 | End: 2018-09-25

## 2018-09-25 RX ORDER — ATORVASTATIN CALCIUM 80 MG/1
40 TABLET, FILM COATED ORAL AT BEDTIME
Qty: 0 | Refills: 0 | Status: DISCONTINUED | OUTPATIENT
Start: 2018-09-25 | End: 2018-09-26

## 2018-09-25 RX ORDER — TICAGRELOR 90 MG/1
90 TABLET ORAL ONCE
Qty: 0 | Refills: 0 | Status: COMPLETED | OUTPATIENT
Start: 2018-09-25 | End: 2018-09-25

## 2018-09-25 RX ORDER — SODIUM CHLORIDE 9 MG/ML
1000 INJECTION INTRAMUSCULAR; INTRAVENOUS; SUBCUTANEOUS
Qty: 0 | Refills: 0 | Status: DISCONTINUED | OUTPATIENT
Start: 2018-09-25 | End: 2018-09-25

## 2018-09-25 RX ORDER — ASPIRIN/CALCIUM CARB/MAGNESIUM 324 MG
81 TABLET ORAL ONCE
Qty: 0 | Refills: 0 | Status: COMPLETED | OUTPATIENT
Start: 2018-09-25 | End: 2018-09-25

## 2018-09-25 RX ORDER — HYDROCORTISONE 20 MG
200 TABLET ORAL ONCE
Qty: 0 | Refills: 0 | Status: COMPLETED | OUTPATIENT
Start: 2018-09-25 | End: 2018-09-25

## 2018-09-25 RX ORDER — SODIUM CHLORIDE 9 MG/ML
1000 INJECTION INTRAMUSCULAR; INTRAVENOUS; SUBCUTANEOUS
Qty: 0 | Refills: 0 | Status: DISCONTINUED | OUTPATIENT
Start: 2018-09-25 | End: 2018-09-26

## 2018-09-25 RX ORDER — ASPIRIN/CALCIUM CARB/MAGNESIUM 324 MG
81 TABLET ORAL DAILY
Qty: 0 | Refills: 0 | Status: DISCONTINUED | OUTPATIENT
Start: 2018-09-25 | End: 2018-09-26

## 2018-09-25 RX ORDER — GLUCAGON INJECTION, SOLUTION 0.5 MG/.1ML
1 INJECTION, SOLUTION SUBCUTANEOUS ONCE
Qty: 0 | Refills: 0 | Status: DISCONTINUED | OUTPATIENT
Start: 2018-09-25 | End: 2018-09-26

## 2018-09-25 RX ORDER — METOPROLOL TARTRATE 50 MG
1 TABLET ORAL
Qty: 0 | Refills: 0 | COMMUNITY

## 2018-09-25 RX ORDER — TICAGRELOR 90 MG/1
1 TABLET ORAL
Qty: 0 | Refills: 0 | COMMUNITY

## 2018-09-25 RX ORDER — AMLODIPINE BESYLATE 2.5 MG/1
10 TABLET ORAL DAILY
Qty: 0 | Refills: 0 | Status: DISCONTINUED | OUTPATIENT
Start: 2018-09-25 | End: 2018-09-26

## 2018-09-25 RX ORDER — DEXTROSE 50 % IN WATER 50 %
15 SYRINGE (ML) INTRAVENOUS ONCE
Qty: 0 | Refills: 0 | Status: DISCONTINUED | OUTPATIENT
Start: 2018-09-25 | End: 2018-09-26

## 2018-09-25 RX ORDER — SODIUM CHLORIDE 9 MG/ML
500 INJECTION INTRAMUSCULAR; INTRAVENOUS; SUBCUTANEOUS
Qty: 0 | Refills: 0 | Status: DISCONTINUED | OUTPATIENT
Start: 2018-09-25 | End: 2018-09-25

## 2018-09-25 RX ADMIN — TAMSULOSIN HYDROCHLORIDE 0.4 MILLIGRAM(S): 0.4 CAPSULE ORAL at 21:37

## 2018-09-25 RX ADMIN — Medication 200 MILLIGRAM(S): at 14:05

## 2018-09-25 RX ADMIN — Medication 81 MILLIGRAM(S): at 13:38

## 2018-09-25 RX ADMIN — SODIUM CHLORIDE 75 MILLILITER(S): 9 INJECTION INTRAMUSCULAR; INTRAVENOUS; SUBCUTANEOUS at 13:39

## 2018-09-25 RX ADMIN — ATORVASTATIN CALCIUM 40 MILLIGRAM(S): 80 TABLET, FILM COATED ORAL at 21:37

## 2018-09-25 RX ADMIN — TICAGRELOR 90 MILLIGRAM(S): 90 TABLET ORAL at 21:37

## 2018-09-25 RX ADMIN — ISOSORBIDE MONONITRATE 30 MILLIGRAM(S): 60 TABLET, EXTENDED RELEASE ORAL at 21:36

## 2018-09-25 RX ADMIN — RANOLAZINE 1000 MILLIGRAM(S): 500 TABLET, FILM COATED, EXTENDED RELEASE ORAL at 21:37

## 2018-09-25 RX ADMIN — TICAGRELOR 90 MILLIGRAM(S): 90 TABLET ORAL at 13:37

## 2018-09-25 RX ADMIN — SODIUM CHLORIDE 75 MILLILITER(S): 9 INJECTION INTRAMUSCULAR; INTRAVENOUS; SUBCUTANEOUS at 18:25

## 2018-09-25 RX ADMIN — Medication 2: at 21:55

## 2018-09-25 NOTE — PROGRESS NOTE ADULT - SUBJECTIVE AND OBJECTIVE BOX
Interventional Cardiology PA Sheath Pull Note    Pt without complaints. VSS      Pre-Sheath Removal:    [ x] Right     [ ] Left          [ ] Groin    [ ] Brachial    [ ] 5Fr      [x ] 6Fr    [ ] 7Fr     [ ] 8Fr    [x ] Arterial  [ ] Venous sheath in place    [ ] Hematoma        [ ] Bleed    Pulses:    [ x] Right     [ ] Left       DP:  [x ]  Doppler   [ ]  Faint    [ ]   1+    [ ] 2+       Hemostasis Achieved with:        18         minutes manual pressure    Vasovagal Reaction: No    Meds Given: None      Post-Sheath Removal:    [x ] Right     [ ] Left          [ x] Groin    [ ] Brachial    [ ] Hematoma        [ ] Bleed       [ ] Bruit    Pulses:    [ x] Right     [ ] Left       DP:  [x ]  Doppler   [ ]  Faint    [ ]   1+    [ ] 2+     A/P:  s/p [ ] Dx Cath      [ ] IVUS/FFR     [x ] PTA/STENT       [ ] PTCA/STENT    -Continue bedrest (pt given instructions)  -Continue to monitor

## 2018-09-26 ENCOUNTER — TRANSCRIPTION ENCOUNTER (OUTPATIENT)
Age: 83
End: 2018-09-26

## 2018-09-26 VITALS — TEMPERATURE: 98 F

## 2018-09-26 LAB
ANION GAP SERPL CALC-SCNC: 11 MMOL/L — SIGNIFICANT CHANGE UP (ref 5–17)
BUN SERPL-MCNC: 47 MG/DL — HIGH (ref 7–23)
CALCIUM SERPL-MCNC: 8.5 MG/DL — SIGNIFICANT CHANGE UP (ref 8.4–10.5)
CHLORIDE SERPL-SCNC: 108 MMOL/L — SIGNIFICANT CHANGE UP (ref 96–108)
CO2 SERPL-SCNC: 23 MMOL/L — SIGNIFICANT CHANGE UP (ref 22–31)
CREAT SERPL-MCNC: 2.03 MG/DL — HIGH (ref 0.5–1.3)
GLUCOSE SERPL-MCNC: 110 MG/DL — HIGH (ref 70–99)
HCT VFR BLD CALC: 33 % — LOW (ref 39–50)
HGB BLD-MCNC: 10.4 G/DL — LOW (ref 13–17)
MAGNESIUM SERPL-MCNC: 2.6 MG/DL — SIGNIFICANT CHANGE UP (ref 1.6–2.6)
MCHC RBC-ENTMCNC: 31.5 G/DL — LOW (ref 32–36)
MCHC RBC-ENTMCNC: 32.4 PG — SIGNIFICANT CHANGE UP (ref 27–34)
MCV RBC AUTO: 102.8 FL — HIGH (ref 80–100)
PLATELET # BLD AUTO: 227 K/UL — SIGNIFICANT CHANGE UP (ref 150–400)
POTASSIUM SERPL-MCNC: 5.2 MMOL/L — SIGNIFICANT CHANGE UP (ref 3.5–5.3)
POTASSIUM SERPL-SCNC: 5.2 MMOL/L — SIGNIFICANT CHANGE UP (ref 3.5–5.3)
RBC # BLD: 3.21 M/UL — LOW (ref 4.2–5.8)
RBC # FLD: 13.3 % — SIGNIFICANT CHANGE UP (ref 10.3–16.9)
SODIUM SERPL-SCNC: 142 MMOL/L — SIGNIFICANT CHANGE UP (ref 135–145)
WBC # BLD: 10.1 K/UL — SIGNIFICANT CHANGE UP (ref 3.8–10.5)
WBC # FLD AUTO: 10.1 K/UL — SIGNIFICANT CHANGE UP (ref 3.8–10.5)

## 2018-09-26 PROCEDURE — 93925 LOWER EXTREMITY STUDY: CPT | Mod: 26

## 2018-09-26 PROCEDURE — 93010 ELECTROCARDIOGRAM REPORT: CPT

## 2018-09-26 PROCEDURE — 99238 HOSP IP/OBS DSCHRG MGMT 30/<: CPT

## 2018-09-26 PROCEDURE — 99231 SBSQ HOSP IP/OBS SF/LOW 25: CPT | Mod: 25

## 2018-09-26 RX ORDER — ASPIRIN/CALCIUM CARB/MAGNESIUM 324 MG
1 TABLET ORAL
Qty: 0 | Refills: 0 | DISCHARGE
Start: 2018-09-26

## 2018-09-26 RX ORDER — ASPIRIN/CALCIUM CARB/MAGNESIUM 324 MG
1 TABLET ORAL
Qty: 0 | Refills: 0 | COMMUNITY

## 2018-09-26 RX ADMIN — RANOLAZINE 1000 MILLIGRAM(S): 500 TABLET, FILM COATED, EXTENDED RELEASE ORAL at 06:36

## 2018-09-26 RX ADMIN — Medication 81 MILLIGRAM(S): at 12:36

## 2018-09-26 RX ADMIN — AMLODIPINE BESYLATE 10 MILLIGRAM(S): 2.5 TABLET ORAL at 06:36

## 2018-09-26 RX ADMIN — TICAGRELOR 90 MILLIGRAM(S): 90 TABLET ORAL at 06:36

## 2018-09-26 RX ADMIN — Medication 1 MILLIGRAM(S): at 12:36

## 2018-09-26 RX ADMIN — ISOSORBIDE MONONITRATE 30 MILLIGRAM(S): 60 TABLET, EXTENDED RELEASE ORAL at 12:36

## 2018-09-26 NOTE — CHART NOTE - NSCHARTNOTEFT_GEN_A_CORE
PA called to bedside as pt c/o CP. PA used Uzbek  ID# [id number] for interview with pt. Pt c/o 3/10 substernal, non-radiating CP which has been steadily improving since cath per pt, with no other associated symptoms per pt. Pt seen laying comfortably in bed. VSS, /66, HR 79, RR 16, O2 SpO2 98% on RA. EKG showed no acute changes. Physical exam unremarkable. Instructed RN to give pt his due medications including Ranexa 1000mg, Imdur 30mg, and Brilinta 90mg. Pt informed to make staff aware of any non-improving or worsening CP. Pt agrees and PA will continue to monitor.
5

## 2018-09-26 NOTE — PROVIDER CONTACT NOTE (OTHER) - ACTION/TREATMENT ORDERED:
EKG stat performed w/ no changes  02 therapy 2l/min  ranexa, imdur, brilinta given with relief of symptoms

## 2018-09-26 NOTE — DISCHARGE NOTE ADULT - MEDICATION SUMMARY - MEDICATIONS TO TAKE
I will START or STAY ON the medications listed below when I get home from the hospital:    aspirin 81 mg oral delayed release tablet  -- 1 tab(s) by mouth once a day  -- Indication: For Coronary Artery Disease    tamsulosin 0.4 mg oral capsule  -- 1 cap(s) by mouth once a day  -- Indication: For Prostate    Imdur 30 mg oral tablet, extended release  -- 1 tab(s) by mouth once a day (in the morning)  -- Indication: For Coronary artery disease and Blood pressure    ranolazine 1000 mg oral tablet, extended release  -- 1 tab(s) by mouth 2 times a day  -- Indication: For Coronary artery disease    Tradjenta 5 mg oral tablet  -- Indication: For Diabetes    glimepiride 1 mg oral tablet  -- 1 tab(s) by mouth 2 times a day  -- Indication: For Diabetes    Crestor 10 mg oral tablet  -- 1 tab(s) by mouth once a day (at bedtime)  -- Indication: For Cholesterol and coronary artery disease    Brilinta (ticagrelor) 90 mg oral tablet  -- 1 tab(s) by mouth 2 times a day  -- Indication: For Coronary artery disease and stent    amLODIPine 10 mg oral tablet  -- 1 tab(s) by mouth once a day  -- Indication: For Hypertension    folic acid 1 mg oral tablet  -- 1 tab(s) by mouth once a day  -- Indication: For supplement

## 2018-09-26 NOTE — DISCHARGE NOTE ADULT - SECONDARY DIAGNOSIS.
DM (diabetes mellitus) Stage 3 chronic kidney disease Essential hypertension Hyperlipidemia Chronic diastolic (congestive) heart failure

## 2018-09-26 NOTE — DISCHARGE NOTE ADULT - CARE PROVIDER_API CALL
Jay Avendano), Cardiovascular Disease; Internal Medicine  [address] [place] [address]  Phone: [phone]  Fax: [phone]

## 2018-09-26 NOTE — PROGRESS NOTE ADULT - SUBJECTIVE AND OBJECTIVE BOX
INTERVENTIONAL CARDIOLOGY FOLLOW UP NOTE    -Patient seen and examined this am  -No events overnight  -No complaints this am    VASCULAR ACCESS EXAM:     FEMORAL:    2+ right/left femoral pulse  2+ DP/PT pulses.  -Access site clean, non-tender, without ecchymosis or hematoma.    A/P  84 yo M with PMH HTN, HLD, Diastolic HF, CKD now s/p PCI of LAD ISR with cutting balloon via femoral approach with no evidence of vascular complications post procedure.     -continue with current medications including dual antiplatelet therapy   -discharge instructions as per protocol   -outpatient follow up with primary cardiologist

## 2018-09-26 NOTE — DISCHARGE NOTE ADULT - HOSPITAL COURSE
83 y/o M, Albanian speaking, poor historian, Unclear history of contrast allergy vs. chlorhexidine allergy (patient states he developed face/chest rash during prior procedure after prepping) w/ PMH of HTN, HLD, HFpEF 66%, BPH, CKD (baselin Cr 1.7 - 1.9), Hep. B, CAD, DM, GERD, CAD w/ prior PCI in the LAD and RCA with most recent diagnostic Cath @ [facility] 7/12/17 revealing patient LAD and RCA stents who presents to his doctor c/o intermittent CP which is getting worse and feels like pressure without radiation, lasting minutes and increasing with exertion accompanied with SOB. Pt endorses severely limited ET, symptoms are worsening over one month and feel similar to his pain prior to his PCI. Pt denies palpitations, LE edema, PND/orthopnea, syncope, dizziness, n/v, fever/chilld, blood in the stool. Pt's last ECHO on 04/24/18 indicates mild MR, mild concentric LVH, grossly normal LV systolic function with EF of 50-55%, grade I diastolic dysfunction  In light of pt's risk factors, symptoms mentioned above and prior history of CAD, pt is now referred to [facility] for recommended cardiac cath with possible intervention.     CATH 9/25: PTCA-->pLAD ISR, PTCA-->mLAD ISR, EF:50-55% (by Echo 4/2018). R groin 6F MAX@5:30PM.    Pt c/o 3/10 substernal, non-radiating CP which has been steadily improving since cath per pt, with no other associated symptoms per pt. Pt seen laying comfortably in bed. VSS, /66, HR 79, RR 16, O2 SpO2 98% on RA. EKG showed no acute changes 83 y/o M, Vietnamese speaking, poor historian, Unclear history of contrast allergy vs. chlorhexidine allergy (patient states he developed face/chest rash during prior procedure after prepping) w/ PMH of HTN, HLD, HFpEF 66%, BPH, CKD (baselin Cr 1.7 - 1.9), Hep. B, CAD, DM, GERD, CAD w/ prior PCI in the LAD and RCA with most recent diagnostic Cath @ [facility] 7/12/17 revealing patient LAD and RCA stents who presents to his doctor c/o intermittent CP which is getting worse and feels like pressure without radiation, lasting minutes and increasing with exertion accompanied with SOB. Pt endorses severely limited ET, symptoms are worsening over one month and feel similar to his pain prior to his PCI. Pt denies palpitations, LE edema, PND/orthopnea, syncope, dizziness, n/v, fever/chilld, blood in the stool. Pt's last ECHO on 04/24/18 indicates mild MR, mild concentric LVH, grossly normal LV systolic function with EF of 50-55%, grade I diastolic dysfunction  In light of pt's risk factors, symptoms mentioned above and prior history of CAD, pt is now referred to [facility] for recommended cardiac cath with possible intervention.     CATH 9/25: PTCA-->pLAD ISR, PTCA-->mLAD ISR, EF:50-55% by prior ECHO.     Overnight pt c/o 3/10 substernal, non-radiating CP which has been steadily improving since cath per pt, with no other associated symptoms per pt. Pt seen laying comfortably in bed. VSS, /66, HR 79, RR 16, O2 SpO2 98% on RA. EKG showed no acute changes.  Pt cp resolved and he has remained cp free this am.  Pt is oob ambulating and voiding, all VSS, labs reviewed and H/H, BUN/Cr stable within baseline. Pt had LE duplex performed prior to discharge and Dr Avendano to follow up results.  Pt has been cleared for discharge per Dr Avendano and will follow up with him as scheduled.  All instructions given to patient via language line  and transportation has been arranged by SW to take him home.  Pt states he has all prescriptions at home and will continue taking ASA, Brilinta, Imdur, Amlodipine, ranexa, Crestor,

## 2018-09-26 NOTE — DISCHARGE NOTE ADULT - CARE PLAN
Principal Discharge DX:	Coronary artery disease involving native coronary artery of native heart with unstable angina pectoris  Goal:	remain chest pain free and take your medications  Assessment and plan of treatment:	You have a diagnosis of coronary artery disease and  received angioplasty to your prior stent sites in the proximal and middle Left Anterior Descending coronary artery.   You MUST continue taking the daily Aspirin and Brilinta (Ticagrelor) to ensure your stent does not close. DO NOT STOP THESE MEDICATIONS FOR ANY REASON UNLESS OTHERWISE INDICATED BY YOUR CARDIOLOGIST BECAUSE THIS WILL PUT YOU AT RISK FOR A HEART ATTACK. You should refrain from strenuous activity and heavy lifting for 1 week. Please make a follow up appointment with your cardiologist Dr Jay Avendano as scheduled at [facility] within 1-2 weeks of your discharge. You have all your prescriptions at home and no new prescriptions were needed.  Secondary Diagnosis:	DM (diabetes mellitus)  Assessment and plan of treatment:	You have a diagnosis of diabetes and should continue all of your diabetic medications as prescribed.  Secondary Diagnosis:	Stage 3 chronic kidney disease  Assessment and plan of treatment:	Please follow up with your primary doctor and have your blood chemistry cehcked to monitor your kidney function.  Secondary Diagnosis:	Essential hypertension  Assessment and plan of treatment:	You have a history of elevated blood pressure and you should continue your blood pressure medications as prescribed.  Secondary Diagnosis:	Hyperlipidemia  Assessment and plan of treatment:	Please continue your daily statin to ensure your cardiac stent remains open.  Secondary Diagnosis:	Chronic diastolic (congestive) heart failure  Assessment and plan of treatment:	You have a history of heart failure. You should weigh yourself daily and if you notice a weight gain of more than 2-3 pounds in 2 days, shortness of breath, or lower extremity swelling you should contact your doctor immediately. Please restrict your fluid intake to 1-2L daily nad your salt intake to less than 2000 milliGrams daily.

## 2018-09-26 NOTE — DISCHARGE NOTE ADULT - PLAN OF CARE
remain chest pain free and take your medications You have a diagnosis of coronary artery disease and  received angioplasty to your prior stent sites in the proximal and middle Left Anterior Descending coronary artery.   You MUST continue taking the daily Aspirin and Brilinta (Ticagrelor) to ensure your stent does not close. DO NOT STOP THESE MEDICATIONS FOR ANY REASON UNLESS OTHERWISE INDICATED BY YOUR CARDIOLOGIST BECAUSE THIS WILL PUT YOU AT RISK FOR A HEART ATTACK. You should refrain from strenuous activity and heavy lifting for 1 week. Please make a follow up appointment with your cardiologist Dr Jay Avendano as scheduled at [place] office within 1-2 weeks of your discharge. You have all your prescriptions at home and no new prescriptions were needed. You have a diagnosis of diabetes and should continue all of your diabetic medications as prescribed. Please follow up with your primary doctor and have your blood chemistry cehcked to monitor your kidney function. You have a history of elevated blood pressure and you should continue your blood pressure medications as prescribed. Please continue your daily statin to ensure your cardiac stent remains open. You have a history of heart failure. You should weigh yourself daily and if you notice a weight gain of more than 2-3 pounds in 2 days, shortness of breath, or lower extremity swelling you should contact your doctor immediately. Please restrict your fluid intake to 1-2L daily nad your salt intake to less than 2000 milliGrams daily.

## 2018-09-26 NOTE — DISCHARGE NOTE ADULT - PATIENT PORTAL LINK FT
You can access the [facility] Patient Portal, offered by [facility], by registering with the following website: [email or web address]
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
## 2018-10-01 DIAGNOSIS — I50.32 CHRONIC DIASTOLIC (CONGESTIVE) HEART FAILURE: ICD-10-CM

## 2018-10-01 DIAGNOSIS — E11.51 TYPE 2 DIABETES MELLITUS WITH DIABETIC PERIPHERAL ANGIOPATHY WITHOUT GANGRENE: ICD-10-CM

## 2018-10-01 DIAGNOSIS — Z79.84 LONG TERM (CURRENT) USE OF ORAL HYPOGLYCEMIC DRUGS: ICD-10-CM

## 2018-10-01 DIAGNOSIS — E78.5 HYPERLIPIDEMIA, UNSPECIFIED: ICD-10-CM

## 2018-10-01 DIAGNOSIS — I25.119 ATHEROSCLEROTIC HEART DISEASE OF NATIVE CORONARY ARTERY WITH UNSPECIFIED ANGINA PECTORIS: ICD-10-CM

## 2018-10-01 DIAGNOSIS — T82.855A STENOSIS OF CORONARY ARTERY STENT, INITIAL ENCOUNTER: ICD-10-CM

## 2018-10-01 DIAGNOSIS — B19.10 UNSPECIFIED VIRAL HEPATITIS B WITHOUT HEPATIC COMA: ICD-10-CM

## 2018-10-01 DIAGNOSIS — E11.22 TYPE 2 DIABETES MELLITUS WITH DIABETIC CHRONIC KIDNEY DISEASE: ICD-10-CM

## 2018-10-01 DIAGNOSIS — Z88.9 ALLERGY STATUS TO UNSPECIFIED DRUGS, MEDICAMENTS AND BIOLOGICAL SUBSTANCES: ICD-10-CM

## 2018-10-01 DIAGNOSIS — K21.9 GASTRO-ESOPHAGEAL REFLUX DISEASE WITHOUT ESOPHAGITIS: ICD-10-CM

## 2018-10-01 DIAGNOSIS — N40.0 BENIGN PROSTATIC HYPERPLASIA WITHOUT LOWER URINARY TRACT SYMPTOMS: ICD-10-CM

## 2018-10-01 DIAGNOSIS — N18.3 CHRONIC KIDNEY DISEASE, STAGE 3 (MODERATE): ICD-10-CM

## 2018-10-01 DIAGNOSIS — I13.0 HYPERTENSIVE HEART AND CHRONIC KIDNEY DISEASE WITH HEART FAILURE AND STAGE 1 THROUGH STAGE 4 CHRONIC KIDNEY DISEASE, OR UNSPECIFIED CHRONIC KIDNEY DISEASE: ICD-10-CM
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
## 2018-10-03 PROCEDURE — C1769: CPT

## 2018-10-03 PROCEDURE — C1889: CPT

## 2018-10-03 PROCEDURE — 80053 COMPREHEN METABOLIC PANEL: CPT

## 2018-10-03 PROCEDURE — 85610 PROTHROMBIN TIME: CPT

## 2018-10-03 PROCEDURE — 86140 C-REACTIVE PROTEIN: CPT

## 2018-10-03 PROCEDURE — 83735 ASSAY OF MAGNESIUM: CPT

## 2018-10-03 PROCEDURE — 93454 CORONARY ARTERY ANGIO S&I: CPT

## 2018-10-03 PROCEDURE — 82553 CREATINE MB FRACTION: CPT

## 2018-10-03 PROCEDURE — C1725: CPT

## 2018-10-03 PROCEDURE — 92920 PRQ TRLUML C ANGIOP 1ART&/BR: CPT

## 2018-10-03 PROCEDURE — 36415 COLL VENOUS BLD VENIPUNCTURE: CPT

## 2018-10-03 PROCEDURE — 82550 ASSAY OF CK (CPK): CPT

## 2018-10-03 PROCEDURE — C1887: CPT

## 2018-10-03 PROCEDURE — 85025 COMPLETE CBC W/AUTO DIFF WBC: CPT

## 2018-10-03 PROCEDURE — C1894: CPT

## 2018-10-03 PROCEDURE — 82962 GLUCOSE BLOOD TEST: CPT

## 2018-10-03 PROCEDURE — 83036 HEMOGLOBIN GLYCOSYLATED A1C: CPT

## 2018-10-03 PROCEDURE — 80048 BASIC METABOLIC PNL TOTAL CA: CPT

## 2018-10-03 PROCEDURE — 85027 COMPLETE CBC AUTOMATED: CPT

## 2018-10-03 PROCEDURE — 80061 LIPID PANEL: CPT

## 2018-10-03 PROCEDURE — 93925 LOWER EXTREMITY STUDY: CPT

## 2018-10-03 PROCEDURE — 93005 ELECTROCARDIOGRAM TRACING: CPT

## 2018-10-03 PROCEDURE — 85730 THROMBOPLASTIN TIME PARTIAL: CPT

## 2018-10-04 ENCOUNTER — APPOINTMENT (OUTPATIENT)
Dept: CARDIOLOGY | Facility: CLINIC | Age: 83
End: 2018-10-04
Payer: MEDICARE

## 2018-10-04 VITALS
DIASTOLIC BLOOD PRESSURE: 55 MMHG | TEMPERATURE: 97.4 F | OXYGEN SATURATION: 98 % | RESPIRATION RATE: 17 BRPM | BODY MASS INDEX: 23.86 KG/M2 | HEART RATE: 57 BPM | SYSTOLIC BLOOD PRESSURE: 93 MMHG | WEIGHT: 139 LBS

## 2018-10-04 PROCEDURE — 99495 TRANSJ CARE MGMT MOD F2F 14D: CPT

## 2018-10-04 PROCEDURE — 93000 ELECTROCARDIOGRAM COMPLETE: CPT

## 2018-10-18 ENCOUNTER — NON-APPOINTMENT (OUTPATIENT)
Age: 83
End: 2018-10-18

## 2018-10-18 ENCOUNTER — APPOINTMENT (OUTPATIENT)
Dept: CARDIOLOGY | Facility: CLINIC | Age: 83
End: 2018-10-18
Payer: MEDICARE

## 2018-10-18 VITALS
RESPIRATION RATE: 17 BRPM | OXYGEN SATURATION: 98 % | WEIGHT: 140 LBS | SYSTOLIC BLOOD PRESSURE: 91 MMHG | DIASTOLIC BLOOD PRESSURE: 58 MMHG | TEMPERATURE: 97.5 F | BODY MASS INDEX: 24.03 KG/M2 | HEART RATE: 70 BPM

## 2018-10-18 PROCEDURE — 99214 OFFICE O/P EST MOD 30 MIN: CPT

## 2018-10-18 PROCEDURE — 93000 ELECTROCARDIOGRAM COMPLETE: CPT

## 2018-10-23 ENCOUNTER — APPOINTMENT (OUTPATIENT)
Dept: CARDIOLOGY | Facility: CLINIC | Age: 83
End: 2018-10-23
Payer: MEDICARE

## 2018-10-23 PROCEDURE — 93306 TTE W/DOPPLER COMPLETE: CPT

## 2018-10-29 ENCOUNTER — APPOINTMENT (OUTPATIENT)
Dept: CARDIOLOGY | Facility: CLINIC | Age: 83
End: 2018-10-29
Payer: MEDICARE

## 2018-10-29 ENCOUNTER — LABORATORY RESULT (OUTPATIENT)
Age: 83
End: 2018-10-29

## 2018-10-29 VITALS
RESPIRATION RATE: 17 BRPM | DIASTOLIC BLOOD PRESSURE: 57 MMHG | BODY MASS INDEX: 23.52 KG/M2 | TEMPERATURE: 97.3 F | WEIGHT: 137 LBS | HEART RATE: 63 BPM | OXYGEN SATURATION: 93 % | SYSTOLIC BLOOD PRESSURE: 105 MMHG

## 2018-10-29 DIAGNOSIS — R11.10 VOMITING, UNSPECIFIED: ICD-10-CM

## 2018-10-29 PROCEDURE — 99214 OFFICE O/P EST MOD 30 MIN: CPT

## 2018-10-29 PROCEDURE — 93000 ELECTROCARDIOGRAM COMPLETE: CPT

## 2018-10-29 RX ORDER — METOCLOPRAMIDE 5 MG/1
5 TABLET ORAL 3 TIMES DAILY
Qty: 30 | Refills: 0 | Status: ACTIVE | COMMUNITY
Start: 2018-10-29 | End: 1900-01-01

## 2018-11-08 LAB
ALBUMIN SERPL ELPH-MCNC: 4.3 G/DL
ALP BLD-CCNC: 90 U/L
ALT SERPL-CCNC: 124 U/L
ANION GAP SERPL CALC-SCNC: 14 MMOL/L
AST SERPL-CCNC: 58 U/L
BASOPHILS # BLD AUTO: 0 K/UL
BASOPHILS NFR BLD AUTO: 0 %
BILIRUB SERPL-MCNC: 0.4 MG/DL
BUN SERPL-MCNC: 62 MG/DL
CALCIUM SERPL-MCNC: 9 MG/DL
CHLORIDE SERPL-SCNC: 99 MMOL/L
CO2 SERPL-SCNC: 24 MMOL/L
CREAT SERPL-MCNC: 2.52 MG/DL
EOSINOPHIL # BLD AUTO: 0.46 K/UL
EOSINOPHIL NFR BLD AUTO: 5.2 %
GLUCOSE SERPL-MCNC: 76 MG/DL
HCT VFR BLD CALC: 39.8 %
HGB BLD-MCNC: 12.7 G/DL
LYMPHOCYTES # BLD AUTO: 1.22 K/UL
LYMPHOCYTES NFR BLD AUTO: 13.8 %
MAN DIFF?: NORMAL
MCHC RBC-ENTMCNC: 31.9 GM/DL
MCHC RBC-ENTMCNC: 33.9 PG
MCV RBC AUTO: 106.1 FL
MONOCYTES # BLD AUTO: 1.59 K/UL
MONOCYTES NFR BLD AUTO: 18.1 %
NEUTROPHILS # BLD AUTO: 5.54 K/UL
NEUTROPHILS NFR BLD AUTO: 62.9 %
PLATELET # BLD AUTO: 235 K/UL
POTASSIUM SERPL-SCNC: 5.6 MMOL/L
PROT SERPL-MCNC: 7.9 G/DL
RBC # BLD: 3.75 M/UL
RBC # FLD: 13.4 %
SODIUM SERPL-SCNC: 137 MMOL/L
WBC # FLD AUTO: 8.81 K/UL

## 2018-11-12 ENCOUNTER — APPOINTMENT (OUTPATIENT)
Dept: CARDIOLOGY | Facility: CLINIC | Age: 83
End: 2018-11-12
Payer: MEDICARE

## 2018-11-12 VITALS
HEART RATE: 65 BPM | OXYGEN SATURATION: 97 % | RESPIRATION RATE: 17 BRPM | BODY MASS INDEX: 23.52 KG/M2 | TEMPERATURE: 97.6 F | WEIGHT: 137 LBS | DIASTOLIC BLOOD PRESSURE: 58 MMHG | SYSTOLIC BLOOD PRESSURE: 95 MMHG

## 2018-11-12 PROCEDURE — 99214 OFFICE O/P EST MOD 30 MIN: CPT

## 2018-11-12 PROCEDURE — 93000 ELECTROCARDIOGRAM COMPLETE: CPT

## 2018-11-12 NOTE — DISCUSSION/SUMMARY
[FreeTextEntry1] : 83 M HTN hyperlipidemia CAD PCI CKD for f/u.\par Cr 2.5 unlikely to be from cath. Likely from dehydration due to vomiting.\par Patient looks euvolemic today, encourage PO intake and water intake.\par Continue ASA and plavix.\par F/u PMD.

## 2018-11-12 NOTE — REASON FOR VISIT
[Follow-Up - Clinic] : a clinic follow-up of [Coronary Artery Disease] : coronary artery disease [Hyperlipidemia] : hyperlipidemia [Hypertension] : hypertension [FreeTextEntry1] : 83 M HTN hyperlipidemia CKD Cr 1.9 CAD s/p PCI with vomiting. \par

## 2018-11-12 NOTE — PHYSICAL EXAM
[General Appearance - Well Developed] : well developed [Normal Appearance] : normal appearance [Well Groomed] : well groomed [General Appearance - Well Nourished] : well nourished [No Deformities] : no deformities [General Appearance - In No Acute Distress] : no acute distress [Normal Conjunctiva] : the conjunctiva exhibited no abnormalities [Eyelids - No Xanthelasma] : the eyelids demonstrated no xanthelasmas [Normal Oral Mucosa] : normal oral mucosa [No Oral Pallor] : no oral pallor [No Oral Cyanosis] : no oral cyanosis [Normal Jugular Venous A Waves Present] : normal jugular venous A waves present [Normal Jugular Venous V Waves Present] : normal jugular venous V waves present [No Jugular Venous Zabala A Waves] : no jugular venous zabala A waves [Respiration, Rhythm And Depth] : normal respiratory rhythm and effort [Exaggerated Use Of Accessory Muscles For Inspiration] : no accessory muscle use [Auscultation Breath Sounds / Voice Sounds] : lungs were clear to auscultation bilaterally [Heart Rate And Rhythm] : heart rate and rhythm were normal [Heart Sounds] : normal S1 and S2 [Murmurs] : no murmurs present [Abdomen Soft] : soft [Abdomen Tenderness] : non-tender [Abdomen Mass (___ Cm)] : no abdominal mass palpated [Nail Clubbing] : no clubbing of the fingernails [Cyanosis, Localized] : no localized cyanosis [Petechial Hemorrhages (___cm)] : no petechial hemorrhages [Skin Color & Pigmentation] : normal skin color and pigmentation [] : no rash [No Venous Stasis] : no venous stasis [Skin Lesions] : no skin lesions [No Skin Ulcers] : no skin ulcer [No Xanthoma] : no  xanthoma was observed [Oriented To Time, Place, And Person] : oriented to person, place, and time [Affect] : the affect was normal [Mood] : the mood was normal [No Anxiety] : not feeling anxious

## 2018-11-12 NOTE — HISTORY OF PRESENT ILLNESS
[FreeTextEntry1] : 1. HTN: on medications, controlled. \par 2. Hyperlipidemia: on statin. No muscle pain noted. \par 3. CAD s/p PCI: patient has had mutiple PCIs including one year ago at [facility]. \par He underwent cardiac cath at [facility] on 9/25 and he had ISR of prox and mid LAD stents. CXR last visit was normal.\par He returns with several days of worsening vomiting including one day where he had 6 or 7 episodes of non-bloody vomiting. He reports mild dizziness. His vomiting has improved today and he was able to take his medications. His Cr is 2.5 up from a baseline of 1.9\par \par

## 2018-12-03 ENCOUNTER — APPOINTMENT (OUTPATIENT)
Dept: CARDIOLOGY | Facility: CLINIC | Age: 83
End: 2018-12-03

## 2018-12-20 ENCOUNTER — APPOINTMENT (OUTPATIENT)
Dept: CARDIOLOGY | Facility: CLINIC | Age: 83
End: 2018-12-20

## 2019-01-04 ENCOUNTER — RX RENEWAL (OUTPATIENT)
Age: 84
End: 2019-01-04

## 2019-05-01 ENCOUNTER — OUTPATIENT (OUTPATIENT)
Dept: OUTPATIENT SERVICES | Facility: HOSPITAL | Age: 84
LOS: 1 days | End: 2019-05-01
Payer: MEDICARE

## 2019-05-01 PROCEDURE — G9001: CPT

## 2019-05-10 NOTE — PATIENT PROFILE ADULT. - PROVIDER NOTIFICATION
Nurses notes (i.e., Visit Notes) reviewed and agree with above documentation.  Patient has had ongoing cold symptoms over the last 1 week with nasal congestion and coughing which seemed to be getting better. Over the last several days he has been awakening with pain and pulling on his ear.  Had some harder stools and now softer well drinking juices more. Pain medication was given with some relief.    Patient attends  though mother had respiratory illnesses prior to patient.    PMH:  He has not had any recent ear infection and his last episode was winter, 2018 treated with amoxicillin.    ON EXAM:    GENERAL: alert, active, comfortable, not toxic, and in no acute distress, intermittent wet coughing heard without respiratory distress  HYDRATION:well hydrated: moist mucous membranes, good tear production & skin turgor, eyes not sunken  HEAD: NC/AT    SKIN:  normal without rashes   EYES: Sclerae and conjunctivae clear without discharge, lids normal  EARS: Left TM normal landmarks, translucent and mobile.  Nl canal. and Right TM erythematous, dull, and bulging and purulent effusion filling middle ear space  NOSE: Thick white rhinorrhea and seen after sneezing, nares patent without nasal flaring  THROAT: mmm, +1 tonsils w/o erythema, exudates or petechiae  NECK: supple, no lymphadenopathy and no thyromegaly, nodules or masses  LUNGS: Chest totally clear; no rales, rhonchi, wheezes or stridor, Excellent air exchange with normal I/E; and no tachyp or retractions    A:    1. Right acute otitis media    2. Viral URI with cough      P:   Reviewed with parent diagnosis of acute otitis media. Antibiotics as ordered. Follow-up if not improving over the next 2-3 days.  May use Tylenol or ibuprofen as needed for discomfort.  URI care to continue until symptoms resolve.     Reviewed discomfort associated with mild constipation and now more soft bowel movements. Continue to monitor bowel movements and use probiotic if needed  for diarrhea. Also patient does enjoy yogurt which she should encourage during this time. Limit juices if diarrhea develops discussed.    Return in about 3 weeks (around 5/31/2019) for recheck condition - infection.   Yes

## 2019-05-22 DIAGNOSIS — Z71.89 OTHER SPECIFIED COUNSELING: ICD-10-CM

## 2019-06-05 ENCOUNTER — APPOINTMENT (OUTPATIENT)
Dept: CARDIOLOGY | Facility: CLINIC | Age: 84
End: 2019-06-05
Payer: MEDICARE

## 2019-06-05 VITALS
DIASTOLIC BLOOD PRESSURE: 50 MMHG | HEIGHT: 60 IN | WEIGHT: 140 LBS | SYSTOLIC BLOOD PRESSURE: 98 MMHG | BODY MASS INDEX: 27.48 KG/M2 | HEART RATE: 58 BPM | RESPIRATION RATE: 2 BRPM

## 2019-06-05 PROCEDURE — 99215 OFFICE O/P EST HI 40 MIN: CPT

## 2019-06-05 NOTE — DISCUSSION/SUMMARY
[FreeTextEntry1] : 83 M HTN hyperlipidemia CAD s/p PCI with worsening CP and abnormal EKG.\par PATIENT HAS WORSENING SYMPTOMS DESPITE MEDICAL TREATMENT AND NEW EKG CHANGES.\par REFER FOR CARDIAC CATHETERIZATION.\par Continue ASA and Brilinta.\par NG prn for CP.\par Continue meds for HTN.\par Continue statin.\par Condition deteriorating, see me after tests.

## 2019-06-05 NOTE — REASON FOR VISIT
[Follow-Up - Clinic] : a clinic follow-up of [Hyperlipidemia] : hyperlipidemia [Coronary Artery Disease] : coronary artery disease [Hypertension] : hypertension [FreeTextEntry1] : 83 M HTN hyperlipidemia CKD Cr 1.9 CAD s/p PCI with CP and claudication.\par

## 2019-06-05 NOTE — PHYSICAL EXAM
[General Appearance - Well Developed] : well developed [Well Groomed] : well groomed [Normal Appearance] : normal appearance [No Deformities] : no deformities [General Appearance - Well Nourished] : well nourished [General Appearance - In No Acute Distress] : no acute distress [Normal Conjunctiva] : the conjunctiva exhibited no abnormalities [Normal Oral Mucosa] : normal oral mucosa [Eyelids - No Xanthelasma] : the eyelids demonstrated no xanthelasmas [No Oral Cyanosis] : no oral cyanosis [No Oral Pallor] : no oral pallor [Normal Jugular Venous A Waves Present] : normal jugular venous A waves present [Normal Jugular Venous V Waves Present] : normal jugular venous V waves present [No Jugular Venous Zabala A Waves] : no jugular venous zabala A waves [Respiration, Rhythm And Depth] : normal respiratory rhythm and effort [Exaggerated Use Of Accessory Muscles For Inspiration] : no accessory muscle use [Auscultation Breath Sounds / Voice Sounds] : lungs were clear to auscultation bilaterally [Heart Sounds] : normal S1 and S2 [Heart Rate And Rhythm] : heart rate and rhythm were normal [Murmurs] : no murmurs present [Abdomen Tenderness] : non-tender [Abdomen Soft] : soft [Nail Clubbing] : no clubbing of the fingernails [Abdomen Mass (___ Cm)] : no abdominal mass palpated [Cyanosis, Localized] : no localized cyanosis [Petechial Hemorrhages (___cm)] : no petechial hemorrhages [Skin Color & Pigmentation] : normal skin color and pigmentation [] : no rash [No Venous Stasis] : no venous stasis [No Xanthoma] : no  xanthoma was observed [Skin Lesions] : no skin lesions [No Skin Ulcers] : no skin ulcer [Affect] : the affect was normal [Oriented To Time, Place, And Person] : oriented to person, place, and time [No Anxiety] : not feeling anxious [Mood] : the mood was normal

## 2019-06-05 NOTE — HISTORY OF PRESENT ILLNESS
[FreeTextEntry1] : 1. HTN: on medications, no SE noted. \par 2. Hyperlipidemia: on statin. Lipid profile is followed by PMD.\par 3. CAD s/p PCI: patient has had mutiple PCIs including one year ago at [facility]. \par He underwent cardiac cath at [facility] on 9/25 and he had ISR of prox and mid LAD stents.  \par He has not seen me for several months. It is unclear if he was compliant.\par He has noted worsening CP, midline, substernal, exertional, relieved with rest and increasing in frequency and duration over the last month.\par He has his CP with one block of walking and it is relieved with NG and lasts a few minutes.He also has exertional SOB with the CP and has very limited ET due to the CP and bilateral hip and calf pain.

## 2019-06-09 VITALS — OXYGEN SATURATION: 98 % | TEMPERATURE: 97 F | HEART RATE: 52 BPM | RESPIRATION RATE: 16 BRPM

## 2019-06-09 NOTE — H&P ADULT - ASSESSMENT
82yo Divehi Speaking M, CONTRAST DYE AND CHLORHEXIDINE ALLERGIES, poor MD follow up, questionable med compliance, with PMHx of HTN, HLD, DM, CAD s/p PCI (last PTCA p/mLAD ISR 9/2018), CKD (baseline cr 1.9), chronic diastolic CHF (EF 66%), hepatitis B, and GERD who presented [facility] for recommended cardiac cath with possible intervention, in light of pt's risk factors, CCS 3 anginal symptoms and prior history of CAD.    ASA III and Mallampati III     OF NOTE: pt took daily dose of Brillinta 90 mg, will give pt ASA 81 mg PO x1. Pt verbalized that he is complaint with his meds. will hydrate pt @ 100 cc/hr.   Pt has questionable IV contrast allergy, on the last admission was given Solucortef 200 mg IV x1 and benadryl 50 mg IV x1 and tolerated procedure well; will repeat the same regimen on this admission. Discussed with Dr. Craig    The procedure was explained and the consent was taken with the help of [employer]  # [id number]     Risks & benefits of procedure and alternative therapy have been explained to the patient including but not limited to: allergic reaction, bleeding w/possible need for blood transfusion, infection, renal and vascular compromise, limb damage, arrhythmia, stroke, vessel dissection/perforation, Myocardial infarction, emergent CABG. Informed consent obtained and in chart. 82yo Yoruba Speaking M, CONTRAST DYE AND CHLORHEXIDINE ALLERGIES, poor MD follow up, questionable med compliance, with PMHx of HTN, HLD, DM, CAD s/p PCI (last PTCA p/mLAD ISR 9/2018), CKD (baseline cr 1.9), chronic diastolic CHF (EF 66%), hepatitis B, and GERD who presented [facility] for recommended cardiac cath with possible intervention, in light of pt's risk factors, CCS 3 anginal symptoms and prior history of CAD.    ASA III and Mallampati III     OF NOTE: pt took daily dose of Brillinta 90 mg, will give pt ASA 81 mg PO x1. Pt verbalized that he is complaint with his meds. will hydrate pt @ 100 cc/hr.   Pt has questionable IV contrast allergy, on the last admission was given Solucortef 200 mg IV x1 and benadryl 50 mg IV x1 and tolerated procedure well; will repeat the same regimen on this admission. Discussed with Dr. Craig . As per Dr. Craig additional 250 ml bolus of NS was given.     The procedure was explained and the consent was taken with the help of [employer]  # [id number]     Risks & benefits of procedure and alternative therapy have been explained to the patient including but not limited to: allergic reaction, bleeding w/possible need for blood transfusion, infection, renal and vascular compromise, limb damage, arrhythmia, stroke, vessel dissection/perforation, Myocardial infarction, emergent CABG. Informed consent obtained and in chart.

## 2019-06-09 NOTE — H&P ADULT - NSICDXPASTMEDICALHX_GEN_ALL_CORE_FT
PAST MEDICAL HISTORY:  CAD (coronary artery disease)     CHF (congestive heart failure)     CKD (chronic kidney disease)     Diabetes mellitus     DM (diabetes mellitus)     GERD (gastroesophageal reflux disease)     HLD (hyperlipidemia)     HTN (hypertension)     HTN (hypertension)

## 2019-06-09 NOTE — H&P ADULT - ATTENDING COMMENTS
PLease see housestaff note for full details. I have reviewed the case, examined the patient and agree with plan.   83 M HTN hyperlipidemia CKD Cr baseline 1.9 CAD mult PCI with worsening CP and abnormal EKG.  Patient underwent cardiac cath today which showed ISR of the LAD. Patient underwent atherectomy.   He had been non compliant with appts and medications in the past.   I have stressed the need for compliant with medical therapy. Patient will also need to come back in 2-4 weeks for brachytherapy.

## 2019-06-09 NOTE — H&P ADULT - HISTORY OF PRESENT ILLNESS
SKELETON  CONTRAST DYE AND CHLORHEXIDINE ALLERGIES - UNCLEAR WHICH CAUSED PRIOR REACTION OF FACE/CHEST RASH DURING PRIOR PROCEDURE AFTER PREPPING PER PRIOR DOCUMENTATION    82yo German Speaking M, CONTRAST DYE AND CHLORHEXIDINE ALLERGIES, poor MD follow up, questionable med compliance, with PMHx of HTN, HLD, DM, CAD s/p PCI (last PTCA p/mLAD ISR 9/2018), CKD (baseline cr 1.9), chronic diastolic CHF (EF 66%), hepatitis B, and GERD who presented to Dr. Avendano's office complaining of worsening SSCP and SOB on exertion of walking one block, relieved with rest and SL NTG, x 1 month. Patient denies palpitations, PND, orthopnea, N/V, hematochezia, melena, LE edema, dizziness, syncope. Echo 04/24/18 indicates mild MR, mild concentric LVH, grossly normal LV systolic function with EF of 50-55%, grade I diastolic dysfunction. EKG 6/5/19 revealed SR with TWI in V1-V3 per MD note.    In light of patient's risk factors and class III anginal symptoms, patient is now referred to [facility] for recommended cardiac catheterization with possible intervention. CONTRAST DYE AND CHLORHEXIDINE ALLERGIES - UNCLEAR WHICH CAUSED PRIOR REACTION OF FACE/CHEST RASH DURING PRIOR PROCEDURE AFTER PREPPING PER PRIOR DOCUMENTATION--HAS BEEN PREMEDICATION     84yo Croatian Speaking M, CONTRAST DYE AND CHLORHEXIDINE ALLERGIES, poor MD follow up, questionable med compliance, with PMHx of HTN, HLD, DM, CAD s/p PCI (last PTCA p/mLAD ISR 9/2018), CKD (baseline cr 1.9), chronic diastolic CHF (EF 66%), hepatitis B, and GERD who presented to Dr. Avendano's office complaining of worsening SSCP and SOB on exertion of walking one block, relieved with rest and SL NTG, x 1 month. Patient denies palpitations, PND, orthopnea, N/V, hematochezia, melena, LE edema, dizziness, syncope. Echo 04/24/18 indicates mild MR, mild concentric LVH, grossly normal LV systolic function with EF of 50-55%, grade I diastolic dysfunction. EKG 6/5/19 revealed SR with NEW TWI in V1-V3 per MD note. Upon further questioning, patient admits to missing occasional dose of Brilinta and Aspirin.     In light of patient's risk factors and CCS Angina Class III symptoms, patient is now referred to [facility] for recommended cardiac catheterization with possible intervention. CONTRAST DYE AND CHLORHEXIDINE ALLERGIES - UNCLEAR WHICH CAUSED PRIOR REACTION OF FACE/CHEST RASH DURING PRIOR PROCEDURE AFTER PREPPING PER PRIOR DOCUMENTATION    82yo Greenlandic Speaking M, CONTRAST DYE AND CHLORHEXIDINE ALLERGIES, poor MD follow up, questionable med compliance, with PMHx of HTN, HLD, DM, CAD s/p PCI (last PTCA p/mLAD ISR 9/2018), CKD (baseline cr 1.9), chronic diastolic CHF (EF 66%), hepatitis B, and GERD who presented to Dr. Avendano's office complaining of worsening SSCP and SOB on exertion of walking one block, relieved with rest and SL NTG, x 1 month. Patient denies palpitations, PND, orthopnea, N/V, hematochezia, melena, LE edema, dizziness, syncope. Echo 04/24/18 indicates mild MR, mild concentric LVH, grossly normal LV systolic function with EF of 50-55%, grade I diastolic dysfunction. EKG 6/5/19 revealed SR with NEW TWI in V1-V3 per MD note. Upon further questioning, patient admits to missing occasional dose of Brilinta and Aspirin.     In light of patient's risk factors and CCS Angina Class III symptoms, patient is now referred to [facility] for recommended cardiac catheterization with possible intervention.

## 2019-06-11 ENCOUNTER — INPATIENT (INPATIENT)
Facility: HOSPITAL | Age: 84
LOS: 0 days | Discharge: HOME | DRG: 251 | End: 2019-06-12
Attending: INTERNAL MEDICINE | Admitting: INTERNAL MEDICINE
Payer: COMMERCIAL

## 2019-06-11 LAB
ALBUMIN SERPL ELPH-MCNC: 4.3 G/DL — SIGNIFICANT CHANGE UP (ref 3.3–5)
ALP SERPL-CCNC: 77 U/L — SIGNIFICANT CHANGE UP (ref 40–120)
ALT FLD-CCNC: 13 U/L — SIGNIFICANT CHANGE UP (ref 10–45)
ANION GAP SERPL CALC-SCNC: 13 MMOL/L — SIGNIFICANT CHANGE UP (ref 5–17)
APTT BLD: 30.5 SEC — SIGNIFICANT CHANGE UP (ref 27.5–36.3)
AST SERPL-CCNC: 18 U/L — SIGNIFICANT CHANGE UP (ref 10–40)
BASOPHILS # BLD AUTO: 0 K/UL — SIGNIFICANT CHANGE UP (ref 0–0.2)
BASOPHILS NFR BLD AUTO: 0 % — SIGNIFICANT CHANGE UP (ref 0–2)
BILIRUB SERPL-MCNC: 0.7 MG/DL — SIGNIFICANT CHANGE UP (ref 0.2–1.2)
BUN SERPL-MCNC: 40 MG/DL — HIGH (ref 7–23)
CALCIUM SERPL-MCNC: 9.1 MG/DL — SIGNIFICANT CHANGE UP (ref 8.4–10.5)
CHLORIDE SERPL-SCNC: 99 MMOL/L — SIGNIFICANT CHANGE UP (ref 96–108)
CHOLEST SERPL-MCNC: 131 MG/DL — SIGNIFICANT CHANGE UP (ref 10–199)
CK MB CFR SERPL CALC: 4.8 NG/ML — SIGNIFICANT CHANGE UP (ref 0–6.7)
CK SERPL-CCNC: 191 U/L — SIGNIFICANT CHANGE UP (ref 30–200)
CO2 SERPL-SCNC: 23 MMOL/L — SIGNIFICANT CHANGE UP (ref 22–31)
CREAT SERPL-MCNC: 2.48 MG/DL — HIGH (ref 0.5–1.3)
EOSINOPHIL # BLD AUTO: 0.32 K/UL — SIGNIFICANT CHANGE UP (ref 0–0.5)
EOSINOPHIL NFR BLD AUTO: 4.3 % — SIGNIFICANT CHANGE UP (ref 0–6)
GLUCOSE SERPL-MCNC: 122 MG/DL — HIGH (ref 70–99)
HBA1C BLD-MCNC: 5.4 % — SIGNIFICANT CHANGE UP (ref 4–5.6)
HCT VFR BLD CALC: 37 % — LOW (ref 39–50)
HDLC SERPL-MCNC: 50 MG/DL — SIGNIFICANT CHANGE UP
HGB BLD-MCNC: 11.9 G/DL — LOW (ref 13–17)
INR BLD: 1.03 — SIGNIFICANT CHANGE UP (ref 0.88–1.16)
LIPID PNL WITH DIRECT LDL SERPL: 51 MG/DL — SIGNIFICANT CHANGE UP
LYMPHOCYTES # BLD AUTO: 0.84 K/UL — LOW (ref 1–3.3)
LYMPHOCYTES # BLD AUTO: 11.3 % — LOW (ref 13–44)
MCHC RBC-ENTMCNC: 32.2 GM/DL — SIGNIFICANT CHANGE UP (ref 32–36)
MCHC RBC-ENTMCNC: 34.6 PG — HIGH (ref 27–34)
MCV RBC AUTO: 107.6 FL — HIGH (ref 80–100)
MONOCYTES # BLD AUTO: 1.17 K/UL — HIGH (ref 0–0.9)
MONOCYTES NFR BLD AUTO: 15.7 % — HIGH (ref 2–14)
NEUTROPHILS # BLD AUTO: 5.1 K/UL — SIGNIFICANT CHANGE UP (ref 1.8–7.4)
NEUTROPHILS NFR BLD AUTO: 68.7 % — SIGNIFICANT CHANGE UP (ref 43–77)
PLATELET # BLD AUTO: 217 K/UL — SIGNIFICANT CHANGE UP (ref 150–400)
POTASSIUM SERPL-MCNC: 5.3 MMOL/L — SIGNIFICANT CHANGE UP (ref 3.5–5.3)
POTASSIUM SERPL-SCNC: 5.3 MMOL/L — SIGNIFICANT CHANGE UP (ref 3.5–5.3)
PROT SERPL-MCNC: 8.3 G/DL — SIGNIFICANT CHANGE UP (ref 6–8.3)
PROTHROM AB SERPL-ACNC: 11.6 SEC — SIGNIFICANT CHANGE UP (ref 10–12.9)
RBC # BLD: 3.44 M/UL — LOW (ref 4.2–5.8)
RBC # FLD: 12.6 % — SIGNIFICANT CHANGE UP (ref 10.3–14.5)
SODIUM SERPL-SCNC: 135 MMOL/L — SIGNIFICANT CHANGE UP (ref 135–145)
TOTAL CHOLESTEROL/HDL RATIO MEASUREMENT: 2.6 RATIO — LOW (ref 3.4–9.6)
TRIGL SERPL-MCNC: 148 MG/DL — SIGNIFICANT CHANGE UP (ref 10–149)
WBC # BLD: 7.43 K/UL — SIGNIFICANT CHANGE UP (ref 3.8–10.5)
WBC # FLD AUTO: 7.43 K/UL — SIGNIFICANT CHANGE UP (ref 3.8–10.5)

## 2019-06-11 PROCEDURE — 93010 ELECTROCARDIOGRAM REPORT: CPT | Mod: 77

## 2019-06-11 PROCEDURE — 93454 CORONARY ARTERY ANGIO S&I: CPT | Mod: 26,XU

## 2019-06-11 PROCEDURE — 99222 1ST HOSP IP/OBS MODERATE 55: CPT

## 2019-06-11 PROCEDURE — 92920 PRQ TRLUML C ANGIOP 1ART&/BR: CPT | Mod: LD

## 2019-06-11 PROCEDURE — 93010 ELECTROCARDIOGRAM REPORT: CPT

## 2019-06-11 RX ORDER — TICAGRELOR 90 MG/1
90 TABLET ORAL
Refills: 0 | Status: DISCONTINUED | OUTPATIENT
Start: 2019-06-11 | End: 2019-06-12

## 2019-06-11 RX ORDER — DEXTROSE 50 % IN WATER 50 %
15 SYRINGE (ML) INTRAVENOUS ONCE
Refills: 0 | Status: DISCONTINUED | OUTPATIENT
Start: 2019-06-11 | End: 2019-06-11

## 2019-06-11 RX ORDER — HYDROCORTISONE 20 MG
200 TABLET ORAL ONCE
Refills: 0 | Status: COMPLETED | OUTPATIENT
Start: 2019-06-11 | End: 2019-06-11

## 2019-06-11 RX ORDER — DEXTROSE 50 % IN WATER 50 %
25 SYRINGE (ML) INTRAVENOUS ONCE
Refills: 0 | Status: DISCONTINUED | OUTPATIENT
Start: 2019-06-11 | End: 2019-06-11

## 2019-06-11 RX ORDER — DEXTROSE 50 % IN WATER 50 %
12.5 SYRINGE (ML) INTRAVENOUS ONCE
Refills: 0 | Status: DISCONTINUED | OUTPATIENT
Start: 2019-06-11 | End: 2019-06-11

## 2019-06-11 RX ORDER — GLUCAGON INJECTION, SOLUTION 0.5 MG/.1ML
1 INJECTION, SOLUTION SUBCUTANEOUS ONCE
Refills: 0 | Status: DISCONTINUED | OUTPATIENT
Start: 2019-06-11 | End: 2019-06-11

## 2019-06-11 RX ORDER — RANOLAZINE 500 MG/1
1000 TABLET, FILM COATED, EXTENDED RELEASE ORAL
Refills: 0 | Status: DISCONTINUED | OUTPATIENT
Start: 2019-06-11 | End: 2019-06-12

## 2019-06-11 RX ORDER — GLUCAGON INJECTION, SOLUTION 0.5 MG/.1ML
1 INJECTION, SOLUTION SUBCUTANEOUS ONCE
Refills: 0 | Status: DISCONTINUED | OUTPATIENT
Start: 2019-06-11 | End: 2019-06-12

## 2019-06-11 RX ORDER — DEXTROSE 50 % IN WATER 50 %
15 SYRINGE (ML) INTRAVENOUS ONCE
Refills: 0 | Status: DISCONTINUED | OUTPATIENT
Start: 2019-06-11 | End: 2019-06-12

## 2019-06-11 RX ORDER — INSULIN LISPRO 100/ML
VIAL (ML) SUBCUTANEOUS
Refills: 0 | Status: DISCONTINUED | OUTPATIENT
Start: 2019-06-11 | End: 2019-06-12

## 2019-06-11 RX ORDER — ATORVASTATIN CALCIUM 80 MG/1
40 TABLET, FILM COATED ORAL AT BEDTIME
Refills: 0 | Status: DISCONTINUED | OUTPATIENT
Start: 2019-06-11 | End: 2019-06-12

## 2019-06-11 RX ORDER — DIPHENHYDRAMINE HCL 50 MG
50 CAPSULE ORAL ONCE
Refills: 0 | Status: COMPLETED | OUTPATIENT
Start: 2019-06-11 | End: 2019-06-11

## 2019-06-11 RX ORDER — TAMSULOSIN HYDROCHLORIDE 0.4 MG/1
0.4 CAPSULE ORAL AT BEDTIME
Refills: 0 | Status: DISCONTINUED | OUTPATIENT
Start: 2019-06-11 | End: 2019-06-12

## 2019-06-11 RX ORDER — FENTANYL CITRATE 50 UG/ML
25 INJECTION INTRAVENOUS ONCE
Refills: 0 | Status: DISCONTINUED | OUTPATIENT
Start: 2019-06-11 | End: 2019-06-11

## 2019-06-11 RX ORDER — GLIMEPIRIDE 1 MG
1 TABLET ORAL
Qty: 0 | Refills: 0 | DISCHARGE

## 2019-06-11 RX ORDER — PANTOPRAZOLE SODIUM 20 MG/1
40 TABLET, DELAYED RELEASE ORAL
Refills: 0 | Status: DISCONTINUED | OUTPATIENT
Start: 2019-06-11 | End: 2019-06-12

## 2019-06-11 RX ORDER — SODIUM CHLORIDE 9 MG/ML
1000 INJECTION, SOLUTION INTRAVENOUS
Refills: 0 | Status: DISCONTINUED | OUTPATIENT
Start: 2019-06-11 | End: 2019-06-11

## 2019-06-11 RX ORDER — SODIUM CHLORIDE 9 MG/ML
250 INJECTION INTRAMUSCULAR; INTRAVENOUS; SUBCUTANEOUS ONCE
Refills: 0 | Status: COMPLETED | OUTPATIENT
Start: 2019-06-11 | End: 2019-06-11

## 2019-06-11 RX ORDER — DEXTROSE 50 % IN WATER 50 %
25 SYRINGE (ML) INTRAVENOUS ONCE
Refills: 0 | Status: DISCONTINUED | OUTPATIENT
Start: 2019-06-11 | End: 2019-06-12

## 2019-06-11 RX ORDER — SODIUM CHLORIDE 9 MG/ML
1000 INJECTION, SOLUTION INTRAVENOUS
Refills: 0 | Status: DISCONTINUED | OUTPATIENT
Start: 2019-06-11 | End: 2019-06-12

## 2019-06-11 RX ORDER — DEXTROSE 50 % IN WATER 50 %
12.5 SYRINGE (ML) INTRAVENOUS ONCE
Refills: 0 | Status: DISCONTINUED | OUTPATIENT
Start: 2019-06-11 | End: 2019-06-12

## 2019-06-11 RX ORDER — FOLIC ACID 0.8 MG
1 TABLET ORAL DAILY
Refills: 0 | Status: DISCONTINUED | OUTPATIENT
Start: 2019-06-11 | End: 2019-06-12

## 2019-06-11 RX ORDER — INSULIN LISPRO 100/ML
VIAL (ML) SUBCUTANEOUS ONCE
Refills: 0 | Status: COMPLETED | OUTPATIENT
Start: 2019-06-11 | End: 2019-06-11

## 2019-06-11 RX ORDER — METOPROLOL TARTRATE 50 MG
50 TABLET ORAL
Refills: 0 | Status: DISCONTINUED | OUTPATIENT
Start: 2019-06-11 | End: 2019-06-12

## 2019-06-11 RX ORDER — LISINOPRIL 2.5 MG/1
2.5 TABLET ORAL DAILY
Refills: 0 | Status: DISCONTINUED | OUTPATIENT
Start: 2019-06-12 | End: 2019-06-12

## 2019-06-11 RX ORDER — AMLODIPINE BESYLATE 2.5 MG/1
10 TABLET ORAL DAILY
Refills: 0 | Status: DISCONTINUED | OUTPATIENT
Start: 2019-06-11 | End: 2019-06-12

## 2019-06-11 RX ORDER — ASPIRIN/CALCIUM CARB/MAGNESIUM 324 MG
81 TABLET ORAL DAILY
Refills: 0 | Status: DISCONTINUED | OUTPATIENT
Start: 2019-06-11 | End: 2019-06-12

## 2019-06-11 RX ORDER — FUROSEMIDE 40 MG
40 TABLET ORAL DAILY
Refills: 0 | Status: DISCONTINUED | OUTPATIENT
Start: 2019-06-11 | End: 2019-06-12

## 2019-06-11 RX ORDER — SODIUM CHLORIDE 9 MG/ML
500 INJECTION INTRAMUSCULAR; INTRAVENOUS; SUBCUTANEOUS
Refills: 0 | Status: COMPLETED | OUTPATIENT
Start: 2019-06-11 | End: 2019-06-11

## 2019-06-11 RX ORDER — MORPHINE SULFATE 50 MG/1
2 CAPSULE, EXTENDED RELEASE ORAL ONCE
Refills: 0 | Status: DISCONTINUED | OUTPATIENT
Start: 2019-06-11 | End: 2019-06-11

## 2019-06-11 RX ORDER — ASPIRIN/CALCIUM CARB/MAGNESIUM 324 MG
81 TABLET ORAL ONCE
Refills: 0 | Status: COMPLETED | OUTPATIENT
Start: 2019-06-11 | End: 2019-06-11

## 2019-06-11 RX ORDER — ISOSORBIDE MONONITRATE 60 MG/1
1 TABLET, EXTENDED RELEASE ORAL
Qty: 0 | Refills: 0 | DISCHARGE

## 2019-06-11 RX ADMIN — ATORVASTATIN CALCIUM 40 MILLIGRAM(S): 80 TABLET, FILM COATED ORAL at 21:46

## 2019-06-11 RX ADMIN — SODIUM CHLORIDE 100 MILLILITER(S): 9 INJECTION INTRAMUSCULAR; INTRAVENOUS; SUBCUTANEOUS at 17:17

## 2019-06-11 RX ADMIN — Medication 81 MILLIGRAM(S): at 12:58

## 2019-06-11 RX ADMIN — Medication 0: at 13:08

## 2019-06-11 RX ADMIN — FENTANYL CITRATE 25 MICROGRAM(S): 50 INJECTION INTRAVENOUS at 17:16

## 2019-06-11 RX ADMIN — FENTANYL CITRATE 25 MICROGRAM(S): 50 INJECTION INTRAVENOUS at 18:32

## 2019-06-11 RX ADMIN — SODIUM CHLORIDE 500 MILLILITER(S): 9 INJECTION INTRAMUSCULAR; INTRAVENOUS; SUBCUTANEOUS at 14:17

## 2019-06-11 RX ADMIN — TICAGRELOR 90 MILLIGRAM(S): 90 TABLET ORAL at 21:45

## 2019-06-11 RX ADMIN — SODIUM CHLORIDE 100 MILLILITER(S): 9 INJECTION INTRAMUSCULAR; INTRAVENOUS; SUBCUTANEOUS at 12:58

## 2019-06-11 RX ADMIN — Medication 200 MILLIGRAM(S): at 13:13

## 2019-06-11 RX ADMIN — TAMSULOSIN HYDROCHLORIDE 0.4 MILLIGRAM(S): 0.4 CAPSULE ORAL at 21:46

## 2019-06-11 RX ADMIN — Medication 50 MILLIGRAM(S): at 13:13

## 2019-06-11 RX ADMIN — RANOLAZINE 1000 MILLIGRAM(S): 500 TABLET, FILM COATED, EXTENDED RELEASE ORAL at 21:46

## 2019-06-11 RX ADMIN — MORPHINE SULFATE 2 MILLIGRAM(S): 50 CAPSULE, EXTENDED RELEASE ORAL at 21:45

## 2019-06-11 RX ADMIN — Medication 2: at 21:45

## 2019-06-11 RX ADMIN — MORPHINE SULFATE 2 MILLIGRAM(S): 50 CAPSULE, EXTENDED RELEASE ORAL at 22:45

## 2019-06-11 RX ADMIN — Medication 50 MILLIGRAM(S): at 21:50

## 2019-06-12 ENCOUNTER — TRANSCRIPTION ENCOUNTER (OUTPATIENT)
Age: 84
End: 2019-06-12

## 2019-06-12 VITALS — TEMPERATURE: 97 F

## 2019-06-12 LAB
ALBUMIN SERPL ELPH-MCNC: 4.1 G/DL — SIGNIFICANT CHANGE UP (ref 3.3–5)
ALP SERPL-CCNC: 73 U/L — SIGNIFICANT CHANGE UP (ref 40–120)
ALT FLD-CCNC: 13 U/L — SIGNIFICANT CHANGE UP (ref 10–45)
ANION GAP SERPL CALC-SCNC: 12 MMOL/L — SIGNIFICANT CHANGE UP (ref 5–17)
AST SERPL-CCNC: 16 U/L — SIGNIFICANT CHANGE UP (ref 10–40)
BILIRUB SERPL-MCNC: 0.4 MG/DL — SIGNIFICANT CHANGE UP (ref 0.2–1.2)
BUN SERPL-MCNC: 44 MG/DL — HIGH (ref 7–23)
CALCIUM SERPL-MCNC: 8.8 MG/DL — SIGNIFICANT CHANGE UP (ref 8.4–10.5)
CHLORIDE SERPL-SCNC: 102 MMOL/L — SIGNIFICANT CHANGE UP (ref 96–108)
CO2 SERPL-SCNC: 22 MMOL/L — SIGNIFICANT CHANGE UP (ref 22–31)
CREAT SERPL-MCNC: 2.23 MG/DL — HIGH (ref 0.5–1.3)
GLUCOSE SERPL-MCNC: 117 MG/DL — HIGH (ref 70–99)
HCT VFR BLD CALC: 32.9 % — LOW (ref 39–50)
HGB BLD-MCNC: 10.8 G/DL — LOW (ref 13–17)
MAGNESIUM SERPL-MCNC: 2.4 MG/DL — SIGNIFICANT CHANGE UP (ref 1.6–2.6)
MCHC RBC-ENTMCNC: 32.8 GM/DL — SIGNIFICANT CHANGE UP (ref 32–36)
MCHC RBC-ENTMCNC: 35.1 PG — HIGH (ref 27–34)
MCV RBC AUTO: 106.8 FL — HIGH (ref 80–100)
NRBC # BLD: 0 /100 WBCS — SIGNIFICANT CHANGE UP (ref 0–0)
PLATELET # BLD AUTO: 196 K/UL — SIGNIFICANT CHANGE UP (ref 150–400)
POTASSIUM SERPL-MCNC: 4.6 MMOL/L — SIGNIFICANT CHANGE UP (ref 3.5–5.3)
POTASSIUM SERPL-SCNC: 4.6 MMOL/L — SIGNIFICANT CHANGE UP (ref 3.5–5.3)
PROT SERPL-MCNC: 7.5 G/DL — SIGNIFICANT CHANGE UP (ref 6–8.3)
RBC # BLD: 3.08 M/UL — LOW (ref 4.2–5.8)
RBC # FLD: 12.6 % — SIGNIFICANT CHANGE UP (ref 10.3–14.5)
SODIUM SERPL-SCNC: 136 MMOL/L — SIGNIFICANT CHANGE UP (ref 135–145)
WBC # BLD: 8.21 K/UL — SIGNIFICANT CHANGE UP (ref 3.8–10.5)
WBC # FLD AUTO: 8.21 K/UL — SIGNIFICANT CHANGE UP (ref 3.8–10.5)

## 2019-06-12 PROCEDURE — 99239 HOSP IP/OBS DSCHRG MGMT >30: CPT

## 2019-06-12 PROCEDURE — 93925 LOWER EXTREMITY STUDY: CPT | Mod: 26

## 2019-06-12 PROCEDURE — 93010 ELECTROCARDIOGRAM REPORT: CPT

## 2019-06-12 RX ORDER — TICAGRELOR 90 MG/1
1 TABLET ORAL
Qty: 0 | Refills: 0 | DISCHARGE

## 2019-06-12 RX ORDER — ACETAMINOPHEN 500 MG
650 TABLET ORAL ONCE
Refills: 0 | Status: COMPLETED | OUTPATIENT
Start: 2019-06-12 | End: 2019-06-12

## 2019-06-12 RX ORDER — METOCLOPRAMIDE HCL 10 MG
10 TABLET ORAL ONCE
Refills: 0 | Status: DISCONTINUED | OUTPATIENT
Start: 2019-06-12 | End: 2019-06-12

## 2019-06-12 RX ORDER — METOCLOPRAMIDE HCL 10 MG
10 TABLET ORAL ONCE
Refills: 0 | Status: COMPLETED | OUTPATIENT
Start: 2019-06-12 | End: 2019-06-12

## 2019-06-12 RX ORDER — ONDANSETRON 8 MG/1
4 TABLET, FILM COATED ORAL ONCE
Refills: 0 | Status: DISCONTINUED | OUTPATIENT
Start: 2019-06-12 | End: 2019-06-12

## 2019-06-12 RX ORDER — ASPIRIN/CALCIUM CARB/MAGNESIUM 324 MG
1 TABLET ORAL
Qty: 30 | Refills: 11
Start: 2019-06-12 | End: 2020-06-05

## 2019-06-12 RX ORDER — TICAGRELOR 90 MG/1
1 TABLET ORAL
Qty: 60 | Refills: 11
Start: 2019-06-12 | End: 2020-06-05

## 2019-06-12 RX ADMIN — Medication 10 MILLIGRAM(S): at 17:07

## 2019-06-12 RX ADMIN — Medication 81 MILLIGRAM(S): at 10:24

## 2019-06-12 RX ADMIN — Medication 1: at 11:35

## 2019-06-12 RX ADMIN — Medication 650 MILLIGRAM(S): at 17:00

## 2019-06-12 RX ADMIN — Medication 1 MILLIGRAM(S): at 10:24

## 2019-06-12 RX ADMIN — Medication 40 MILLIGRAM(S): at 06:02

## 2019-06-12 RX ADMIN — AMLODIPINE BESYLATE 10 MILLIGRAM(S): 2.5 TABLET ORAL at 06:02

## 2019-06-12 RX ADMIN — Medication 650 MILLIGRAM(S): at 16:18

## 2019-06-12 RX ADMIN — RANOLAZINE 1000 MILLIGRAM(S): 500 TABLET, FILM COATED, EXTENDED RELEASE ORAL at 10:24

## 2019-06-12 RX ADMIN — PANTOPRAZOLE SODIUM 40 MILLIGRAM(S): 20 TABLET, DELAYED RELEASE ORAL at 06:02

## 2019-06-12 RX ADMIN — TICAGRELOR 90 MILLIGRAM(S): 90 TABLET ORAL at 10:24

## 2019-06-12 NOTE — DISCHARGE NOTE PROVIDER - CARE PROVIDER_API CALL
Jay Avendano)  Cardiovascular Disease; Internal Medicine  [address] [place] [address]  Phone: [phone]  Fax: [phone]  Follow Up Time:

## 2019-06-12 NOTE — DISCHARGE NOTE PROVIDER - NSDCCPCAREPLAN_GEN_ALL_CORE_FT
PRINCIPAL DISCHARGE DIAGNOSIS  Diagnosis: CAD (coronary artery disease)  Assessment and Plan of Treatment: You underwent a cardiac angiogram and received a balloon to the left anterior descending artery. You are recommended to return for laser and brachytherapy to the left anterior descending artery in 5 weeks. Please discuss this with Dr. Avendano. PLEASE CONTINUE ASPIRIN 81MG DAILY AND BRILINTA 90MG TWICE DAILY. DO NOT STOP THESE MEDICATIONS FOR ANY REASON AS THEY ARE KEEPING YOUR STENT OPEN AND PREVENTING A HEART ATTACK. Avoid strenuous activity or heavy lifting for the next five days. Do not take a bath or swim for the next five days; you may shower. For any bleeding or hematoma formation (hardened blood collection under the skin) at the access site of right wrist please hold pressure and go to the emergency room. Please follow up with Dr. Avendano in 1-2 weeks. For recurrent chest pain, please call your doctor or go to the emergency room.      SECONDARY DISCHARGE DIAGNOSES  Diagnosis: Chronic diastolic CHF (congestive heart failure)  Assessment and Plan of Treatment: You have a weak heart or heart failure. Continue medications exactly as listed. Avoid drinking more than 1.5L of fluid daily. Maintain a low salt diet and weigh yourself daily. For any significant increases in daily weight with associated swelling in the legs or abdomen with shortness of breath, please call your doctor or go to the emergency room. Follow up with Dr. Avendano.    Diagnosis: DM (diabetes mellitus)  Assessment and Plan of Treatment: Please continue medications as listed for diabetes. Maintain a low carbohydrate, low sugar diet. Check for your blood sugars regularly.    Diagnosis: HLD (hyperlipidemia)  Assessment and Plan of Treatment: Please continue atorvastatin (lipitor) to keep your cholesterol low. High cholesterol contributes to heart disease.    Diagnosis: HTN (hypertension)  Assessment and Plan of Treatment: Please continue medications as listed to keep your blood pressure controlled. For blood pressure that is too high or too low please see your doctor or go to the emergency room as necessary.

## 2019-06-12 NOTE — DISCHARGE NOTE NURSING/CASE MANAGEMENT/SOCIAL WORK - NSDCDPATPORTLINK_GEN_ALL_CORE
You can access the [facility], offered by [facility], by registering with the following website: [email or web address]

## 2019-06-12 NOTE — DISCHARGE NOTE PROVIDER - HOSPITAL COURSE
84yo Vietnamese Speaking M, CONTRAST DYE AND CHLORHEXIDINE ALLERGIES, poor MD follow up, questionable med compliance, with PMHx of HTN, HLD, DM, CAD s/p PCI (last PTCA p/mLAD ISR 9/2018), CKD (baseline cr 1.9), chronic diastolic CHF (EF 66%), hepatitis B, and GERD who presented to Dr. Avendano's office complaining of worsening SSCP and SOB on exertion of walking one block, relieved with rest and SL NTG, x 1 month. Patient denies palpitations, PND, orthopnea, N/V, hematochezia, melena, LE edema, dizziness, syncope. Echo 04/24/18 indicates mild MR, mild concentric LVH, grossly normal LV systolic function with EF of 50-55%, grade I diastolic dysfunction. EKG 6/5/19 revealed SR with NEW TWI in V1-V3 per MD note. Upon further questioning, patient admits to missing occasional dose of Brilinta and Aspirin. In light of patient's risk factors and CCS Angina Class III symptoms, patient is was referred to [facility] for recommended cardiac catheterization with possible intervention. Patient underwent cardiac cath on 6/11/19 and received PTCA proxLAD ISR, residual proxRCA 30-50% ISR, right radial access. Patient to return for laser and brachytherapy of LAD in 5 weeks. Patient underwent b/l LE arterial duplex prior to d/c to eval for PAD given symptoms of claudication per Dr. Avendano which revealed...        Patient was seen and examined this AM and was asymptomatic without complaints. VSS, labs and tele reviewed. Patient is stable for discharge per Dr. Huggins. He has been given appropriate discharge instructions including medication regimen, access site management and follow up. Medications e-prescribed to preferred pharmacy.        Temp 98F, HR 56bpm, /56, RR 16, O2 sat 97% RA    Gen: NAD, A&O x 3    Cards: RRR, clear S1 and S2 without murmur    Pulm: CTA b/l without w/r/r    Right wrist: no hematoma or ooze, radial pulse 2+, good cap refill    Abd: BS x 4, soft, NT    Ext: No LE edema or ulcerations b/l 84yo Irish Speaking M, CONTRAST DYE AND CHLORHEXIDINE ALLERGIES, poor MD follow up, questionable med compliance, with PMHx of HTN, HLD, DM, CAD s/p PCI (last PTCA p/mLAD ISR 9/2018), CKD (baseline cr 1.9), chronic diastolic CHF (EF 66%), hepatitis B, and GERD who presented to Dr. Avendano's office complaining of worsening SSCP and SOB on exertion of walking one block, relieved with rest and SL NTG, x 1 month. Patient denies palpitations, PND, orthopnea, N/V, hematochezia, melena, LE edema, dizziness, syncope. Echo 04/24/18 indicates mild MR, mild concentric LVH, grossly normal LV systolic function with EF of 50-55%, grade I diastolic dysfunction. EKG 6/5/19 revealed SR with NEW TWI in V1-V3 per MD note. Upon further questioning, patient admits to missing occasional dose of Brilinta and Aspirin. In light of patient's risk factors and CCS Angina Class III symptoms, patient is was referred to [facility] for recommended cardiac catheterization with possible intervention. Patient underwent cardiac cath on 6/11/19 and received PTCA proxLAD ISR, residual proxRCA 30-50% ISR, right radial access. Patient to return for laser and brachytherapy of LAD in 5 weeks. Patient underwent b/l LE arterial duplex prior to d/c to eval for PAD given symptoms of claudication per Dr. Avendano. Patient was seen and examined this AM and was asymptomatic without complaints. VSS, labs and tele reviewed. Patient is stable for discharge per Dr. Huggins. He has been given appropriate discharge instructions including medication regimen, access site management and follow up. Medications e-prescribed to preferred pharmacy.        Temp 98F, HR 56bpm, /56, RR 16, O2 sat 97% RA    Gen: NAD, A&O x 3    Cards: RRR, clear S1 and S2 without murmur    Pulm: CTA b/l without w/r/r    Right wrist: no hematoma or ooze, radial pulse 2+, good cap refill    Abd: BS x 4, soft, NT    Ext: No LE edema or ulcerations b/l 84yo [place] Speaking M, CONTRAST DYE AND CHLORHEXIDINE ALLERGIES, poor MD follow up, questionable med compliance, with PMHx of HTN, HLD, DM, CAD s/p PCI (last PTCA p/mLAD ISR 9/2018), CKD (baseline cr 1.9), chronic diastolic CHF (EF 66%), hepatitis B, and GERD who presented to Dr. Avendano's office complaining of worsening SSCP and SOB on exertion of walking one block, relieved with rest and SL NTG, x 1 month. Patient denies palpitations, PND, orthopnea, N/V, hematochezia, melena, LE edema, dizziness, syncope. Echo 04/24/18 indicates mild MR, mild concentric LVH, grossly normal LV systolic function with EF of 50-55%, grade I diastolic dysfunction. EKG 6/5/19 revealed SR with NEW TWI in V1-V3 per MD note. Upon further questioning, patient admits to missing occasional dose of Brilinta and Aspirin. In light of patient's risk factors and CCS Angina Class III symptoms, patient is was referred to [facility] for recommended cardiac catheterization with possible intervention. Patient underwent cardiac cath on 6/11/19 and received PTCA proxLAD ISR, residual proxRCA 30-50% ISR, right radial access. Patient to return for laser and brachytherapy of LAD in 5 weeks. Patient underwent b/l LE arterial duplex prior to d/c to eval for PAD given symptoms of claudication per Dr. Avendano. Patient was seen and examined this AM and was asymptomatic without complaints. VSS, labs and tele reviewed. Patient is stable for discharge per Dr. Huggins. He has been given appropriate discharge instructions including medication regimen, access site management and follow up. Medications e-prescribed to preferred pharmacy. Discharge instructions provided via [employer] [place]  #[id number].        Temp 98F, HR 56bpm, /56, RR 16, O2 sat 97% RA    Gen: NAD, A&O x 3    Cards: RRR, clear S1 and S2 without murmur    Pulm: CTA b/l without w/r/r    Right wrist: no hematoma or ooze, radial pulse 2+, good cap refill    Abd: BS x 4, soft, NT    Ext: No LE edema or ulcerations b/l 82yo Japanese Speaking M, CONTRAST DYE AND CHLORHEXIDINE ALLERGIES, poor MD follow up, questionable med compliance, with PMHx of HTN, HLD, DM, CAD s/p PCI (last PTCA p/mLAD ISR 9/2018), CKD (baseline cr 1.9), chronic diastolic CHF (EF 66%), hepatitis B, and GERD who presented to Dr. Avendano's office complaining of worsening SSCP and SOB on exertion of walking one block, relieved with rest and SL NTG, x 1 month. Patient denies palpitations, PND, orthopnea, N/V, hematochezia, melena, LE edema, dizziness, syncope. Echo 04/24/18 indicates mild MR, mild concentric LVH, grossly normal LV systolic function with EF of 50-55%, grade I diastolic dysfunction. EKG 6/5/19 revealed SR with NEW TWI in V1-V3 per MD note. Upon further questioning, patient admits to missing occasional dose of Brilinta and Aspirin. In light of patient's risk factors and CCS Angina Class III symptoms, patient is was referred to [facility] for recommended cardiac catheterization with possible intervention. Patient underwent cardiac cath on 6/11/19 and received PTCA proxLAD ISR, residual proxRCA 30-50% ISR, right radial access. Patient to return for laser and brachytherapy of LAD in 5 weeks. Patient underwent b/l LE arterial duplex prior to d/c to eval for PAD given symptoms of claudication per Dr. Avendano. Patient was seen and examined this AM and was asymptomatic without complaints. VSS, labs and tele reviewed. Patient is stable for discharge per Dr. Huggins. He has been given appropriate discharge instructions including medication regimen, access site management and follow up. Medications e-prescribed to preferred pharmacy. Discharge instructions provided via [employer] [place]  #[id number].        Of note, prior to d/c patient with HA, nausea and episode of vomiting. Patient reports he is reluctant to leave. On further conversation between patient and Dr. Avendano, patient reports he is upset he was at ultrasound for many hours and would like to eat before leaving. Patient given dinner and tylenol with improvement. He is agreeable to go home. He will follow up with his PMD for continued symptoms.        Temp 98F, HR 56bpm, /56, RR 16, O2 sat 97% RA    Gen: NAD, A&O x 3    Cards: RRR, clear S1 and S2 without murmur    Pulm: CTA b/l without w/r/r    Right wrist: no hematoma or ooze, radial pulse 2+, good cap refill    Abd: BS x 4, soft, NT    Ext: No LE edema or ulcerations b/l 84yo Tajik Speaking M, CONTRAST DYE AND CHLORHEXIDINE ALLERGIES, poor MD follow up, questionable med compliance, with PMHx of HTN, HLD, DM, CAD s/p PCI (last PTCA p/mLAD ISR 9/2018), CKD (baseline cr 1.9), chronic diastolic CHF (EF 66%), hepatitis B, and GERD who presented to Dr. Avendano's office complaining of worsening SSCP and SOB on exertion of walking one block, relieved with rest and SL NTG, x 1 month. Patient denies palpitations, PND, orthopnea, N/V, hematochezia, melena, LE edema, dizziness, syncope. Echo 04/24/18 indicates mild MR, mild concentric LVH, grossly normal LV systolic function with EF of 50-55%, grade I diastolic dysfunction. EKG 6/5/19 revealed SR with NEW TWI in V1-V3 per MD note. Upon further questioning, patient admits to missing occasional dose of Brilinta and Aspirin. In light of patient's risk factors and CCS Angina Class III symptoms, patient is was referred to [facility] for recommended cardiac catheterization with possible intervention. Patient underwent cardiac cath on 6/11/19 and received PTCA proxLAD ISR, residual proxRCA 30-50% ISR, right radial access. Patient to return for laser and brachytherapy of LAD in 5 weeks. Patient underwent b/l LE arterial duplex prior to d/c to eval for PAD given symptoms of claudication per Dr. Avendano. Patient was seen and examined this AM and was asymptomatic without complaints. VSS, labs and tele reviewed. Patient is stable for discharge per Dr. Huggins. He has been given appropriate discharge instructions including medication regimen, access site management and follow up. Medications e-prescribed to preferred pharmacy. Discharge instructions provided via [employer] [place]  #[id number].        Of note, prior to d/c patient with HA, nausea and episode of vomiting. Patient reports he is reluctant to leave. On further conversation between patient and Dr. Avendano, patient reports he is upset he was at ultrasound for many hours and would like to eat before leaving. Patient given dinner and tylenol with improvement. Patient with VSS. He is agreeable to go home. He will follow up with his PMD for continued symptoms.        Temp 98F, HR 56bpm, /56, RR 16, O2 sat 97% RA    Gen: NAD, A&O x 3    Cards: RRR, clear S1 and S2 without murmur    Pulm: CTA b/l without w/r/r    Right wrist: no hematoma or ooze, radial pulse 2+, good cap refill    Abd: BS x 4, soft, NT    Ext: No LE edema or ulcerations b/l    Neuro: non-focal 84yo Lao Speaking M, CONTRAST DYE AND CHLORHEXIDINE ALLERGIES, poor MD follow up, questionable med compliance, with PMHx of HTN, HLD, DM, CAD s/p PCI (last PTCA p/mLAD ISR 9/2018), CKD (baseline cr 1.9), chronic diastolic CHF (EF 66%), hepatitis B, and GERD who presented to Dr. Avendano's office complaining of worsening SSCP and SOB on exertion of walking one block, relieved with rest and SL NTG, x 1 month. Patient denies palpitations, PND, orthopnea, N/V, hematochezia, melena, LE edema, dizziness, syncope. Echo 04/24/18 indicates mild MR, mild concentric LVH, grossly normal LV systolic function with EF of 50-55%, grade I diastolic dysfunction. EKG 6/5/19 revealed SR with NEW TWI in V1-V3 per MD note. Upon further questioning, patient admits to missing occasional dose of Brilinta and Aspirin. In light of patient's risk factors and CCS Angina Class III symptoms, patient is was referred to [facility] for recommended cardiac catheterization with possible intervention. Patient underwent cardiac cath on 6/11/19 and received PTCA proxLAD ISR, residual proxRCA 30-50% ISR, right radial access. Patient to return for laser and brachytherapy of LAD in 5 weeks. Patient underwent b/l LE arterial duplex prior to d/c to eval for PAD given symptoms of claudication per Dr. Avendano. Patient was seen and examined this AM and was asymptomatic without complaints. VSS, labs and tele reviewed. Patient is stable for discharge per Dr. Huggins. He has been given appropriate discharge instructions including medication regimen, access site management and follow up. Medications e-prescribed to preferred pharmacy. Discharge instructions provided via [employer] [place]  #[id number].        Of note, prior to d/c patient with HA, nausea and episode of vomiting. Patient reports he is reluctant to leave. On further conversation between patient and Dr. Avendano, patient reports he is upset he was at ultrasound for many hours and would like to eat before leaving. Patient given dinner and tylenol with improvement. Patient with VSS, afebrile with FS wnl. He is agreeable to go home and can be discharged per Dr Huggins. He will follow up with his PMD for continued symptoms.        Temp 98F, HR 56bpm, /56, RR 16, O2 sat 97% RA    Gen: NAD, A&O x 3    Cards: RRR, clear S1 and S2 without murmur    Pulm: CTA b/l without w/r/r    Right wrist: no hematoma or ooze, radial pulse 2+, good cap refill    Abd: BS x 4, soft, NT    Ext: No LE edema or ulcerations b/l    Neuro: non-focal

## 2019-06-18 DIAGNOSIS — K21.9 GASTRO-ESOPHAGEAL REFLUX DISEASE WITHOUT ESOPHAGITIS: ICD-10-CM

## 2019-06-18 DIAGNOSIS — Z86.19 PERSONAL HISTORY OF OTHER INFECTIOUS AND PARASITIC DISEASES: ICD-10-CM

## 2019-06-18 DIAGNOSIS — I50.32 CHRONIC DIASTOLIC (CONGESTIVE) HEART FAILURE: ICD-10-CM

## 2019-06-18 DIAGNOSIS — I25.110 ATHEROSCLEROTIC HEART DISEASE OF NATIVE CORONARY ARTERY WITH UNSTABLE ANGINA PECTORIS: ICD-10-CM

## 2019-06-18 DIAGNOSIS — N18.9 CHRONIC KIDNEY DISEASE, UNSPECIFIED: ICD-10-CM
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
## 2019-06-18 DIAGNOSIS — I13.0 HYPERTENSIVE HEART AND CHRONIC KIDNEY DISEASE WITH HEART FAILURE AND STAGE 1 THROUGH STAGE 4 CHRONIC KIDNEY DISEASE, OR UNSPECIFIED CHRONIC KIDNEY DISEASE: ICD-10-CM

## 2019-06-18 DIAGNOSIS — E11.22 TYPE 2 DIABETES MELLITUS WITH DIABETIC CHRONIC KIDNEY DISEASE: ICD-10-CM

## 2019-06-18 DIAGNOSIS — E78.5 HYPERLIPIDEMIA, UNSPECIFIED: ICD-10-CM

## 2019-06-18 DIAGNOSIS — Z95.5 PRESENCE OF CORONARY ANGIOPLASTY IMPLANT AND GRAFT: ICD-10-CM

## 2019-06-18 DIAGNOSIS — T82.855A STENOSIS OF CORONARY ARTERY STENT, INITIAL ENCOUNTER: ICD-10-CM

## 2019-06-18 DIAGNOSIS — Z79.84 LONG TERM (CURRENT) USE OF ORAL HYPOGLYCEMIC DRUGS: ICD-10-CM

## 2019-06-18 DIAGNOSIS — Z88.8 ALLERGY STATUS TO OTHER DRUGS, MEDICAMENTS AND BIOLOGICAL SUBSTANCES: ICD-10-CM

## 2019-06-18 DIAGNOSIS — I73.9 PERIPHERAL VASCULAR DISEASE, UNSPECIFIED: ICD-10-CM

## 2019-06-18 DIAGNOSIS — Z91.041 RADIOGRAPHIC DYE ALLERGY STATUS: ICD-10-CM

## 2019-06-18 DIAGNOSIS — Z91.19 PATIENT'S NONCOMPLIANCE WITH OTHER MEDICAL TREATMENT AND REGIMEN: ICD-10-CM

## 2019-06-20 ENCOUNTER — APPOINTMENT (OUTPATIENT)
Dept: CARDIOLOGY | Facility: CLINIC | Age: 84
End: 2019-06-20
Payer: MEDICARE

## 2019-06-20 VITALS
WEIGHT: 138 LBS | BODY MASS INDEX: 26.95 KG/M2 | SYSTOLIC BLOOD PRESSURE: 120 MMHG | OXYGEN SATURATION: 95 % | HEART RATE: 60 BPM | RESPIRATION RATE: 18 BRPM | DIASTOLIC BLOOD PRESSURE: 65 MMHG | TEMPERATURE: 97.5 F

## 2019-06-20 PROCEDURE — 93000 ELECTROCARDIOGRAM COMPLETE: CPT

## 2019-06-20 PROCEDURE — 99495 TRANSJ CARE MGMT MOD F2F 14D: CPT

## 2019-06-20 NOTE — REASON FOR VISIT
[Coronary Artery Disease] : coronary artery disease [Follow-Up - Clinic] : a clinic follow-up of [Hypertension] : hypertension [Hyperlipidemia] : hyperlipidemia [FreeTextEntry1] : 83 M HTN hyperlipidemia CKD Cr 1.9 CAD s/p PCI with CP and claudication.\par \par  \par

## 2019-06-20 NOTE — PHYSICAL EXAM
[General Appearance - Well Developed] : well developed [General Appearance - Well Nourished] : well nourished [Well Groomed] : well groomed [Normal Appearance] : normal appearance [General Appearance - In No Acute Distress] : no acute distress [Normal Conjunctiva] : the conjunctiva exhibited no abnormalities [No Deformities] : no deformities [Eyelids - No Xanthelasma] : the eyelids demonstrated no xanthelasmas [Normal Oral Mucosa] : normal oral mucosa [Normal Jugular Venous A Waves Present] : normal jugular venous A waves present [No Oral Cyanosis] : no oral cyanosis [No Oral Pallor] : no oral pallor [Normal Jugular Venous V Waves Present] : normal jugular venous V waves present [No Jugular Venous Zabala A Waves] : no jugular venous zabala A waves [Auscultation Breath Sounds / Voice Sounds] : lungs were clear to auscultation bilaterally [Respiration, Rhythm And Depth] : normal respiratory rhythm and effort [Exaggerated Use Of Accessory Muscles For Inspiration] : no accessory muscle use [Heart Sounds] : normal S1 and S2 [Murmurs] : no murmurs present [Heart Rate And Rhythm] : heart rate and rhythm were normal [Abdomen Soft] : soft [Abdomen Tenderness] : non-tender [Nail Clubbing] : no clubbing of the fingernails [Abdomen Mass (___ Cm)] : no abdominal mass palpated [Cyanosis, Localized] : no localized cyanosis [Petechial Hemorrhages (___cm)] : no petechial hemorrhages [Skin Color & Pigmentation] : normal skin color and pigmentation [] : no rash [No Skin Ulcers] : no skin ulcer [No Venous Stasis] : no venous stasis [Skin Lesions] : no skin lesions [Oriented To Time, Place, And Person] : oriented to person, place, and time [No Xanthoma] : no  xanthoma was observed [Affect] : the affect was normal [Mood] : the mood was normal [No Anxiety] : not feeling anxious

## 2019-06-20 NOTE — DISCUSSION/SUMMARY
[FreeTextEntry1] : 83 M HTN hyperlipidemia CKD CAD s/p PCI for f/u.\par Continue meds for HTN.\par Continue statin.\par MOnitor Cr. ASA and plavix for CAD.\par Patient seen within 14 days of discharge. Hospital and cath records reviewed.

## 2019-06-20 NOTE — HISTORY OF PRESENT ILLNESS
[FreeTextEntry1] : 1. HTN: on medications, compliant with medications.\par 2. Hyperlipidemia: on statin. Lipid profile is followed by PMD.\par 3. CAD s/p PCI: patient has had mutiple PCIs including one year ago at [facility]. \par He underwent cardiac cath at [facility] on 9/25/18 and he had ISR of prox and mid LAD stents. \par He underwent repeat cardiac cath [facility] 6/2019 and was found to have ISR to the LAD and had PCI. \par  Since his discharge, he was admitted to [facility] for syncope. CP has improved. Patient is on ASA and plavix.

## 2019-06-28 PROCEDURE — 80053 COMPREHEN METABOLIC PANEL: CPT

## 2019-06-28 PROCEDURE — 85027 COMPLETE CBC AUTOMATED: CPT

## 2019-06-28 PROCEDURE — 85730 THROMBOPLASTIN TIME PARTIAL: CPT

## 2019-06-28 PROCEDURE — 82962 GLUCOSE BLOOD TEST: CPT

## 2019-06-28 PROCEDURE — 93454 CORONARY ARTERY ANGIO S&I: CPT

## 2019-06-28 PROCEDURE — C1887: CPT

## 2019-06-28 PROCEDURE — 83735 ASSAY OF MAGNESIUM: CPT

## 2019-06-28 PROCEDURE — 85025 COMPLETE CBC W/AUTO DIFF WBC: CPT

## 2019-06-28 PROCEDURE — 92920 PRQ TRLUML C ANGIOP 1ART&/BR: CPT

## 2019-06-28 PROCEDURE — 93925 LOWER EXTREMITY STUDY: CPT

## 2019-06-28 PROCEDURE — 85610 PROTHROMBIN TIME: CPT

## 2019-06-28 PROCEDURE — 36415 COLL VENOUS BLD VENIPUNCTURE: CPT

## 2019-06-28 PROCEDURE — 93005 ELECTROCARDIOGRAM TRACING: CPT

## 2019-06-28 PROCEDURE — C1885: CPT

## 2019-06-28 PROCEDURE — 82550 ASSAY OF CK (CPK): CPT

## 2019-06-28 PROCEDURE — 83036 HEMOGLOBIN GLYCOSYLATED A1C: CPT

## 2019-06-28 PROCEDURE — 80061 LIPID PANEL: CPT

## 2019-06-28 PROCEDURE — 82553 CREATINE MB FRACTION: CPT

## 2019-06-28 PROCEDURE — C1769: CPT

## 2019-06-28 PROCEDURE — C1725: CPT

## 2019-07-11 ENCOUNTER — APPOINTMENT (OUTPATIENT)
Dept: CARDIOLOGY | Facility: CLINIC | Age: 84
End: 2019-07-11
Payer: MEDICARE

## 2019-07-11 VITALS
TEMPERATURE: 97.4 F | BODY MASS INDEX: 27.15 KG/M2 | DIASTOLIC BLOOD PRESSURE: 61 MMHG | RESPIRATION RATE: 18 BRPM | WEIGHT: 139 LBS | HEART RATE: 66 BPM | OXYGEN SATURATION: 95 % | SYSTOLIC BLOOD PRESSURE: 106 MMHG

## 2019-07-11 PROCEDURE — 99214 OFFICE O/P EST MOD 30 MIN: CPT

## 2019-07-11 PROCEDURE — 93000 ELECTROCARDIOGRAM COMPLETE: CPT

## 2019-07-11 RX ORDER — UMECLIDINIUM BROMIDE AND VILANTEROL TRIFENATATE 62.5; 25 UG/1; UG/1
62.5-25 POWDER RESPIRATORY (INHALATION)
Qty: 60 | Refills: 0 | Status: ACTIVE | COMMUNITY
Start: 2019-05-18

## 2019-07-11 RX ORDER — ISOPROPYL ALCOHOL 70 G/100G
70 LIQUID TOPICAL
Qty: 100 | Refills: 0 | Status: ACTIVE | COMMUNITY
Start: 2019-04-17

## 2019-07-11 RX ORDER — PANTOPRAZOLE 40 MG/1
40 TABLET, DELAYED RELEASE ORAL
Qty: 30 | Refills: 0 | Status: ACTIVE | COMMUNITY
Start: 2019-06-20

## 2019-07-11 RX ORDER — SODIUM POLYSTYRENE SULFONATE 4.1 MEQ/G
POWDER, FOR SUSPENSION ORAL; RECTAL
Qty: 454 | Refills: 0 | Status: ACTIVE | COMMUNITY
Start: 2019-05-13

## 2019-07-11 RX ORDER — PEN NEEDLE, DIABETIC 31 GX3/16"
31G X 5 MM NEEDLE, DISPOSABLE MISCELLANEOUS
Qty: 100 | Refills: 0 | Status: ACTIVE | COMMUNITY
Start: 2019-05-17

## 2019-07-11 RX ORDER — ROSUVASTATIN CALCIUM 10 MG/1
10 TABLET, FILM COATED ORAL
Qty: 30 | Refills: 0 | Status: ACTIVE | COMMUNITY
Start: 2019-04-17

## 2019-07-11 RX ORDER — INSULIN GLARGINE 100 [IU]/ML
100 INJECTION, SOLUTION SUBCUTANEOUS
Qty: 9 | Refills: 0 | Status: ACTIVE | COMMUNITY
Start: 2019-05-13

## 2019-07-11 RX ORDER — POTASSIUM CHLORIDE 750 MG/1
10 TABLET, EXTENDED RELEASE ORAL
Qty: 30 | Refills: 0 | Status: ACTIVE | COMMUNITY
Start: 2019-04-17

## 2019-07-11 RX ORDER — TENOFOVIR ALAFENAMIDE 25 MG/1
25 TABLET ORAL
Qty: 30 | Refills: 0 | Status: ACTIVE | COMMUNITY
Start: 2019-04-20

## 2019-07-11 RX ORDER — HYDROCORTISONE 1 %
12 CREAM (GRAM) TOPICAL
Qty: 400 | Refills: 0 | Status: ACTIVE | COMMUNITY
Start: 2019-04-17

## 2019-07-11 NOTE — DISCUSSION/SUMMARY
[FreeTextEntry1] : 84 M HTN hyperlipidemia CAD s/p PCI PVD for f/u.\par Syncope improved.\par Continue meds for HTN.\par MOnitor CKD.\par Continue ASA and plavix.

## 2019-07-11 NOTE — HISTORY OF PRESENT ILLNESS
[FreeTextEntry1] : 1. HTN: on medications, BP control improved.\par 2. Hyperlipidemia: on statin. No muscle pain is noted. \par 3. CAD s/p PCI: patient has had mutiple PCIs including one year ago at [facility]. \par He underwent cardiac cath at [facility] on 9/25/18 and he had ISR of prox and mid LAD stents. \par He underwent repeat cardiac cath [facility] 6/2019 and was found to have ISR to the LAD and had PCI. \par  Since his discharge, he was admitted to [facility] for syncope. CP has improved. Patient is on ASA and plavix. \par He has been compliant with his medications. Syncope has improved.\par No CP. ET is limited by leg pain.\par  \par

## 2019-07-11 NOTE — REASON FOR VISIT
[Follow-Up - Clinic] : a clinic follow-up of [Coronary Artery Disease] : coronary artery disease [Hyperlipidemia] : hyperlipidemia [Hypertension] : hypertension [FreeTextEntry1] : 84 M HTN hyperlipidemia CKD Cr 1.9 CAD s/p PCI with CP and claudication.

## 2019-07-11 NOTE — PHYSICAL EXAM
[General Appearance - Well Developed] : well developed [Normal Appearance] : normal appearance [General Appearance - Well Nourished] : well nourished [No Deformities] : no deformities [Well Groomed] : well groomed [General Appearance - In No Acute Distress] : no acute distress [Normal Conjunctiva] : the conjunctiva exhibited no abnormalities [Eyelids - No Xanthelasma] : the eyelids demonstrated no xanthelasmas [Normal Oral Mucosa] : normal oral mucosa [No Oral Pallor] : no oral pallor [No Oral Cyanosis] : no oral cyanosis [Normal Jugular Venous A Waves Present] : normal jugular venous A waves present [Normal Jugular Venous V Waves Present] : normal jugular venous V waves present [No Jugular Venous Zabala A Waves] : no jugular venous zabala A waves [Respiration, Rhythm And Depth] : normal respiratory rhythm and effort [Exaggerated Use Of Accessory Muscles For Inspiration] : no accessory muscle use [Auscultation Breath Sounds / Voice Sounds] : lungs were clear to auscultation bilaterally [Heart Rate And Rhythm] : heart rate and rhythm were normal [Heart Sounds] : normal S1 and S2 [Murmurs] : no murmurs present [Abdomen Soft] : soft [Abdomen Tenderness] : non-tender [Abdomen Mass (___ Cm)] : no abdominal mass palpated [Nail Clubbing] : no clubbing of the fingernails [Cyanosis, Localized] : no localized cyanosis [Petechial Hemorrhages (___cm)] : no petechial hemorrhages [Skin Color & Pigmentation] : normal skin color and pigmentation [] : no rash [No Venous Stasis] : no venous stasis [Skin Lesions] : no skin lesions [No Skin Ulcers] : no skin ulcer [No Xanthoma] : no  xanthoma was observed [Oriented To Time, Place, And Person] : oriented to person, place, and time [Affect] : the affect was normal [No Anxiety] : not feeling anxious [Mood] : the mood was normal

## 2019-08-08 ENCOUNTER — APPOINTMENT (OUTPATIENT)
Dept: CARDIOLOGY | Facility: CLINIC | Age: 84
End: 2019-08-08
Payer: MEDICARE

## 2019-08-08 VITALS
WEIGHT: 142 LBS | SYSTOLIC BLOOD PRESSURE: 107 MMHG | RESPIRATION RATE: 18 BRPM | BODY MASS INDEX: 27.73 KG/M2 | TEMPERATURE: 97.6 F | HEART RATE: 65 BPM | DIASTOLIC BLOOD PRESSURE: 64 MMHG | OXYGEN SATURATION: 94 %

## 2019-08-08 PROCEDURE — 93000 ELECTROCARDIOGRAM COMPLETE: CPT

## 2019-08-08 PROCEDURE — 99214 OFFICE O/P EST MOD 30 MIN: CPT

## 2019-08-08 NOTE — PHYSICAL EXAM
[General Appearance - Well Developed] : well developed [Normal Appearance] : normal appearance [Well Groomed] : well groomed [General Appearance - Well Nourished] : well nourished [No Deformities] : no deformities [General Appearance - In No Acute Distress] : no acute distress [Normal Conjunctiva] : the conjunctiva exhibited no abnormalities [Eyelids - No Xanthelasma] : the eyelids demonstrated no xanthelasmas [No Oral Pallor] : no oral pallor [Normal Oral Mucosa] : normal oral mucosa [No Oral Cyanosis] : no oral cyanosis [Normal Jugular Venous A Waves Present] : normal jugular venous A waves present [Normal Jugular Venous V Waves Present] : normal jugular venous V waves present [No Jugular Venous Zabala A Waves] : no jugular venous zabala A waves [Respiration, Rhythm And Depth] : normal respiratory rhythm and effort [Exaggerated Use Of Accessory Muscles For Inspiration] : no accessory muscle use [Auscultation Breath Sounds / Voice Sounds] : lungs were clear to auscultation bilaterally [Heart Rate And Rhythm] : heart rate and rhythm were normal [Heart Sounds] : normal S1 and S2 [Murmurs] : no murmurs present [Abdomen Soft] : soft [Abdomen Tenderness] : non-tender [Abdomen Mass (___ Cm)] : no abdominal mass palpated [Nail Clubbing] : no clubbing of the fingernails [Cyanosis, Localized] : no localized cyanosis [Petechial Hemorrhages (___cm)] : no petechial hemorrhages [Skin Color & Pigmentation] : normal skin color and pigmentation [] : no rash [No Venous Stasis] : no venous stasis [Skin Lesions] : no skin lesions [No Skin Ulcers] : no skin ulcer [No Xanthoma] : no  xanthoma was observed [Oriented To Time, Place, And Person] : oriented to person, place, and time [Mood] : the mood was normal [Affect] : the affect was normal [No Anxiety] : not feeling anxious

## 2019-08-08 NOTE — HISTORY OF PRESENT ILLNESS
[FreeTextEntry1] : 1. HTN: on medications, BP control improved.\par 2. Hyperlipidemia: on statin. No muscle pain is noted. \par 3. CAD s/p PCI: patient has had mutiple PCIs.\par He underwent cardiac cath at [facility] on 9/25/18 and he had ISR of prox and mid LAD stents. \par He underwent repeat cardiac cath [facility] 6/2019 and was found to have ISR to the LAD and had PCI. \par He was admitted to [facility] in 6/2019 due to hypotension. Now his dizziness has improved but the patient reports intermittent CP with exertion. No SOB. ET is limited by leg pain.\par  \par

## 2019-08-08 NOTE — REASON FOR VISIT
[Follow-Up - Clinic] : a clinic follow-up of [Coronary Artery Disease] : coronary artery disease [Hyperlipidemia] : hyperlipidemia [Hypertension] : hypertension [FreeTextEntry1] : 84 M HTN hyperlipidemia CKD Cr 1.9 CAD s/p PCI with CP and claudication. \par

## 2019-08-08 NOTE — DISCUSSION/SUMMARY
[FreeTextEntry1] : 84 M HTN hyperlipidemia CKD CAD s/p PCI with CP.\par REFER FOR CATHETERIZATION.\par Continue meds for HTN.\par Continue ASA Brilinta. \par Continue statin.

## 2020-07-29 NOTE — ED ADULT NURSE REASSESSMENT NOTE - SKIN TURGOR
Please javy [facility] to cancel dex prescription  Patient wants to send to [facility], sent over there  Thanks
RN contacted [facility] pharmacy and spoke to pharmacist Ahmed and cancelled dexamethasone script.
resilient/elastic

## 2020-08-20 ENCOUNTER — APPOINTMENT (OUTPATIENT)
Dept: CARDIOLOGY | Facility: CLINIC | Age: 85
End: 2020-08-20
Payer: MEDICARE

## 2020-08-20 ENCOUNTER — NON-APPOINTMENT (OUTPATIENT)
Age: 85
End: 2020-08-20

## 2020-08-20 VITALS
RESPIRATION RATE: 18 BRPM | DIASTOLIC BLOOD PRESSURE: 58 MMHG | OXYGEN SATURATION: 96 % | BODY MASS INDEX: 24.8 KG/M2 | TEMPERATURE: 98.1 F | WEIGHT: 127 LBS | HEART RATE: 68 BPM | SYSTOLIC BLOOD PRESSURE: 103 MMHG

## 2020-08-20 PROCEDURE — 93000 ELECTROCARDIOGRAM COMPLETE: CPT

## 2020-08-20 PROCEDURE — 99214 OFFICE O/P EST MOD 30 MIN: CPT

## 2020-08-20 NOTE — DISCUSSION/SUMMARY
[FreeTextEntry1] : 85 M HTN hyperlipidemia DM CAD s/p PCI for f/u after hospitalization.\par Continue medications for HTN.\par Continue statin.\par ASA and Brilinta for CAD.

## 2020-08-20 NOTE — HISTORY OF PRESENT ILLNESS
[FreeTextEntry1] :  \par 1. HTN: on medications, no SE noted.\par 2. Hyperlipidemia: on statin. Lipid profile is followed by PMD.\par 3. CAD s/p PCI: patient has had mutiple PCIs.\par He underwent cardiac cath at [facility] on 18 and he had ISR of prox and mid LAD stents. \par He underwent repeat cardiac cath [facility] 2019 and was found to have ISR to the LAD and had PCI. \par He was admitted to [facility] in 2019 due to hypotension. \par He was admitted to [facility] due to PNA in 2020. He denies CP, cough or fevers. He has mild exertional SOB.\par Echo at [facility] showed normal LV function. \par \par  \par Cardiology Summary\par \par EK19 NSR nl axis RBBB  \par \par \par

## 2020-08-20 NOTE — PHYSICAL EXAM
[General Appearance - Well Developed] : well developed [Normal Appearance] : normal appearance [Well Groomed] : well groomed [No Deformities] : no deformities [General Appearance - Well Nourished] : well nourished [General Appearance - In No Acute Distress] : no acute distress [Normal Conjunctiva] : the conjunctiva exhibited no abnormalities [Eyelids - No Xanthelasma] : the eyelids demonstrated no xanthelasmas [Normal Oral Mucosa] : normal oral mucosa [No Oral Pallor] : no oral pallor [Normal Jugular Venous A Waves Present] : normal jugular venous A waves present [Normal Jugular Venous V Waves Present] : normal jugular venous V waves present [No Oral Cyanosis] : no oral cyanosis [No Jugular Venous Zabala A Waves] : no jugular venous zabala A waves [Exaggerated Use Of Accessory Muscles For Inspiration] : no accessory muscle use [Auscultation Breath Sounds / Voice Sounds] : lungs were clear to auscultation bilaterally [Respiration, Rhythm And Depth] : normal respiratory rhythm and effort [Heart Rate And Rhythm] : heart rate and rhythm were normal [Heart Sounds] : normal S1 and S2 [Murmurs] : no murmurs present [Abdomen Soft] : soft [Abdomen Tenderness] : non-tender [Abdomen Mass (___ Cm)] : no abdominal mass palpated [Nail Clubbing] : no clubbing of the fingernails [Petechial Hemorrhages (___cm)] : no petechial hemorrhages [Cyanosis, Localized] : no localized cyanosis [Skin Color & Pigmentation] : normal skin color and pigmentation [No Venous Stasis] : no venous stasis [] : no rash [Skin Lesions] : no skin lesions [No Skin Ulcers] : no skin ulcer [No Xanthoma] : no  xanthoma was observed [Affect] : the affect was normal [Oriented To Time, Place, And Person] : oriented to person, place, and time [Mood] : the mood was normal [No Anxiety] : not feeling anxious

## 2020-08-20 NOTE — REASON FOR VISIT
[Follow-Up - Clinic] : a clinic follow-up of [Hyperlipidemia] : hyperlipidemia [Coronary Artery Disease] : coronary artery disease [Hypertension] : hypertension [FreeTextEntry1] : 85 M HTN hyperlipidemia CKD Cr 1.9 CAD s/p PCI for f/u after hospitalization.\par

## 2020-10-26 ENCOUNTER — APPOINTMENT (OUTPATIENT)
Dept: CARDIOLOGY | Facility: CLINIC | Age: 85
End: 2020-10-26

## 2020-10-27 NOTE — PATIENT PROFILE ADULT. - TEACHING/LEARNING LEARNING PREFERENCES
----- Message from April Branham sent at 10/27/2020  2:14 PM CDT -----  Phone #: [phone]  Patient is requesting a refill of Onycodone-7.5/325-Dr Herring      Pharmacy:   [id number] - [place], [place] - [address] [address]  Phone: [phone] Fax: [phone]           
Script sent to pharmacy  
verbal instruction/skill demonstration/written material

## 2020-11-06 ENCOUNTER — INPATIENT (INPATIENT)
Facility: HOSPITAL | Age: 85
LOS: 4 days | Discharge: HOME | DRG: 378 | End: 2020-11-11
Attending: STUDENT IN AN ORGANIZED HEALTH CARE EDUCATION/TRAINING PROGRAM | Admitting: STUDENT IN AN ORGANIZED HEALTH CARE EDUCATION/TRAINING PROGRAM
Payer: MEDICARE

## 2020-11-06 VITALS
WEIGHT: 139.99 LBS | SYSTOLIC BLOOD PRESSURE: 155 MMHG | HEIGHT: 61 IN | DIASTOLIC BLOOD PRESSURE: 65 MMHG | OXYGEN SATURATION: 97 % | RESPIRATION RATE: 20 BRPM | HEART RATE: 65 BPM | TEMPERATURE: 97 F

## 2020-11-06 DIAGNOSIS — K92.2 GASTROINTESTINAL HEMORRHAGE, UNSPECIFIED: ICD-10-CM

## 2020-11-06 DIAGNOSIS — B19.10 UNSPECIFIED VIRAL HEPATITIS B WITHOUT HEPATIC COMA: ICD-10-CM

## 2020-11-06 DIAGNOSIS — Z29.9 ENCOUNTER FOR PROPHYLACTIC MEASURES, UNSPECIFIED: ICD-10-CM

## 2020-11-06 DIAGNOSIS — N18.9 CHRONIC KIDNEY DISEASE, UNSPECIFIED: ICD-10-CM

## 2020-11-06 DIAGNOSIS — I50.32 CHRONIC DIASTOLIC (CONGESTIVE) HEART FAILURE: ICD-10-CM

## 2020-11-06 DIAGNOSIS — I25.10 ATHEROSCLEROTIC HEART DISEASE OF NATIVE CORONARY ARTERY WITHOUT ANGINA PECTORIS: ICD-10-CM

## 2020-11-06 DIAGNOSIS — R19.7 DIARRHEA, UNSPECIFIED: ICD-10-CM

## 2020-11-06 DIAGNOSIS — E11.9 TYPE 2 DIABETES MELLITUS WITHOUT COMPLICATIONS: ICD-10-CM

## 2020-11-06 DIAGNOSIS — I10 ESSENTIAL (PRIMARY) HYPERTENSION: ICD-10-CM

## 2020-11-06 DIAGNOSIS — N40.0 BENIGN PROSTATIC HYPERPLASIA WITHOUT LOWER URINARY TRACT SYMPTOMS: ICD-10-CM

## 2020-11-06 LAB
ALBUMIN SERPL ELPH-MCNC: 2.8 G/DL — LOW (ref 3.5–5)
ALP SERPL-CCNC: 143 U/L — HIGH (ref 40–120)
ALT FLD-CCNC: 13 U/L DA — SIGNIFICANT CHANGE UP (ref 10–60)
ANION GAP SERPL CALC-SCNC: 9 MMOL/L — SIGNIFICANT CHANGE UP (ref 5–17)
APPEARANCE UR: CLEAR — SIGNIFICANT CHANGE UP
APTT BLD: 28 SEC — SIGNIFICANT CHANGE UP (ref 27.5–35.5)
AST SERPL-CCNC: 15 U/L — SIGNIFICANT CHANGE UP (ref 10–40)
BASOPHILS # BLD AUTO: 0.04 K/UL — SIGNIFICANT CHANGE UP (ref 0–0.2)
BASOPHILS NFR BLD AUTO: 0.3 % — SIGNIFICANT CHANGE UP (ref 0–2)
BILIRUB DIRECT SERPL-MCNC: 0.1 MG/DL — SIGNIFICANT CHANGE UP (ref 0–0.2)
BILIRUB INDIRECT FLD-MCNC: 0.5 MG/DL — SIGNIFICANT CHANGE UP (ref 0.2–1)
BILIRUB SERPL-MCNC: 0.6 MG/DL — SIGNIFICANT CHANGE UP (ref 0.2–1.2)
BILIRUB SERPL-MCNC: 0.6 MG/DL — SIGNIFICANT CHANGE UP (ref 0.2–1.2)
BILIRUB UR-MCNC: NEGATIVE — SIGNIFICANT CHANGE UP
BUN SERPL-MCNC: 38 MG/DL — HIGH (ref 7–18)
CALCIUM SERPL-MCNC: 8.7 MG/DL — SIGNIFICANT CHANGE UP (ref 8.4–10.5)
CHLORIDE SERPL-SCNC: 103 MMOL/L — SIGNIFICANT CHANGE UP (ref 96–108)
CK MB BLD-MCNC: 3.7 % — HIGH (ref 0–3.5)
CK MB CFR SERPL CALC: 2.1 NG/ML — SIGNIFICANT CHANGE UP (ref 0–3.6)
CK SERPL-CCNC: 57 U/L — SIGNIFICANT CHANGE UP (ref 35–232)
CO2 SERPL-SCNC: 27 MMOL/L — SIGNIFICANT CHANGE UP (ref 22–31)
COLOR SPEC: YELLOW — SIGNIFICANT CHANGE UP
CREAT SERPL-MCNC: 1.93 MG/DL — HIGH (ref 0.5–1.3)
DIFF PNL FLD: ABNORMAL
EOSINOPHIL # BLD AUTO: 0.41 K/UL — SIGNIFICANT CHANGE UP (ref 0–0.5)
EOSINOPHIL NFR BLD AUTO: 3.4 % — SIGNIFICANT CHANGE UP (ref 0–6)
GLUCOSE BLDC GLUCOMTR-MCNC: 127 MG/DL — HIGH (ref 70–99)
GLUCOSE BLDC GLUCOMTR-MCNC: 79 MG/DL — SIGNIFICANT CHANGE UP (ref 70–99)
GLUCOSE SERPL-MCNC: 91 MG/DL — SIGNIFICANT CHANGE UP (ref 70–99)
GLUCOSE UR QL: NEGATIVE — SIGNIFICANT CHANGE UP
HCT VFR BLD CALC: 31.3 % — LOW (ref 39–50)
HCT VFR BLD CALC: 31.5 % — LOW (ref 39–50)
HCT VFR BLD CALC: 33.3 % — LOW (ref 39–50)
HCT VFR BLD CALC: 34.5 % — LOW (ref 39–50)
HGB BLD-MCNC: 10 G/DL — LOW (ref 13–17)
HGB BLD-MCNC: 10 G/DL — LOW (ref 13–17)
HGB BLD-MCNC: 10.6 G/DL — LOW (ref 13–17)
HGB BLD-MCNC: 11 G/DL — LOW (ref 13–17)
IMM GRANULOCYTES NFR BLD AUTO: 0.4 % — SIGNIFICANT CHANGE UP (ref 0–1.5)
INR BLD: 1.03 RATIO — SIGNIFICANT CHANGE UP (ref 0.88–1.16)
KETONES UR-MCNC: NEGATIVE — SIGNIFICANT CHANGE UP
LACTATE SERPL-SCNC: 0.7 MMOL/L — SIGNIFICANT CHANGE UP (ref 0.7–2)
LEUKOCYTE ESTERASE UR-ACNC: NEGATIVE — SIGNIFICANT CHANGE UP
LIDOCAIN IGE QN: 127 U/L — SIGNIFICANT CHANGE UP (ref 73–393)
LYMPHOCYTES # BLD AUTO: 1.27 K/UL — SIGNIFICANT CHANGE UP (ref 1–3.3)
LYMPHOCYTES # BLD AUTO: 10.5 % — LOW (ref 13–44)
MCHC RBC-ENTMCNC: 31.3 PG — SIGNIFICANT CHANGE UP (ref 27–34)
MCHC RBC-ENTMCNC: 31.5 PG — SIGNIFICANT CHANGE UP (ref 27–34)
MCHC RBC-ENTMCNC: 31.7 GM/DL — LOW (ref 32–36)
MCHC RBC-ENTMCNC: 31.7 PG — SIGNIFICANT CHANGE UP (ref 27–34)
MCHC RBC-ENTMCNC: 31.8 GM/DL — LOW (ref 32–36)
MCHC RBC-ENTMCNC: 31.8 PG — SIGNIFICANT CHANGE UP (ref 27–34)
MCHC RBC-ENTMCNC: 31.9 GM/DL — LOW (ref 32–36)
MCHC RBC-ENTMCNC: 31.9 GM/DL — LOW (ref 32–36)
MCV RBC AUTO: 100.3 FL — HIGH (ref 80–100)
MCV RBC AUTO: 98.3 FL — SIGNIFICANT CHANGE UP (ref 80–100)
MCV RBC AUTO: 98.8 FL — SIGNIFICANT CHANGE UP (ref 80–100)
MCV RBC AUTO: 99.4 FL — SIGNIFICANT CHANGE UP (ref 80–100)
MONOCYTES # BLD AUTO: 1.4 K/UL — HIGH (ref 0–0.9)
MONOCYTES NFR BLD AUTO: 11.6 % — SIGNIFICANT CHANGE UP (ref 2–14)
NEUTROPHILS # BLD AUTO: 8.88 K/UL — HIGH (ref 1.8–7.4)
NEUTROPHILS NFR BLD AUTO: 73.8 % — SIGNIFICANT CHANGE UP (ref 43–77)
NITRITE UR-MCNC: NEGATIVE — SIGNIFICANT CHANGE UP
NRBC # BLD: 0 /100 WBCS — SIGNIFICANT CHANGE UP (ref 0–0)
OB PNL STL: POSITIVE
PH UR: 6 — SIGNIFICANT CHANGE UP (ref 5–8)
PLATELET # BLD AUTO: 204 K/UL — SIGNIFICANT CHANGE UP (ref 150–400)
PLATELET # BLD AUTO: 218 K/UL — SIGNIFICANT CHANGE UP (ref 150–400)
PLATELET # BLD AUTO: 219 K/UL — SIGNIFICANT CHANGE UP (ref 150–400)
PLATELET # BLD AUTO: 252 K/UL — SIGNIFICANT CHANGE UP (ref 150–400)
POTASSIUM SERPL-MCNC: 3 MMOL/L — LOW (ref 3.5–5.3)
POTASSIUM SERPL-SCNC: 3 MMOL/L — LOW (ref 3.5–5.3)
PROT SERPL-MCNC: 7.2 G/DL — SIGNIFICANT CHANGE UP (ref 6–8.3)
PROT UR-MCNC: 30 MG/DL
PROTHROM AB SERPL-ACNC: 12.2 SEC — SIGNIFICANT CHANGE UP (ref 10.6–13.6)
RBC # BLD: 3.14 M/UL — LOW (ref 4.2–5.8)
RBC # BLD: 3.15 M/UL — LOW (ref 4.2–5.8)
RBC # BLD: 3.37 M/UL — LOW (ref 4.2–5.8)
RBC # BLD: 3.51 M/UL — LOW (ref 4.2–5.8)
RBC # FLD: 14 % — SIGNIFICANT CHANGE UP (ref 10.3–14.5)
RBC # FLD: 14.1 % — SIGNIFICANT CHANGE UP (ref 10.3–14.5)
RBC # FLD: 14.2 % — SIGNIFICANT CHANGE UP (ref 10.3–14.5)
RBC # FLD: 14.4 % — SIGNIFICANT CHANGE UP (ref 10.3–14.5)
SARS-COV-2 RNA SPEC QL NAA+PROBE: SIGNIFICANT CHANGE UP
SODIUM SERPL-SCNC: 139 MMOL/L — SIGNIFICANT CHANGE UP (ref 135–145)
SP GR SPEC: 1.01 — SIGNIFICANT CHANGE UP (ref 1.01–1.02)
TROPONIN I SERPL-MCNC: 0.05 NG/ML — HIGH (ref 0–0.04)
TROPONIN I SERPL-MCNC: 0.05 NG/ML — HIGH (ref 0–0.04)
UROBILINOGEN FLD QL: NEGATIVE — SIGNIFICANT CHANGE UP
WBC # BLD: 11.37 K/UL — HIGH (ref 3.8–10.5)
WBC # BLD: 12.05 K/UL — HIGH (ref 3.8–10.5)
WBC # BLD: 9.11 K/UL — SIGNIFICANT CHANGE UP (ref 3.8–10.5)
WBC # BLD: 9.32 K/UL — SIGNIFICANT CHANGE UP (ref 3.8–10.5)
WBC # FLD AUTO: 11.37 K/UL — HIGH (ref 3.8–10.5)
WBC # FLD AUTO: 12.05 K/UL — HIGH (ref 3.8–10.5)
WBC # FLD AUTO: 9.11 K/UL — SIGNIFICANT CHANGE UP (ref 3.8–10.5)
WBC # FLD AUTO: 9.32 K/UL — SIGNIFICANT CHANGE UP (ref 3.8–10.5)

## 2020-11-06 PROCEDURE — 99223 1ST HOSP IP/OBS HIGH 75: CPT | Mod: GC

## 2020-11-06 PROCEDURE — 99223 1ST HOSP IP/OBS HIGH 75: CPT | Mod: 25

## 2020-11-06 PROCEDURE — 74018 RADEX ABDOMEN 1 VIEW: CPT | Mod: 26

## 2020-11-06 PROCEDURE — 71250 CT THORAX DX C-: CPT | Mod: 26

## 2020-11-06 PROCEDURE — 71045 X-RAY EXAM CHEST 1 VIEW: CPT | Mod: 26

## 2020-11-06 PROCEDURE — 93010 ELECTROCARDIOGRAM REPORT: CPT | Mod: 76

## 2020-11-06 PROCEDURE — 74176 CT ABD & PELVIS W/O CONTRAST: CPT | Mod: 26

## 2020-11-06 PROCEDURE — 99221 1ST HOSP IP/OBS SF/LOW 40: CPT

## 2020-11-06 RX ORDER — ENTECAVIR 0.5 MG/1
0.5 TABLET ORAL DAILY
Refills: 0 | Status: DISCONTINUED | OUTPATIENT
Start: 2020-11-06 | End: 2020-11-11

## 2020-11-06 RX ORDER — PANTOPRAZOLE SODIUM 20 MG/1
40 TABLET, DELAYED RELEASE ORAL EVERY 12 HOURS
Refills: 0 | Status: DISCONTINUED | OUTPATIENT
Start: 2020-11-06 | End: 2020-11-11

## 2020-11-06 RX ORDER — ATORVASTATIN CALCIUM 80 MG/1
40 TABLET, FILM COATED ORAL AT BEDTIME
Refills: 0 | Status: DISCONTINUED | OUTPATIENT
Start: 2020-11-06 | End: 2020-11-11

## 2020-11-06 RX ORDER — CIPROFLOXACIN LACTATE 400MG/40ML
400 VIAL (ML) INTRAVENOUS EVERY 12 HOURS
Refills: 0 | Status: DISCONTINUED | OUTPATIENT
Start: 2020-11-06 | End: 2020-11-09

## 2020-11-06 RX ORDER — PANTOPRAZOLE SODIUM 20 MG/1
80 TABLET, DELAYED RELEASE ORAL ONCE
Refills: 0 | Status: COMPLETED | OUTPATIENT
Start: 2020-11-06 | End: 2020-11-06

## 2020-11-06 RX ORDER — POTASSIUM CHLORIDE 20 MEQ
40 PACKET (EA) ORAL ONCE
Refills: 0 | Status: COMPLETED | OUTPATIENT
Start: 2020-11-06 | End: 2020-11-06

## 2020-11-06 RX ORDER — RANOLAZINE 500 MG/1
500 TABLET, FILM COATED, EXTENDED RELEASE ORAL
Refills: 0 | Status: DISCONTINUED | OUTPATIENT
Start: 2020-11-06 | End: 2020-11-06

## 2020-11-06 RX ORDER — FERROUS SULFATE 325(65) MG
325 TABLET ORAL DAILY
Refills: 0 | Status: DISCONTINUED | OUTPATIENT
Start: 2020-11-06 | End: 2020-11-11

## 2020-11-06 RX ORDER — METRONIDAZOLE 500 MG
500 TABLET ORAL EVERY 8 HOURS
Refills: 0 | Status: DISCONTINUED | OUTPATIENT
Start: 2020-11-06 | End: 2020-11-09

## 2020-11-06 RX ORDER — SODIUM CHLORIDE 9 MG/ML
1000 INJECTION, SOLUTION INTRAVENOUS
Refills: 0 | Status: DISCONTINUED | OUTPATIENT
Start: 2020-11-06 | End: 2020-11-07

## 2020-11-06 RX ORDER — METOPROLOL TARTRATE 50 MG
50 TABLET ORAL
Refills: 0 | Status: DISCONTINUED | OUTPATIENT
Start: 2020-11-06 | End: 2020-11-11

## 2020-11-06 RX ORDER — SODIUM CHLORIDE 9 MG/ML
1000 INJECTION INTRAMUSCULAR; INTRAVENOUS; SUBCUTANEOUS ONCE
Refills: 0 | Status: COMPLETED | OUTPATIENT
Start: 2020-11-06 | End: 2020-11-06

## 2020-11-06 RX ORDER — ACETAMINOPHEN 500 MG
650 TABLET ORAL EVERY 6 HOURS
Refills: 0 | Status: DISCONTINUED | OUTPATIENT
Start: 2020-11-06 | End: 2020-11-11

## 2020-11-06 RX ORDER — MORPHINE SULFATE 50 MG/1
1 CAPSULE, EXTENDED RELEASE ORAL ONCE
Refills: 0 | Status: DISCONTINUED | OUTPATIENT
Start: 2020-11-06 | End: 2020-11-06

## 2020-11-06 RX ORDER — PANTOPRAZOLE SODIUM 20 MG/1
8 TABLET, DELAYED RELEASE ORAL
Qty: 80 | Refills: 0 | Status: DISCONTINUED | OUTPATIENT
Start: 2020-11-06 | End: 2020-11-06

## 2020-11-06 RX ORDER — TAMSULOSIN HYDROCHLORIDE 0.4 MG/1
0.4 CAPSULE ORAL AT BEDTIME
Refills: 0 | Status: DISCONTINUED | OUTPATIENT
Start: 2020-11-06 | End: 2020-11-11

## 2020-11-06 RX ADMIN — Medication 40 MILLIEQUIVALENT(S): at 11:54

## 2020-11-06 RX ADMIN — PANTOPRAZOLE SODIUM 40 MILLIGRAM(S): 20 TABLET, DELAYED RELEASE ORAL at 17:12

## 2020-11-06 RX ADMIN — MORPHINE SULFATE 1 MILLIGRAM(S): 50 CAPSULE, EXTENDED RELEASE ORAL at 15:21

## 2020-11-06 RX ADMIN — Medication 50 MILLIGRAM(S): at 17:12

## 2020-11-06 RX ADMIN — Medication 325 MILLIGRAM(S): at 14:05

## 2020-11-06 RX ADMIN — PANTOPRAZOLE SODIUM 80 MILLIGRAM(S): 20 TABLET, DELAYED RELEASE ORAL at 06:40

## 2020-11-06 RX ADMIN — PANTOPRAZOLE SODIUM 10 MG/HR: 20 TABLET, DELAYED RELEASE ORAL at 07:41

## 2020-11-06 RX ADMIN — SODIUM CHLORIDE 1000 MILLILITER(S): 9 INJECTION INTRAMUSCULAR; INTRAVENOUS; SUBCUTANEOUS at 06:40

## 2020-11-06 RX ADMIN — TAMSULOSIN HYDROCHLORIDE 0.4 MILLIGRAM(S): 0.4 CAPSULE ORAL at 22:39

## 2020-11-06 RX ADMIN — RANOLAZINE 500 MILLIGRAM(S): 500 TABLET, FILM COATED, EXTENDED RELEASE ORAL at 11:54

## 2020-11-06 RX ADMIN — ATORVASTATIN CALCIUM 40 MILLIGRAM(S): 80 TABLET, FILM COATED ORAL at 22:39

## 2020-11-06 RX ADMIN — ENTECAVIR 0.5 MILLIGRAM(S): 0.5 TABLET ORAL at 14:06

## 2020-11-06 RX ADMIN — Medication 500 MILLIGRAM(S): at 22:39

## 2020-11-06 RX ADMIN — SODIUM CHLORIDE 70 MILLILITER(S): 9 INJECTION, SOLUTION INTRAVENOUS at 14:08

## 2020-11-06 RX ADMIN — MORPHINE SULFATE 1 MILLIGRAM(S): 50 CAPSULE, EXTENDED RELEASE ORAL at 16:48

## 2020-11-06 RX ADMIN — Medication 200 MILLIGRAM(S): at 19:20

## 2020-11-06 NOTE — H&P ADULT - ASSESSMENT
Patient is a 85 year old male with PMHx of CAD on ticagrelor, CHFpEF, HTN, DM presenting with episodes of diarrhea reported to be dark in nature, admitted for possible GI bleed, also noted with troponin elevation.     *** Patient brought home medications that does not seem to include some medications he was on a year ago such as statin, ranolazine, lisinopril, amlodipine

## 2020-11-06 NOTE — CONSULT NOTE ADULT - ASSESSMENT
85 y.o. M with bloody, watery diarrhea r/o vibriosis, incidental acute cholecystitis findings on CT    -Recommend hepatic US or HIDA scan to evaluate gallbladder  -Asymptomatic finding, no acute surgical intervention indicated at present time  -Will follow results  -f/u stool cultures  -Supportive care: PO/IV hydration, replete lytes prn

## 2020-11-06 NOTE — H&P ADULT - HISTORY OF PRESENT ILLNESS
Patient is an 85 year old [place] male, lives at home with wife, ambulates with walker, with PMH of CAD, last cardiac cath @ [facility] 06/2019 w/ PTCA proxLAD ISR, residual proxRCA 30-50% ISR, HTN, DM, CHFpEF presenting with chief complaint of diarrhea. Approximately 3 days prior to admission, patient ate raw oyster and started developing 5-6 episodes of dark watery bowel movements a day associated with some abdominal pain and nausea. Patient also endorsing substernal chest pressure that appears to be chronic angina given patient's extensive cardiac history. No SOB, abd pain resolved, no dizziness, fever, cough, vomiting, dizziness. Patient is AAOx3 however has poor memory, unable to say the last time he had cardiac cath or who is OP physicians are.    Patient has multiple [facility] admissions under MRN [id number]. In the ER, FOBT returned positive and admitted by ER for GI bleed. Patient was noted to have multiple dark green bowel movements in the ED. Hemodynamically stable, hemoglobin at baseline.

## 2020-11-06 NOTE — H&P ADULT - PROBLEM SELECTOR PLAN 3
Extensive cardiac history, last cath 2019?  With chest pain and troponin elevation, will require cardiology evaluation.  - Started on ranolazine which patient has been on in the past, however not currently taking.  - Continue Brilinta, beta blocker, high intensity statin. Patient noted to not be on aspirin.  Cardiology, Dr. Ha consulted Extensive cardiac history, last cath 2019?  With chest pain and troponin elevation, likely demand ischemia in the setting of abdominal pathology.  - Started on ranolazine which patient has been on in the past, however not currently taking.  - Hold Brilinta, continue beta blocker, re-start high intensity statin. Patient noted to not be on aspirin.  - Trend troponins until peak  Cardiology, Dr. Ha consulted  - Repeat echocardiogram

## 2020-11-06 NOTE — H&P ADULT - ATTENDING COMMENTS
Patient is a 85 year old male with PMHx of CAD on ticagrelor, CHFpEF, HTN, DM presenting with multiple episodes of watery diarrhea and epidsoes of black stools , admitted for possible GI bleed. Patient also noted with troponin elevation, reports pressure like chest pain. Initial imaging CT abdomen showed findings suggestive acute cholecystitis.     Vital Signs Last 24 Hrs  T(C): 36.6 (06 Nov 2020 15:30), Max: 36.8 (06 Nov 2020 07:27)  T(F): 97.8 (06 Nov 2020 15:30), Max: 98.3 (06 Nov 2020 07:27)  HR: 76 (06 Nov 2020 15:30) (65 - 76)  BP: 132/61 (06 Nov 2020 15:30) (127/65 - 155/65)  BP(mean): 78 (06 Nov 2020 15:30) (78 - 78)  RR: 17 (06 Nov 2020 15:30) (17 - 20)  SpO2: 98% (06 Nov 2020 15:30) (97% - 99%)                          10.0   9.32  )-----------( 218      ( 06 Nov 2020 15:41 )             31.5     11-06    139  |  103  |  38<H>  ----------------------------<  91  3.0<L>   |  27  |  1.93<H>    Ca    8.7      06 Nov 2020 06:35    TPro  7.2  /  Alb  2.8<L>  /  TBili  0.6  /  DBili  0.1  /  AST  15  /  ALT  13  /  AlkPhos  143<H>  11-06    CARDIAC MARKERS ( 06 Nov 2020 15:41 )  0.053 ng/mL / x     / x     / x     / x      CARDIAC MARKERS ( 06 Nov 2020 10:02 )  0.053 ng/mL / x     / 57 U/L / x     / 2.1 ng/mL      Plan:    Melena and watery diarrhea:  iv hydration, iv cipro flagyl, iv protonix.  stool culture sent, c. diff PCR. contact precautions  GI consulted plan for EGD.  Surgery consulted for findings of cholecystitis, as patient has no RUQ tenderness, no plan for emergent surgery.  HIDA scan ordered as per recommendations    elevated cardiac markers:  likely type 2 cardiac marker leak, trend troponin.  extensive h/o CAD   Dr. Ha on board.  antiplatelet held 2/2 GI bleed    rest of management as above.

## 2020-11-06 NOTE — H&P ADULT - PROBLEM SELECTOR PLAN 7
Echo 2018 noted with normal EF, G1DD  Will hold furosemide for now given no s/s of fluid overload  Follow up CXR  Repeat echo  - Cardio, Dr. Ha Continue tamsulosin

## 2020-11-06 NOTE — CONSULT NOTE ADULT - SUBJECTIVE AND OBJECTIVE BOX
[  ] STAT REQUEST              [ X ] ROUTINE REQUEST    Patient is a 85y old  Male who presents with a chief complaint of Diarrhea (06 Nov 2020 13:46)      HPI:  Patient is an 85 year old male, lives at home with wife, ambulates with walker, with multiple medical problems including CAD, last cardiac cath @ [facility] 06/2019 w/ PTCA proxLAD ISR, residual proxRCA 30-50% ISR, HTN, DM, CHFpEF presenting with diarrhea and black stool associated with intermittent abdominal cramps and nausea.  Patient denies hematemesis, hematochezia, fever, chills, chest pain, SOB, cough, epistaxis, hemoptysis, hematuria, dysuria recent antibiotic use or traveling.        PAIN MANAGEMENT:  Pain Scale:                3-4 /10  Pain Location:  Intermittent diffuse abdominal cramps    Prior Colonoscopy:  No prior colonoscopy    PAST MEDICAL HISTORY  CAD  HTN  CHF  DM    PAST SURGICAL HISTORY  PTCA      Allergies    chlorhexidine topical (Unknown)  contrast media (iodine-based) (Unknown)    Intolerances  None         MEDICATIONS  (STANDING):  atorvastatin 40 milliGRAM(s) Oral at bedtime  ciprofloxacin   IVPB 400 milliGRAM(s) IV Intermittent every 12 hours  dextrose 5% + sodium chloride 0.45%. 1000 milliLiter(s) (70 mL/Hr) IV Continuous <Continuous>  entecavir 0.5 milliGRAM(s) Oral daily  ferrous    sulfate 325 milliGRAM(s) Oral daily  metoprolol tartrate 50 milliGRAM(s) Oral two times a day  metroNIDAZOLE    Tablet 500 milliGRAM(s) Oral every 8 hours  pantoprazole  Injectable 40 milliGRAM(s) IV Push every 12 hours  tamsulosin 0.4 milliGRAM(s) Oral at bedtime    MEDICATIONS  (PRN):  acetaminophen   Tablet .. 650 milliGRAM(s) Oral every 6 hours PRN Mild Pain (1 - 3)      SOCIAL HISTORY  Advanced Directives:       [ X ] Full Code       [  ] DNR  Marital Status:         [ X ] M      [  ] S      [  ] D       [  ] W  Children:       [ X ] Yes      [  ] No  Occupation:        [  ] Employed       [ X ] Unemployed       [  ] Retired  Diet:       [ X ] Regular       [  ] PEG feeding          [  ] NG tube feeding  Drug Use:           [ X ] Patient denied          [  ] Yes  Alcohol:           [ X ] No             [  ] Yes (socially)         [  ] Yes (chronic)  Tobacco:           [  ] Yes           [ X ] No      FAMILY HISTORY  [ X ] Heart Disease            [ X ] Diabetes             [ X ] HTN             [  ] Colon Cancer             [  ] Stomach Cancer              [  ] Pancreatic Cancer      VITAL SIGNS    Vital Signs Last 24 Hrs  T(C): 36.6 (06 Nov 2020 15:30), Max: 36.8 (06 Nov 2020 07:27)  T(F): 97.8 (06 Nov 2020 15:30), Max: 98.3 (06 Nov 2020 07:27)  HR: 76 (06 Nov 2020 15:30) (65 - 76)  BP: 132/61 (06 Nov 2020 15:30) (127/65 - 155/65)  BP(mean): 78 (06 Nov 2020 15:30) (78 - 78)  RR: 17 (06 Nov 2020 15:30) (17 - 20)  SpO2: 98% (06 Nov 2020 15:30) (97% - 99%)  Daily Height in cm: 154.94 (06 Nov 2020 05:58)           CBC Full  -  ( 06 Nov 2020 15:41 )  WBC Count : 9.32 K/uL  RBC Count : 3.14 M/uL  Hemoglobin : 10.0 g/dL  Hematocrit : 31.5 %  Platelet Count - Automated : 218 K/uL  Mean Cell Volume : 100.3 fl  Mean Cell Hemoglobin : 31.8 pg  Mean Cell Hemoglobin Concentration : 31.7 gm/dL  Auto Neutrophil # : x  Auto Lymphocyte # : x  Auto Monocyte # : x  Auto Eosinophil # : x  Auto Basophil # : x  Auto Neutrophil % : x  Auto Lymphocyte % : x  Auto Monocyte % : x  Auto Eosinophil % : x  Auto Basophil % : x      11-06    139  |  103  |  38<H>  ----------------------------<  91  3.0<L>   |  27  |  1.93<H>    Ca    8.7      06 Nov 2020 06:35    TPro  7.2  /  Alb  2.8<L>  /  TBili  0.6  /  DBili  0.1  /  AST  15  /  ALT  13  /  AlkPhos  143<H>  11-06      Lipase, Serum: 127 U/L (11-06 @ 06:35)    PT/INR - ( 06 Nov 2020 06:35 )   PT: 12.2 sec;   INR: 1.03 ratio         PTT - ( 06 Nov 2020 06:35 )  PTT:28.0 sec      Occult Blood, Feces (11.06.20 @ 06:35)   Occult Blood, Feces: Positive     `  Urinalysis (11.06.20 @ 11:28)   Glucose Qualitative, Urine: Negative   Blood, Urine: Trace   pH Urine: 6.0   Color: Yellow   Urine Appearance: Clear   Bilirubin: Negative   Ketone - Urine: Negative   Specific Gravity: 1.010   Protein, Urine: 30 mg/dL   Urobilinogen: Negative   Nitrite: Negative   Leukocyte Esterase Concentration: Negative     ECG  Ventricular Rate 67 BPM    Atrial Rate 67 BPM    P-R Interval 196 ms    QRS Duration 164 ms    Q-T Interval 448 ms    QTC Calculation(Bazett) 473 ms    R Axis -76 degrees    T Axis 65 degrees    Diagnosis Line *** Poor data quality, interpretation may be adversely affected  Normal sinus rhythm  Left axis deviation  Right bundle branch block  Possible Lateral infarct , age undetermined  Abnormal ECG        RADIOLOGY/IMAGING                EXAM:  CT ABDOMEN AND PELVIS                            PROCEDURE DATE:  11/06/2020          INTERPRETATION:  CT of the abdomen and pelvis without IV or oral contrast    Clinical Indication: Abdominal pain    Technique: Axial multidetector CT images of the abdomen and pelvis are acquired without IV or oral contrast.    Comparison: None.    Findings: Limited sections through the lung bases demonstrate small bilateral atelectasis. Right pleural calcifications are present.    Evaluation of the abdomen and pelvis is limited by lack of IV/oral contrast. Gallstones in the gallbladder with pericholecystic stranding, suggestive of acute cholecystitis.    Allowing for the noncontrast technique, the liver, common bile duct, pancreas, spleen, and the right adrenal appear grossly unremarkable. Nonspecific mild left adrenal thickening.    Hypodense lesions in the kidneys bilaterally, representing probable cysts. There is a 1.9 cm slightly hyperdense lesion in the right kidney with a Hounsfield unit of 46. This may represent a hyperdense cyst or a solid malignant neoplasm. There is another similar indeterminate 0.7 cm hyperdense lesion in the lower pole of the right kidney. Small nonobstructive calculi versus renal vascular calcifications are present in the kidneys bilaterally. No evidence for a ureteral calculus. No hydronephrosis.    The appendix appears normal. No bowel obstruction, or grossly thickened bowel wall. Small duodenal diverticulum.    No evidence of free air, ascites, or enlarged lymph node.    Streaky artifact from the right hip replacement and left hip screw limits evaluation of the pelvis. The urinary bladder appears grossly unremarkable.    The prostate is not visualized due to the streaky artifact.    Age indeterminatecompression fractures at L5, L4, L3, L2, L1, T12, and T8 vertebrae.    Impression:    Gallstones in the gallbladder with pericholecystic stranding, suggestive of acute cholecystitis. If clinically indicated, gallbladder ultrasound/HIDA scan may be pursued for further evaluation.    Indeterminate hyperdense lesions in the right kidney. These may represent hyperdense cysts or solid malignant neoplasms. If clinically indicated, abdominal MR without and with IV contrast may be pursued for further evaluation.    Age indeterminate compression fractures at L5, L4, L3, L2, L1, T12, and T8 vertebrae.

## 2020-11-06 NOTE — H&P ADULT - PROBLEM SELECTOR PLAN 8
Continue Entecavir, liver enzymes significant for Alk PHos elevation, AST/ALT wnl Echo 2018 noted with normal EF, G1DD  Will hold furosemide for now given no s/s of fluid overload  Follow up CXR  Repeat echo  - Cardio, Dr. Ha

## 2020-11-06 NOTE — CONSULT NOTE ADULT - NEGATIVE ENMT SYMPTOMS
no nasal congestion/no nose bleeds/no dry mouth/no ear pain/no dysphagia/no vertigo/no nasal discharge/no post-nasal discharge/no gum bleeding/no tinnitus/no throat pain/no hearing difficulty/no sinus symptoms

## 2020-11-06 NOTE — CONSULT NOTE ADULT - ATTENDING COMMENTS
Minimal pericholecystic fluid on CT without any significant inflammation noted. Pt w/o associated abdominal pain and just presenting w/ bloody diarrhea.    abd soft, non-distended, non-tender to palpation    - recommend RUQ US or HIDA scan to better evaluate gallbladder  - recommend work-up for infectious diarrhea especially given the blood  - okay CLD from our perspective  - need for abx pending work-up    Talita Hogan MD  Attending Physician
Thank you for the courtesy of a consultation, please contact me for any additional questions

## 2020-11-06 NOTE — H&P ADULT - PROBLEM SELECTOR PLAN 5
Appears to have baseline creatinine around 1.7, presented with 1.93  Repeat BMP after gentle hydration.  If continues to worsen, would obtain urinelytes, US renal, nephro consult.  Avoid nephrotoxic medications Last A1C 2019 not in diabetic range  Will hold insulin sliding scale, start POCT glucose q6h  Follow up glucose readings and a1c

## 2020-11-06 NOTE — ED ADULT NURSE NOTE - ED STAT RN HANDOFF DETAILS 2
Patient received 7AM, denies pain/discomfort. No active signs of bleeding noted.  services utilized. Patient stable in no acute distress.

## 2020-11-06 NOTE — H&P ADULT - PROBLEM SELECTOR PLAN 9
IMPROVE VTE score: 2  Will manage with: SCD boots    [ ] Previous VTE                                    3  [ ] Thrombophilia                                  2  [ ] Lower limb paralysis                        2  (unable to hold up >15 seconds)    [ ] Current Cancer (within 6 months)        2   [x] Immobilization > 24 hrs                    1  [ ] ICU/CCU stay > 24 hrs                      1  [x] Age > 60                                         1 Last A1C 2019 not in diabetic range  Will hold insulin sliding scale, start POCT glucose ACHS  Follow up glucose readings and a1c Continue Entecavir, liver enzymes significant for Alk PHos elevation, AST/ALT wnl

## 2020-11-06 NOTE — CONSULT NOTE ADULT - ASSESSMENT
1. Anemia  2. Melena  3. Stool positive for occult blood  4. R/o Peptic ulcer disease  5. R/o AVM's  6. R/o Right side colonic bleeding    Suggestions:  1. Monitor H/H  2. Transfuse PRBC as needed  3. Protonix IV  4. NPO  5. Check stool for culture, O&P and C. Diff toxin  6. Monitor Electrolytes  7. EGD  8. Avoid NSAID  9. DVT prophylaxis 1. Anemia  2. Melena  3. Stool positive for occult blood  4. R/o Peptic ulcer disease  5. R/o AVM's  6. R/o Right side colonic bleeding  7. Gall stone R/o cholecystitis    Suggestions:  1. Monitor H/H  2. Transfuse PRBC as needed  3. Protonix IV  4. NPO  5. Check stool for culture, O&P and C. Diff toxin  6. Monitor Electrolytes  7. Antibiotics IV  8. EGD  9. HIDA Scan  10. Surgical evaluation  11. IVF hydration  12. Avoid NSAID  13. DVT prophylaxis

## 2020-11-06 NOTE — CONSULT NOTE ADULT - NEGATIVE GENERAL GENITOURINARY SYMPTOMS
no flank pain R/no urine discoloration/no renal colic/no incontinence/no gas in urine/no hematuria/no flank pain L/no bladder infections/no dysuria

## 2020-11-06 NOTE — H&P ADULT - PROBLEM SELECTOR PLAN 1
Low clinical concerns for GI bleed: and although FOBT positive it is a poor indicator of GI bleed. Despite 3 days of dark diarrhea, patient's hgb remains stable, hemodynamically stable. Patient does not know if he has had a colonoscopy before.  - Obtain orthostatic BP readings  - Dr. EVERARDO _________ consulted  - Until GI evaluation, will hold on antiplatelet agents  - Started on IV protonix BID, clear liquid diet.   - Will trend CBC q6h until hgb noted to be stable. With persistent dark diarrhea, hemodynamically stable. Patient does not know if he has had a EGD/colonoscopy before.  - Obtain orthostatic BP readings  - GI, Dr. Edwards consulted  - Started on IV protonix BID, NPO,  EGD as per GI  - Will trend CBC q8h until hgb noted to be stable.  CT abd also revealed acute sameer, surgery consulted, HIDA ordered  - blood transfusion consent in chart

## 2020-11-06 NOTE — CONSULT NOTE ADULT - SUBJECTIVE AND OBJECTIVE BOX
Patient is a 85y old  Male who presents with a chief complaint of Diarrhea (2020 16:53)      HPI  Called see and eval 85y.o. Male w/PMH significant for CAD, last cardiac cath @ [facility] 2019 w/ PTCA proxLAD ISR, residual proxRCA 30-50% ISR, HTN, DM, CHFpEF for incidental acute cholecystitis. Pt presented to [facility] ER today for diarrhea with blood in stool. Pt admits to eating oysters 3 days ago and has been having dark bloody watery stool. CT abd/pelvis obtained and showed incidental pericholecystic fluid and gallstones. Pt states he has morning and preprandial nausea for about 5 months. Tolerates PO diet without pain. Unaware of gallstone in the past.     PAST MEDICAL & SURGICAL HISTORY:  As above    MEDICATIONS  (STANDING):  atorvastatin 40 milliGRAM(s) Oral at bedtime  ciprofloxacin   IVPB 400 milliGRAM(s) IV Intermittent every 12 hours  dextrose 5% + sodium chloride 0.45%. 1000 milliLiter(s) (70 mL/Hr) IV Continuous <Continuous>  entecavir 0.5 milliGRAM(s) Oral daily  ferrous    sulfate 325 milliGRAM(s) Oral daily  metoprolol tartrate 50 milliGRAM(s) Oral two times a day  metroNIDAZOLE    Tablet 500 milliGRAM(s) Oral every 8 hours  pantoprazole  Injectable 40 milliGRAM(s) IV Push every 12 hours  tamsulosin 0.4 milliGRAM(s) Oral at bedtime    MEDICATIONS  (PRN):  acetaminophen   Tablet .. 650 milliGRAM(s) Oral every 6 hours PRN Mild Pain (1 - 3)      Allergies    chlorhexidine topical (Unknown)  contrast media (iodine-based) (Unknown)    Vital Signs Last 24 Hrs  T(C): 36.6 (2020 15:30), Max: 36.8 (2020 07:27)  T(F): 97.8 (2020 15:30), Max: 98.3 (2020 07:27)  HR: 76 (2020 15:30) (65 - 76)  BP: 132/61 (2020 15:30) (127/65 - 155/65)  BP(mean): 78 (2020 15:30) (78 - 78)  RR: 17 (2020 15:30) (17 - 20)  SpO2: 98% (2020 15:30) (97% - 99%)    Physical:  Gen: A&Ox3. NAD  Abd: Soft ND, NT. Negative Zaidi's sign. Small 1cm nodule at RLQ without skin discoloration, tenderness.     LABS:                        10.0   9.32  )-----------( 218      ( 2020 15:41 )             31.5              11-    139  |  103  |  38<H>  ----------------------------<  91  3.0<L>   |  27  |  1.93<H>    Ca    8.7      2020 06:35    TPro  7.2  /  Alb  2.8<L>  /  TBili  0.6  /  DBili  0.1  /  AST  15  /  ALT  13  /  AlkPhos  143<H>  11-06            PT/INR - ( 2020 06:35 )   PT: 12.2 sec;   INR: 1.03 ratio         PTT - ( 2020 06:35 )  PTT:28.0 sec  Urinalysis Basic - ( 2020 11:28 )    Color: Yellow / Appearance: Clear / S.010 / pH: x  Gluc: x / Ketone: Negative  / Bili: Negative / Urobili: Negative   Blood: x / Protein: 30 mg/dL / Nitrite: Negative   Leuk Esterase: Negative / RBC: 0-2 /HPF / WBC 0-2 /HPF   Sq Epi: x / Non Sq Epi: x / Bacteria: Few /HPF    RADIOLOGY & ADDITIONAL STUDIES:  < from: CT Abdomen and Pelvis No Cont (20 @ 14:45) >  Impression:    Gallstones in the gallbladder with pericholecystic stranding, suggestive of acute cholecystitis. If clinically indicated, gallbladder ultrasound/HIDA scan may be pursued for further evaluation.    < end of copied text >

## 2020-11-06 NOTE — CONSULT NOTE ADULT - GASTROINTESTINAL DETAILS
soft/no rebound tenderness/no rigidity/no distention/bowel sounds normal/no masses palpable/nontender/no guarding

## 2020-11-06 NOTE — CONSULT NOTE ADULT - NEGATIVE PSYCHIATRIC SYMPTOMS
no memory loss/no mood swings/no agitation/no suicidal ideation/no anxiety/no insomnia/no paranoia/no depression

## 2020-11-06 NOTE — H&P ADULT - PROBLEM SELECTOR PLAN 6
Continue tamsulosin Appears to have baseline creatinine around 1.7, presented with 1.93  Repeat BMP after gentle hydration.  If continues to worsen, would obtain urinelytes, US renal, nephro consult.  Avoid nephrotoxic medications

## 2020-11-06 NOTE — CONSULT NOTE ADULT - ASSESSMENT
86 yo Yakut-speaking M with DM, HTN, HLD, HFpEF, and CAD with pLAD in-stent restenosis s/p atherectomy @ [facility] 06/2019 who presented in setting of possible GI bleed.     1. Borderline elevated cardiac enzymes: May represent type II MI (demand ischemia) in setting of anemia as well as RITESH  -No suspicion for ACS  -Resume home medications including statin, beta blocker, and anticoagulants once stable with respect to GI bleed  -Reasonable to repeat echocardiogram to reassess LVEF    2. HTN: On metoprolol, adequately controlled  -Patient would also benefit from ACEi in setting of DM, however this can be started later once his baseline creatinine is ascertained    3. HLD: On rosuvastatin  -Check lipid panel    ***Note that this is a preliminary note and any recommendations should NOT be carried out until this note is finalized. *** 86 yo Lithuanian-speaking M with DM, HTN, HLD, HFpEF, and CAD with pLAD in-stent restenosis s/p atherectomy @ [facility] 06/2019 who presented in setting of possible GI bleed.     1. Borderline elevated cardiac enzymes: May represent type II MI (demand ischemia) in setting of anemia as well as RITESH  -No suspicion for ACS  -Resume home medications including statin, beta blocker, and anticoagulants once stable with respect to GI bleed  -Reasonable to repeat echocardiogram to reassess LVEF    2. HTN: On metoprolol, adequately controlled  -Patient would also benefit from ACEi in setting of DM, however this can be started later once his baseline creatinine is ascertained    3. HLD: On rosuvastatin  -Check lipid panel    4. Preprocedural evaluation prior to EGD: No clinical suspicion for ACS; patient is not in decompensated HF and there is no unstable arrhythmia. Unless critical valve lesion is detected on echo (unlikely given physical exam), there is no contraindications from cardiac perspective for patient undergoing EGD. 84 yo Syriac-speaking M with DM, HTN, HLD, HFpEF, and CAD with pLAD in-stent restenosis s/p atherectomy @ [facility] 06/2019 who presented in setting of possible GI bleed.     1. Borderline elevated cardiac enzymes: May represent type II MI (demand ischemia) in setting of anemia as well as RITESH  -No suspicion for ACS  -Likely no further targets for revascularization based on most recent cath  -Resume home medications including statin, beta blocker, and anticoagulants once stable with respect to GI bleed  -Agree with ranolazine for chronic anginal relief  -Reasonable to repeat echocardiogram to reassess LVEF    2. HTN: On metoprolol, adequately controlled  -Patient would also benefit from ACEi in setting of DM, however this can be started later once his baseline creatinine is ascertained and as hemodynamically tolerated    3. HLD: On rosuvastatin  -Check lipid panel    4. Preprocedural evaluation prior to EGD: No clinical suspicion for ACS; patient is not in decompensated HF and there is no unstable arrhythmia. Unless critical valve lesion is detected on echo (unlikely given physical exam), there is no contraindications from cardiac perspective for patient undergoing EGD.

## 2020-11-06 NOTE — ED PROVIDER NOTE - CLINICAL SUMMARY MEDICAL DECISION MAKING FREE TEXT BOX
85 year old male with dark stool. vitals WNL. PE as above. 85 year old male with dark stool. vitals WNL. PE as above.  guaiac+. labs at baseline. given protonix. will admit for GI bleeding.

## 2020-11-06 NOTE — H&P ADULT - PROBLEM SELECTOR PLAN 2
Pw 3 day hx of diarrhea 5-6 episodes, is dark and watery. Noted to be green. Suspect infectious cause as diarrhea started after ingestion of raw seafood.  - Follow up stool studies including C. Diff, GI PCR, stool culture, stool O+P Pw 3 day hx of diarrhea 5-6 episodes, is dark and watery. Noted to be green early on admission and then became block. Rule out infectious cause as diarrhea started after ingestion of raw seafood.  - Follow up stool studies including C. Diff, GI PCR, stool culture, stool O+P  - Rectal tube for persistent diarrhea  - Started gentle hydration

## 2020-11-06 NOTE — CONSULT NOTE ADULT - NEGATIVE OPHTHALMOLOGIC SYMPTOMS
no lacrimation L/no lacrimation R/no discharge R/no photophobia/no blurred vision R/no diplopia/no discharge L/no pain L/no irritation R/no blurred vision L/no pain R/no irritation L/no scleral injection L/no scleral injection R

## 2020-11-06 NOTE — H&P ADULT - PROBLEM SELECTOR PLAN 10
IMPROVE VTE score: 2  Will manage with: SCD boots    [ ] Previous VTE                                    3  [ ] Thrombophilia                                  2  [ ] Lower limb paralysis                        2  (unable to hold up >15 seconds)    [ ] Current Cancer (within 6 months)        2   [x] Immobilization > 24 hrs                    1  [ ] ICU/CCU stay > 24 hrs                      1  [x] Age > 60                                         1

## 2020-11-06 NOTE — H&P ADULT - PROBLEM SELECTOR PLAN 4
Noted to be on amlodipine and lisinopril 2019 that patient is no longer taking. Continue with metoprolol, hold furosemide  Continue to monitor BP  BP appears adequate for age at time of admission Noted to be on amlodipine and lisinopril 2019 that patient is no longer taking. Continue with metoprolol, hold furosemide  Continue to monitor BP  BP normotensive

## 2020-11-06 NOTE — H&P ADULT - NSHPPHYSICALEXAM_GEN_ALL_CORE
General - NAD, laying in bed, elderly, well nourished  Eyes - PERRLA, EOM intact  ENT -  Moist mucous membranes. Conjunctivae appear well perfused.  Cardiovascular - +s1/s2 regular. + soft murmur  Lungs - Clear to ascultation, no use of accessory muscles, no crackles or wheezes.  Skin - No rashes, skin warm and dry, no erythematous areas  Abdomen - Normal bowel sounds, abdomen soft and NONTENDER  GI: Noted to have fecal incontinence, stool on pad appeared green  Extremities - No edema, cyanosis or clubbing  Neurological – Alert and oriented x 3, No focal deficits

## 2020-11-06 NOTE — CONSULT NOTE ADULT - NEGATIVE NEUROLOGICAL SYMPTOMS
no syncope/no difficulty walking/no tremors/no vertigo/no loss of sensation/no loss of consciousness/no headache

## 2020-11-06 NOTE — ED PROVIDER NOTE - OBJECTIVE STATEMENT
85 year old male Upper sorbian speaking, from home, PMH CAD with stent (placed 10 years ago an on brilinta), BPH, HTN, hepB, on lasix BIBA for lower abd pain and black colored diarrhea for the past 2 days. states never had this before. denies dizziness, cp, sob, palpitations, cough, fevers, chills, sweats, back pains, urinary complaints.

## 2020-11-06 NOTE — CONSULT NOTE ADULT - SUBJECTIVE AND OBJECTIVE BOX
CHIEF COMPLAINT: Diarrhea    HPI: 86 yo Indonesian-speaking M with DM, HTN, HLD, HFpEF, and CAD s/p PCI (last POBA to pLAD in-stent restenosis @ [facility] 06/2019) who presented after multiple episodes of diarrhea. Patient reports    PAST MEDICAL & SURGICAL HISTORY:      Allergies    chlorhexidine topical (Unknown)  contrast media (iodine-based) (Unknown)    MEDICATIONS  (STANDING):  atorvastatin 40 milliGRAM(s) Oral at bedtime  dextrose 5% + sodium chloride 0.45%. 1000 milliLiter(s) (70 mL/Hr) IV Continuous <Continuous>  entecavir 0.5 milliGRAM(s) Oral daily  ferrous    sulfate 325 milliGRAM(s) Oral daily  metoprolol tartrate 50 milliGRAM(s) Oral two times a day  pantoprazole  Injectable 40 milliGRAM(s) IV Push every 12 hours  ranolazine 500 milliGRAM(s) Oral two times a day  tamsulosin 0.4 milliGRAM(s) Oral at bedtime    MEDICATIONS  (PRN):  acetaminophen   Tablet .. 650 milliGRAM(s) Oral every 6 hours PRN Mild Pain (1 - 3)      FAMILY HISTORY:    No family history of premature coronary artery disease or sudden cardiac death    SOCIAL HISTORY:  Smoking-  Alcohol-  Illicit Drug use-    REVIEW OF SYSTEMS:  Constitutional: [ ] fever, [ ]weight loss,  [ ]fatigue  Eyes: [ ] visual changes  Respiratory: [ ]shortness of breath;  [ ] cough, [ ]wheezing, [ ]chills, [ ]hemoptysis  Cardiovascular: [ ] chest pain, [ ]palpitations, [ ]dizziness,  [ ]leg swelling [ ]syncope  Gastrointestinal: [ ] abdominal pain, [ ]nausea, [ ]vomiting,  [ ]diarrhea   Genitourinary: [ ] dysuria, [ ] hematuria  Neurologic: [ ] headaches [ ] tremors  [ ] weakness [ ] lightheadedness  Skin: [ ] itching, [ ]burning, [ ] rashes  Endocrine: [ ] heat or cold intolerance  Musculoskeletal: [ ] joint pain or swelling; [ ] muscle, back, or extremity pain  Psychiatric: [ ] depression, [ ]anxiety, [ ]mood swings, or [ ]difficulty sleeping  Hematologic: [ ] easy bruising, [ ] bleeding gums       [ x] All others negative	  [ ] Unable to obtain    Vital Signs Last 24 Hrs  T(C): 36.4 (06 Nov 2020 11:10), Max: 36.8 (06 Nov 2020 07:27)  T(F): 97.6 (06 Nov 2020 11:10), Max: 98.3 (06 Nov 2020 07:27)  HR: 71 (06 Nov 2020 11:10) (65 - 75)  BP: 127/65 (06 Nov 2020 11:10) (127/65 - 155/65)  BP(mean): --  RR: 17 (06 Nov 2020 11:10) (17 - 20)  SpO2: 99% (06 Nov 2020 11:10) (97% - 99%)  I&O's Summary      PHYSICAL EXAM:  General: No acute distress  HEENT: EOMI, PERRL  Neck: Supple, No JVD  Lungs: Clear to auscultation bilaterally; No rales or wheezing  Heart: Regular rate and rhythm; No murmurs, rubs, or gallops  Abdomen: Nontender, bowel sounds present  Extremities: No clubbing, cyanosis, or edema  Nervous system:  Alert & Oriented X3, no focal deficits  Psychiatric: Normal affect  Skin: No rashes or lesions      LABS:  11-06    139  |  103  |  38<H>  ----------------------------<  91  3.0<L>   |  27  |  1.93<H>    Ca    8.7      06 Nov 2020 06:35    TPro  7.2  /  Alb  2.8<L>  /  TBili  0.6  /  DBili  0.1  /  AST  15  /  ALT  13  /  AlkPhos  143<H>  11-06    Creatinine Trend: 1.93<--                        10.6   11.37 )-----------( 219      ( 06 Nov 2020 10:02 )             33.3     PT/INR - ( 06 Nov 2020 06:35 )   PT: 12.2 sec;   INR: 1.03 ratio         PTT - ( 06 Nov 2020 06:35 )  PTT:28.0 sec    Lipid Panel:   Cardiac Enzymes: CARDIAC MARKERS ( 06 Nov 2020 10:02 )  0.053 ng/mL / x     / 57 U/L / x     / 2.1 ng/mL    RADIOLOGY: none    ECG [my interpretation]:   < from: 12 Lead ECG (11.06.20 @ 06:11) >  Normal sinus rhythm  Left axis deviation  Right bundle branch block  Possible Lateral infarct , age undetermined  Abnormal ECG    Confirmed by HAILY PALMER ([id number]) on 11/6/2020 1:52:15 PM    < end of copied text >    TELEMETRY:    ECHO: Pending     CHIEF COMPLAINT: Diarrhea    HPI: 86 yo Maori-speaking M with DM, HTN, HLD, HFpEF, and CAD s/p PCI (last cath with pLAD in-stent restenosis s/p atherectomy @ [facility] 06/2019, though the official report states "patent stents") who presented after multiple episodes of diarrhea. Patient reports    PAST MEDICAL & SURGICAL HISTORY:      Allergies    chlorhexidine topical (Unknown)  contrast media (iodine-based) (Unknown)    MEDICATIONS  (STANDING):  atorvastatin 40 milliGRAM(s) Oral at bedtime  dextrose 5% + sodium chloride 0.45%. 1000 milliLiter(s) (70 mL/Hr) IV Continuous <Continuous>  entecavir 0.5 milliGRAM(s) Oral daily  ferrous    sulfate 325 milliGRAM(s) Oral daily  metoprolol tartrate 50 milliGRAM(s) Oral two times a day  pantoprazole  Injectable 40 milliGRAM(s) IV Push every 12 hours  ranolazine 500 milliGRAM(s) Oral two times a day  tamsulosin 0.4 milliGRAM(s) Oral at bedtime    MEDICATIONS  (PRN):  acetaminophen   Tablet .. 650 milliGRAM(s) Oral every 6 hours PRN Mild Pain (1 - 3)      FAMILY HISTORY:    No family history of premature coronary artery disease or sudden cardiac death    SOCIAL HISTORY:  Smoking-  Alcohol-  Illicit Drug use-    REVIEW OF SYSTEMS:  Constitutional: [ ] fever, [ ]weight loss,  [ ]fatigue  Eyes: [ ] visual changes  Respiratory: [ ]shortness of breath;  [ ] cough, [ ]wheezing, [ ]chills, [ ]hemoptysis  Cardiovascular: [ ] chest pain, [ ]palpitations, [ ]dizziness,  [ ]leg swelling [ ]syncope  Gastrointestinal: [ ] abdominal pain, [ ]nausea, [ ]vomiting,  [ ]diarrhea   Genitourinary: [ ] dysuria, [ ] hematuria  Neurologic: [ ] headaches [ ] tremors  [ ] weakness [ ] lightheadedness  Skin: [ ] itching, [ ]burning, [ ] rashes  Endocrine: [ ] heat or cold intolerance  Musculoskeletal: [ ] joint pain or swelling; [ ] muscle, back, or extremity pain  Psychiatric: [ ] depression, [ ]anxiety, [ ]mood swings, or [ ]difficulty sleeping  Hematologic: [ ] easy bruising, [ ] bleeding gums       [ x] All others negative	  [ ] Unable to obtain    Vital Signs Last 24 Hrs  T(C): 36.4 (06 Nov 2020 11:10), Max: 36.8 (06 Nov 2020 07:27)  T(F): 97.6 (06 Nov 2020 11:10), Max: 98.3 (06 Nov 2020 07:27)  HR: 71 (06 Nov 2020 11:10) (65 - 75)  BP: 127/65 (06 Nov 2020 11:10) (127/65 - 155/65)  BP(mean): --  RR: 17 (06 Nov 2020 11:10) (17 - 20)  SpO2: 99% (06 Nov 2020 11:10) (97% - 99%)  I&O's Summary      PHYSICAL EXAM:  General: No acute distress  HEENT: EOMI, PERRL  Neck: Supple, No JVD  Lungs: Clear to auscultation bilaterally; No rales or wheezing  Heart: Regular rate and rhythm; No murmurs, rubs, or gallops  Abdomen: Nontender, bowel sounds present  Extremities: No clubbing, cyanosis, or edema  Nervous system:  Alert & Oriented X3, no focal deficits  Psychiatric: Normal affect  Skin: No rashes or lesions      LABS:  11-06    139  |  103  |  38<H>  ----------------------------<  91  3.0<L>   |  27  |  1.93<H>    Ca    8.7      06 Nov 2020 06:35    TPro  7.2  /  Alb  2.8<L>  /  TBili  0.6  /  DBili  0.1  /  AST  15  /  ALT  13  /  AlkPhos  143<H>  11-06    Creatinine Trend: 1.93<--                        10.6   11.37 )-----------( 219      ( 06 Nov 2020 10:02 )             33.3     PT/INR - ( 06 Nov 2020 06:35 )   PT: 12.2 sec;   INR: 1.03 ratio         PTT - ( 06 Nov 2020 06:35 )  PTT:28.0 sec    Lipid Panel:   Cardiac Enzymes: CARDIAC MARKERS ( 06 Nov 2020 10:02 )  0.053 ng/mL / x     / 57 U/L / x     / 2.1 ng/mL    RADIOLOGY: none    ECG [my interpretation]:   < from: 12 Lead ECG (11.06.20 @ 06:11) >  Normal sinus rhythm  Left axis deviation  Right bundle branch block  Possible Lateral infarct , age undetermined  Abnormal ECG    Confirmed by HAILY PALMER ([id number]) on 11/6/2020 1:52:15 PM    < end of copied text >    TELEMETRY:    ECHO: Pending     CHIEF COMPLAINT: Diarrhea    NOTE: HPI obtained via Telugu  Levy #[id number]    HPI: 86 yo Telugu-speaking M with DM, HTN, HLD, HFpEF, and CAD s/p PCI (last cath with pLAD in-stent restenosis s/p atherectomy @ [facility] 06/2019, though the official report states "patent stents") who presented after multiple episodes of diarrhea. Patient reports he has some midsternal chest pain, pressure-like in nature, non-radiating, which has been present for many months. Patient has some dyspnea at baseline, but notes it has been more pronounced recently. He follows with his cardiologist, last saw    PAST MEDICAL & SURGICAL HISTORY:      Allergies    chlorhexidine topical (Unknown)  contrast media (iodine-based) (Unknown)    MEDICATIONS  (STANDING):  atorvastatin 40 milliGRAM(s) Oral at bedtime  dextrose 5% + sodium chloride 0.45%. 1000 milliLiter(s) (70 mL/Hr) IV Continuous <Continuous>  entecavir 0.5 milliGRAM(s) Oral daily  ferrous    sulfate 325 milliGRAM(s) Oral daily  metoprolol tartrate 50 milliGRAM(s) Oral two times a day  pantoprazole  Injectable 40 milliGRAM(s) IV Push every 12 hours  ranolazine 500 milliGRAM(s) Oral two times a day  tamsulosin 0.4 milliGRAM(s) Oral at bedtime    MEDICATIONS  (PRN):  acetaminophen   Tablet .. 650 milliGRAM(s) Oral every 6 hours PRN Mild Pain (1 - 3)      FAMILY HISTORY:    No family history of premature coronary artery disease or sudden cardiac death    SOCIAL HISTORY:  Smoking-  Alcohol-  Illicit Drug use-    REVIEW OF SYSTEMS:  Constitutional: [ ] fever, [ ]weight loss,  [ ]fatigue  Eyes: [ ] visual changes  Respiratory: [ ]shortness of breath;  [ ] cough, [ ]wheezing, [ ]chills, [ ]hemoptysis  Cardiovascular: [ ] chest pain, [ ]palpitations, [ ]dizziness,  [ ]leg swelling [ ]syncope  Gastrointestinal: [ ] abdominal pain, [ ]nausea, [ ]vomiting,  [ ]diarrhea   Genitourinary: [ ] dysuria, [ ] hematuria  Neurologic: [ ] headaches [ ] tremors  [ ] weakness [ ] lightheadedness  Skin: [ ] itching, [ ]burning, [ ] rashes  Endocrine: [ ] heat or cold intolerance  Musculoskeletal: [ ] joint pain or swelling; [ ] muscle, back, or extremity pain  Psychiatric: [ ] depression, [ ]anxiety, [ ]mood swings, or [ ]difficulty sleeping  Hematologic: [ ] easy bruising, [ ] bleeding gums       [ x] All others negative	  [ ] Unable to obtain    Vital Signs Last 24 Hrs  T(C): 36.4 (06 Nov 2020 11:10), Max: 36.8 (06 Nov 2020 07:27)  T(F): 97.6 (06 Nov 2020 11:10), Max: 98.3 (06 Nov 2020 07:27)  HR: 71 (06 Nov 2020 11:10) (65 - 75)  BP: 127/65 (06 Nov 2020 11:10) (127/65 - 155/65)  BP(mean): --  RR: 17 (06 Nov 2020 11:10) (17 - 20)  SpO2: 99% (06 Nov 2020 11:10) (97% - 99%)  I&O's Summary      PHYSICAL EXAM:  General: No acute distress  HEENT: EOMI, PERRL  Neck: Supple, No JVD  Lungs: Clear to auscultation bilaterally; No rales or wheezing  Heart: Regular rate and rhythm; No murmurs, rubs, or gallops  Abdomen: Nontender, bowel sounds present  Extremities: No clubbing, cyanosis, or edema  Nervous system:  Alert & Oriented X3, no focal deficits  Psychiatric: Normal affect  Skin: No rashes or lesions      LABS:  11-06    139  |  103  |  38<H>  ----------------------------<  91  3.0<L>   |  27  |  1.93<H>    Ca    8.7      06 Nov 2020 06:35    TPro  7.2  /  Alb  2.8<L>  /  TBili  0.6  /  DBili  0.1  /  AST  15  /  ALT  13  /  AlkPhos  143<H>  11-06    Creatinine Trend: 1.93<--                        10.6   11.37 )-----------( 219      ( 06 Nov 2020 10:02 )             33.3     PT/INR - ( 06 Nov 2020 06:35 )   PT: 12.2 sec;   INR: 1.03 ratio         PTT - ( 06 Nov 2020 06:35 )  PTT:28.0 sec    Lipid Panel:   Cardiac Enzymes: CARDIAC MARKERS ( 06 Nov 2020 10:02 )  0.053 ng/mL / x     / 57 U/L / x     / 2.1 ng/mL    RADIOLOGY: none    ECG [my interpretation]:   < from: 12 Lead ECG (11.06.20 @ 06:11) >  Normal sinus rhythm  Left axis deviation  Right bundle branch block  Possible Lateral infarct , age undetermined  Abnormal ECG    Confirmed by HAILY PALMER ([id number]) on 11/6/2020 1:52:15 PM    < end of copied text >    TELEMETRY:    ECHO: Pending     CHIEF COMPLAINT: Diarrhea    NOTE: HPI obtained via Yi  Levy #[id number]    HPI: 86 yo Yi-speaking M with DM, HTN, HLD, HFpEF, and CAD s/p PCI (last cath with pLAD in-stent restenosis s/p atherectomy @ [facility] 06/2019, though the official report states "patent stents") who presented after multiple episodes of diarrhea. Patient reports he has some midsternal chest pain, pressure-like in nature, non-radiating, which has been present for many months. Patient has some dyspnea at baseline, but notes it has been more pronounced recently. He follows with his cardiologist, last saw several weeks ago.     PAST MEDICAL & SURGICAL HISTORY:      Allergies    chlorhexidine topical (Unknown)  contrast media (iodine-based) (Unknown)    MEDICATIONS  (STANDING):  atorvastatin 40 milliGRAM(s) Oral at bedtime  dextrose 5% + sodium chloride 0.45%. 1000 milliLiter(s) (70 mL/Hr) IV Continuous <Continuous>  entecavir 0.5 milliGRAM(s) Oral daily  ferrous    sulfate 325 milliGRAM(s) Oral daily  metoprolol tartrate 50 milliGRAM(s) Oral two times a day  pantoprazole  Injectable 40 milliGRAM(s) IV Push every 12 hours  ranolazine 500 milliGRAM(s) Oral two times a day  tamsulosin 0.4 milliGRAM(s) Oral at bedtime    MEDICATIONS  (PRN):  acetaminophen   Tablet .. 650 milliGRAM(s) Oral every 6 hours PRN Mild Pain (1 - 3)      FAMILY HISTORY:    No family history of premature coronary artery disease or sudden cardiac death    SOCIAL HISTORY:  Smoking-  Alcohol-  Illicit Drug use-    REVIEW OF SYSTEMS:  Constitutional: [ ] fever, [ ]weight loss,  [ ]fatigue  Eyes: [ ] visual changes  Respiratory: [ ]shortness of breath;  [ ] cough, [ ]wheezing, [ ]chills, [ ]hemoptysis  Cardiovascular: [ ] chest pain, [ ]palpitations, [ ]dizziness,  [ ]leg swelling [ ]syncope  Gastrointestinal: [ ] abdominal pain, [ ]nausea, [ ]vomiting,  [ ]diarrhea   Genitourinary: [ ] dysuria, [ ] hematuria  Neurologic: [ ] headaches [ ] tremors  [ ] weakness [ ] lightheadedness  Skin: [ ] itching, [ ]burning, [ ] rashes  Endocrine: [ ] heat or cold intolerance  Musculoskeletal: [ ] joint pain or swelling; [ ] muscle, back, or extremity pain  Psychiatric: [ ] depression, [ ]anxiety, [ ]mood swings, or [ ]difficulty sleeping  Hematologic: [ ] easy bruising, [ ] bleeding gums       [ x] All others negative	  [ ] Unable to obtain    Vital Signs Last 24 Hrs  T(C): 36.4 (06 Nov 2020 11:10), Max: 36.8 (06 Nov 2020 07:27)  T(F): 97.6 (06 Nov 2020 11:10), Max: 98.3 (06 Nov 2020 07:27)  HR: 71 (06 Nov 2020 11:10) (65 - 75)  BP: 127/65 (06 Nov 2020 11:10) (127/65 - 155/65)  BP(mean): --  RR: 17 (06 Nov 2020 11:10) (17 - 20)  SpO2: 99% (06 Nov 2020 11:10) (97% - 99%)  I&O's Summary      PHYSICAL EXAM:  General: No acute distress  HEENT: EOMI, PERRL  Neck: Supple, No JVD  Lungs: Clear to auscultation bilaterally; No rales or wheezing  Heart: Regular rate and rhythm; No murmurs, rubs, or gallops  Abdomen: Nontender, bowel sounds present  Extremities: No clubbing, cyanosis, or edema  Nervous system:  Alert & Oriented X3, no focal deficits  Psychiatric: Normal affect  Skin: No rashes or lesions      LABS:  11-06    139  |  103  |  38<H>  ----------------------------<  91  3.0<L>   |  27  |  1.93<H>    Ca    8.7      06 Nov 2020 06:35    TPro  7.2  /  Alb  2.8<L>  /  TBili  0.6  /  DBili  0.1  /  AST  15  /  ALT  13  /  AlkPhos  143<H>  11-06    Creatinine Trend: 1.93<--                        10.6   11.37 )-----------( 219      ( 06 Nov 2020 10:02 )             33.3     PT/INR - ( 06 Nov 2020 06:35 )   PT: 12.2 sec;   INR: 1.03 ratio         PTT - ( 06 Nov 2020 06:35 )  PTT:28.0 sec    Lipid Panel:   Cardiac Enzymes: CARDIAC MARKERS ( 06 Nov 2020 10:02 )  0.053 ng/mL / x     / 57 U/L / x     / 2.1 ng/mL    RADIOLOGY: none    ECG [my interpretation]:   < from: 12 Lead ECG (11.06.20 @ 06:11) >  Normal sinus rhythm  Left axis deviation  Right bundle branch block  Possible Lateral infarct , age undetermined  Abnormal ECG    Confirmed by HAILY PALMER ([id number]) on 11/6/2020 1:52:15 PM    < end of copied text >    TELEMETRY:    ECHO: Pending     CHIEF COMPLAINT: Diarrhea    NOTE: HPI obtained via Slovak  Levy #[id number]    HPI: 86 yo Slovak-speaking M with DM, HTN, HLD, HFpEF, and CAD s/p PCI (last cath with pLAD in-stent restenosis s/p atherectomy @ [facility] 06/2019, though the official report states "patent stents") who presented after multiple episodes of diarrhea. Patient reports he has some midsternal chest pain, pressure-like in nature, non-radiating, which has been present for many months. Patient has some dyspnea at baseline, but notes it has been more pronounced recently. He follows with his cardiologist, last saw several weeks ago. Patient mentioned these anginal complaints to cardiologist in the past, and was started on antianginal medication including ranolazine.     PAST MEDICAL & SURGICAL HISTORY:      Allergies    chlorhexidine topical (Unknown)  contrast media (iodine-based) (Unknown)    MEDICATIONS  (STANDING):  atorvastatin 40 milliGRAM(s) Oral at bedtime  dextrose 5% + sodium chloride 0.45%. 1000 milliLiter(s) (70 mL/Hr) IV Continuous <Continuous>  entecavir 0.5 milliGRAM(s) Oral daily  ferrous    sulfate 325 milliGRAM(s) Oral daily  metoprolol tartrate 50 milliGRAM(s) Oral two times a day  pantoprazole  Injectable 40 milliGRAM(s) IV Push every 12 hours  ranolazine 500 milliGRAM(s) Oral two times a day  tamsulosin 0.4 milliGRAM(s) Oral at bedtime    MEDICATIONS  (PRN):  acetaminophen   Tablet .. 650 milliGRAM(s) Oral every 6 hours PRN Mild Pain (1 - 3)      FAMILY HISTORY:    No family history of premature coronary artery disease or sudden cardiac death    SOCIAL HISTORY:  Smoking-Denies  Alcohol-Denies  Illicit Drug use-Denies    REVIEW OF SYSTEMS:  Constitutional: [ ] fever, [ ]weight loss,  [ ]fatigue  Eyes: [ ] visual changes  Respiratory: [ ]shortness of breath;  [ ] cough, [ ]wheezing, [ ]chills, [ ]hemoptysis  Cardiovascular: [ ] chest pain, [ ]palpitations, [ ]dizziness,  [ ]leg swelling [ ]syncope  Gastrointestinal: [ ] abdominal pain, [ ]nausea, [ ]vomiting,  [ ]diarrhea   Genitourinary: [ ] dysuria, [ ] hematuria  Neurologic: [ ] headaches [ ] tremors  [ ] weakness [ ] lightheadedness  Skin: [ ] itching, [ ]burning, [ ] rashes  Endocrine: [ ] heat or cold intolerance  Musculoskeletal: [ ] joint pain or swelling; [ ] muscle, back, or extremity pain  Psychiatric: [ ] depression, [ ]anxiety, [ ]mood swings, or [ ]difficulty sleeping  Hematologic: [ ] easy bruising, [ ] bleeding gums       [ x] All others negative	  [ ] Unable to obtain    Vital Signs Last 24 Hrs  T(C): 36.4 (06 Nov 2020 11:10), Max: 36.8 (06 Nov 2020 07:27)  T(F): 97.6 (06 Nov 2020 11:10), Max: 98.3 (06 Nov 2020 07:27)  HR: 71 (06 Nov 2020 11:10) (65 - 75)  BP: 127/65 (06 Nov 2020 11:10) (127/65 - 155/65)  BP(mean): --  RR: 17 (06 Nov 2020 11:10) (17 - 20)  SpO2: 99% (06 Nov 2020 11:10) (97% - 99%)  I&O's Summary      PHYSICAL EXAM:  General: No acute distress  HEENT: EOMI, PERRL  Neck: Supple, No JVD  Lungs: Clear to auscultation bilaterally; No rales or wheezing  Heart: Regular rate and rhythm; No murmurs, rubs, or gallops  Abdomen: Nontender, bowel sounds present  Extremities: No clubbing, cyanosis, or edema  Nervous system:  Alert & Oriented X3, no focal deficits  Psychiatric: Normal affect  Skin: No rashes or lesions      LABS:  11-06    139  |  103  |  38<H>  ----------------------------<  91  3.0<L>   |  27  |  1.93<H>    Ca    8.7      06 Nov 2020 06:35    TPro  7.2  /  Alb  2.8<L>  /  TBili  0.6  /  DBili  0.1  /  AST  15  /  ALT  13  /  AlkPhos  143<H>  11-06    Creatinine Trend: 1.93<--                        10.6   11.37 )-----------( 219      ( 06 Nov 2020 10:02 )             33.3     PT/INR - ( 06 Nov 2020 06:35 )   PT: 12.2 sec;   INR: 1.03 ratio         PTT - ( 06 Nov 2020 06:35 )  PTT:28.0 sec    Lipid Panel:   Cardiac Enzymes: CARDIAC MARKERS ( 06 Nov 2020 10:02 )  0.053 ng/mL / x     / 57 U/L / x     / 2.1 ng/mL    RADIOLOGY: none    ECG [my interpretation]:   < from: 12 Lead ECG (11.06.20 @ 06:11) >  Normal sinus rhythm  Left axis deviation  Right bundle branch block  Possible Lateral infarct , age undetermined  Abnormal ECG    Confirmed by HAILY PALMER ([id number]) on 11/6/2020 1:52:15 PM    < end of copied text >    TELEMETRY: n/a    ECHO: Pending

## 2020-11-06 NOTE — CHART NOTE - NSCHARTNOTEFT_GEN_A_CORE
Multiple attempts made to contact patient's family as per the 3 phone numbers in his chart with his alternate MRN. Was unable to reach anyone.

## 2020-11-06 NOTE — CONSULT NOTE ADULT - NEGATIVE MUSCULOSKELETAL SYMPTOMS
no arm pain L/no arm pain R/no muscle cramps/no stiffness/no muscle weakness/no back pain/no leg pain L/no leg pain R/no neck pain

## 2020-11-07 LAB
24R-OH-CALCIDIOL SERPL-MCNC: 37 NG/ML — SIGNIFICANT CHANGE UP (ref 30–80)
A1C WITH ESTIMATED AVERAGE GLUCOSE RESULT: 6.2 % — HIGH (ref 4–5.6)
ALBUMIN SERPL ELPH-MCNC: 2.3 G/DL — LOW (ref 3.5–5)
ALP SERPL-CCNC: 122 U/L — HIGH (ref 40–120)
ALT FLD-CCNC: 10 U/L DA — SIGNIFICANT CHANGE UP (ref 10–60)
ANION GAP SERPL CALC-SCNC: 5 MMOL/L — SIGNIFICANT CHANGE UP (ref 5–17)
ANION GAP SERPL CALC-SCNC: 6 MMOL/L — SIGNIFICANT CHANGE UP (ref 5–17)
ANION GAP SERPL CALC-SCNC: 6 MMOL/L — SIGNIFICANT CHANGE UP (ref 5–17)
AST SERPL-CCNC: 14 U/L — SIGNIFICANT CHANGE UP (ref 10–40)
BASOPHILS # BLD AUTO: 0.03 K/UL — SIGNIFICANT CHANGE UP (ref 0–0.2)
BASOPHILS # BLD AUTO: 0.03 K/UL — SIGNIFICANT CHANGE UP (ref 0–0.2)
BASOPHILS NFR BLD AUTO: 0.3 % — SIGNIFICANT CHANGE UP (ref 0–2)
BASOPHILS NFR BLD AUTO: 0.4 % — SIGNIFICANT CHANGE UP (ref 0–2)
BILIRUB DIRECT SERPL-MCNC: 0.2 MG/DL — SIGNIFICANT CHANGE UP (ref 0–0.2)
BILIRUB INDIRECT FLD-MCNC: 0.3 MG/DL — SIGNIFICANT CHANGE UP (ref 0.2–1)
BILIRUB SERPL-MCNC: 0.5 MG/DL — SIGNIFICANT CHANGE UP (ref 0.2–1.2)
BUN SERPL-MCNC: 21 MG/DL — HIGH (ref 7–18)
BUN SERPL-MCNC: 22 MG/DL — HIGH (ref 7–18)
BUN SERPL-MCNC: 24 MG/DL — HIGH (ref 7–18)
C DIFF BY PCR RESULT: SIGNIFICANT CHANGE UP
C DIFF TOX GENS STL QL NAA+PROBE: SIGNIFICANT CHANGE UP
CALCIUM SERPL-MCNC: 7.9 MG/DL — LOW (ref 8.4–10.5)
CALCIUM SERPL-MCNC: 7.9 MG/DL — LOW (ref 8.4–10.5)
CALCIUM SERPL-MCNC: 8.2 MG/DL — LOW (ref 8.4–10.5)
CHLORIDE SERPL-SCNC: 106 MMOL/L — SIGNIFICANT CHANGE UP (ref 96–108)
CHLORIDE SERPL-SCNC: 109 MMOL/L — HIGH (ref 96–108)
CHLORIDE SERPL-SCNC: 109 MMOL/L — HIGH (ref 96–108)
CHOLEST SERPL-MCNC: 184 MG/DL — SIGNIFICANT CHANGE UP
CK SERPL-CCNC: 40 U/L — SIGNIFICANT CHANGE UP (ref 35–232)
CO2 SERPL-SCNC: 25 MMOL/L — SIGNIFICANT CHANGE UP (ref 22–31)
CO2 SERPL-SCNC: 27 MMOL/L — SIGNIFICANT CHANGE UP (ref 22–31)
CO2 SERPL-SCNC: 27 MMOL/L — SIGNIFICANT CHANGE UP (ref 22–31)
CREAT SERPL-MCNC: 1.44 MG/DL — HIGH (ref 0.5–1.3)
CREAT SERPL-MCNC: 1.49 MG/DL — HIGH (ref 0.5–1.3)
CREAT SERPL-MCNC: 1.51 MG/DL — HIGH (ref 0.5–1.3)
CULTURE RESULTS: SIGNIFICANT CHANGE UP
CULTURE RESULTS: SIGNIFICANT CHANGE UP
EOSINOPHIL # BLD AUTO: 0.44 K/UL — SIGNIFICANT CHANGE UP (ref 0–0.5)
EOSINOPHIL # BLD AUTO: 0.46 K/UL — SIGNIFICANT CHANGE UP (ref 0–0.5)
EOSINOPHIL NFR BLD AUTO: 5.3 % — SIGNIFICANT CHANGE UP (ref 0–6)
EOSINOPHIL NFR BLD AUTO: 5.5 % — SIGNIFICANT CHANGE UP (ref 0–6)
ESTIMATED AVERAGE GLUCOSE: 131 MG/DL — HIGH (ref 68–114)
FERRITIN SERPL-MCNC: 225 NG/ML — SIGNIFICANT CHANGE UP (ref 30–400)
FOLATE SERPL-MCNC: 19.6 NG/ML — SIGNIFICANT CHANGE UP
GLUCOSE BLDC GLUCOMTR-MCNC: 100 MG/DL — HIGH (ref 70–99)
GLUCOSE BLDC GLUCOMTR-MCNC: 113 MG/DL — HIGH (ref 70–99)
GLUCOSE BLDC GLUCOMTR-MCNC: 122 MG/DL — HIGH (ref 70–99)
GLUCOSE BLDC GLUCOMTR-MCNC: 139 MG/DL — HIGH (ref 70–99)
GLUCOSE BLDC GLUCOMTR-MCNC: 94 MG/DL — SIGNIFICANT CHANGE UP (ref 70–99)
GLUCOSE SERPL-MCNC: 115 MG/DL — HIGH (ref 70–99)
GLUCOSE SERPL-MCNC: 117 MG/DL — HIGH (ref 70–99)
GLUCOSE SERPL-MCNC: 122 MG/DL — HIGH (ref 70–99)
HCT VFR BLD CALC: 32.7 % — LOW (ref 39–50)
HCT VFR BLD CALC: 33.2 % — LOW (ref 39–50)
HDLC SERPL-MCNC: 42 MG/DL — SIGNIFICANT CHANGE UP
HGB BLD-MCNC: 10.1 G/DL — LOW (ref 13–17)
HGB BLD-MCNC: 10.3 G/DL — LOW (ref 13–17)
IMM GRANULOCYTES NFR BLD AUTO: 0.3 % — SIGNIFICANT CHANGE UP (ref 0–1.5)
IMM GRANULOCYTES NFR BLD AUTO: 0.4 % — SIGNIFICANT CHANGE UP (ref 0–1.5)
LIPID PNL WITH DIRECT LDL SERPL: 116 MG/DL — HIGH
LYMPHOCYTES # BLD AUTO: 0.77 K/UL — LOW (ref 1–3.3)
LYMPHOCYTES # BLD AUTO: 1.2 K/UL — SIGNIFICANT CHANGE UP (ref 1–3.3)
LYMPHOCYTES # BLD AUTO: 13.8 % — SIGNIFICANT CHANGE UP (ref 13–44)
LYMPHOCYTES # BLD AUTO: 9.6 % — LOW (ref 13–44)
MAGNESIUM SERPL-MCNC: 1.9 MG/DL — SIGNIFICANT CHANGE UP (ref 1.6–2.6)
MCHC RBC-ENTMCNC: 30.9 GM/DL — LOW (ref 32–36)
MCHC RBC-ENTMCNC: 30.9 PG — SIGNIFICANT CHANGE UP (ref 27–34)
MCHC RBC-ENTMCNC: 31 GM/DL — LOW (ref 32–36)
MCHC RBC-ENTMCNC: 31 PG — SIGNIFICANT CHANGE UP (ref 27–34)
MCV RBC AUTO: 100.3 FL — HIGH (ref 80–100)
MCV RBC AUTO: 99.7 FL — SIGNIFICANT CHANGE UP (ref 80–100)
MONOCYTES # BLD AUTO: 1.11 K/UL — HIGH (ref 0–0.9)
MONOCYTES # BLD AUTO: 1.35 K/UL — HIGH (ref 0–0.9)
MONOCYTES NFR BLD AUTO: 13.9 % — SIGNIFICANT CHANGE UP (ref 2–14)
MONOCYTES NFR BLD AUTO: 15.5 % — HIGH (ref 2–14)
NEUTROPHILS # BLD AUTO: 5.62 K/UL — SIGNIFICANT CHANGE UP (ref 1.8–7.4)
NEUTROPHILS # BLD AUTO: 5.63 K/UL — SIGNIFICANT CHANGE UP (ref 1.8–7.4)
NEUTROPHILS NFR BLD AUTO: 64.8 % — SIGNIFICANT CHANGE UP (ref 43–77)
NEUTROPHILS NFR BLD AUTO: 70.2 % — SIGNIFICANT CHANGE UP (ref 43–77)
NON HDL CHOLESTEROL: 142 MG/DL — HIGH
NRBC # BLD: 0 /100 WBCS — SIGNIFICANT CHANGE UP (ref 0–0)
NRBC # BLD: 0 /100 WBCS — SIGNIFICANT CHANGE UP (ref 0–0)
NT-PROBNP SERPL-SCNC: 5908 PG/ML — HIGH (ref 0–450)
PHOSPHATE SERPL-MCNC: 2.3 MG/DL — LOW (ref 2.5–4.5)
PLATELET # BLD AUTO: 202 K/UL — SIGNIFICANT CHANGE UP (ref 150–400)
PLATELET # BLD AUTO: 208 K/UL — SIGNIFICANT CHANGE UP (ref 150–400)
POTASSIUM SERPL-MCNC: 2.9 MMOL/L — CRITICAL LOW (ref 3.5–5.3)
POTASSIUM SERPL-MCNC: 3 MMOL/L — LOW (ref 3.5–5.3)
POTASSIUM SERPL-MCNC: 4 MMOL/L — SIGNIFICANT CHANGE UP (ref 3.5–5.3)
POTASSIUM SERPL-SCNC: 2.9 MMOL/L — CRITICAL LOW (ref 3.5–5.3)
POTASSIUM SERPL-SCNC: 3 MMOL/L — LOW (ref 3.5–5.3)
POTASSIUM SERPL-SCNC: 4 MMOL/L — SIGNIFICANT CHANGE UP (ref 3.5–5.3)
PROT SERPL-MCNC: 6.1 G/DL — SIGNIFICANT CHANGE UP (ref 6–8.3)
RBC # BLD: 3.26 M/UL — LOW (ref 4.2–5.8)
RBC # BLD: 3.33 M/UL — LOW (ref 4.2–5.8)
RBC # FLD: 13.9 % — SIGNIFICANT CHANGE UP (ref 10.3–14.5)
RBC # FLD: 14.1 % — SIGNIFICANT CHANGE UP (ref 10.3–14.5)
SARS-COV-2 IGG SERPL QL IA: NEGATIVE — SIGNIFICANT CHANGE UP
SARS-COV-2 IGM SERPL IA-ACNC: <0.1 INDEX — SIGNIFICANT CHANGE UP
SODIUM SERPL-SCNC: 139 MMOL/L — SIGNIFICANT CHANGE UP (ref 135–145)
SODIUM SERPL-SCNC: 140 MMOL/L — SIGNIFICANT CHANGE UP (ref 135–145)
SODIUM SERPL-SCNC: 141 MMOL/L — SIGNIFICANT CHANGE UP (ref 135–145)
SPECIMEN SOURCE: SIGNIFICANT CHANGE UP
SPECIMEN SOURCE: SIGNIFICANT CHANGE UP
TRIGL SERPL-MCNC: 128 MG/DL — SIGNIFICANT CHANGE UP
TROPONIN I SERPL-MCNC: 0.05 NG/ML — HIGH (ref 0–0.04)
TROPONIN I SERPL-MCNC: 0.06 NG/ML — HIGH (ref 0–0.04)
TSH SERPL-MCNC: 1.02 UU/ML — SIGNIFICANT CHANGE UP (ref 0.34–4.82)
VIT B12 SERPL-MCNC: 900 PG/ML — SIGNIFICANT CHANGE UP (ref 232–1245)
WBC # BLD: 8 K/UL — SIGNIFICANT CHANGE UP (ref 3.8–10.5)
WBC # BLD: 8.7 K/UL — SIGNIFICANT CHANGE UP (ref 3.8–10.5)
WBC # FLD AUTO: 8 K/UL — SIGNIFICANT CHANGE UP (ref 3.8–10.5)
WBC # FLD AUTO: 8.7 K/UL — SIGNIFICANT CHANGE UP (ref 3.8–10.5)

## 2020-11-07 PROCEDURE — 99231 SBSQ HOSP IP/OBS SF/LOW 25: CPT

## 2020-11-07 PROCEDURE — 93306 TTE W/DOPPLER COMPLETE: CPT | Mod: 26

## 2020-11-07 PROCEDURE — 99232 SBSQ HOSP IP/OBS MODERATE 35: CPT

## 2020-11-07 RX ORDER — SODIUM,POTASSIUM PHOSPHATES 278-250MG
1 POWDER IN PACKET (EA) ORAL ONCE
Refills: 0 | Status: COMPLETED | OUTPATIENT
Start: 2020-11-07 | End: 2020-11-07

## 2020-11-07 RX ORDER — INFLUENZA VIRUS VACCINE 15; 15; 15; 15 UG/.5ML; UG/.5ML; UG/.5ML; UG/.5ML
0.5 SUSPENSION INTRAMUSCULAR ONCE
Refills: 0 | Status: COMPLETED | OUTPATIENT
Start: 2020-11-07 | End: 2020-11-11

## 2020-11-07 RX ORDER — POTASSIUM CHLORIDE 20 MEQ
20 PACKET (EA) ORAL ONCE
Refills: 0 | Status: COMPLETED | OUTPATIENT
Start: 2020-11-07 | End: 2020-11-07

## 2020-11-07 RX ORDER — POTASSIUM CHLORIDE 20 MEQ
10 PACKET (EA) ORAL
Refills: 0 | Status: DISCONTINUED | OUTPATIENT
Start: 2020-11-07 | End: 2020-11-07

## 2020-11-07 RX ORDER — POTASSIUM PHOSPHATE, MONOBASIC POTASSIUM PHOSPHATE, DIBASIC 236; 224 MG/ML; MG/ML
15 INJECTION, SOLUTION INTRAVENOUS ONCE
Refills: 0 | Status: DISCONTINUED | OUTPATIENT
Start: 2020-11-07 | End: 2020-11-07

## 2020-11-07 RX ORDER — SODIUM CHLORIDE 9 MG/ML
1000 INJECTION, SOLUTION INTRAVENOUS
Refills: 0 | Status: DISCONTINUED | OUTPATIENT
Start: 2020-11-07 | End: 2020-11-08

## 2020-11-07 RX ADMIN — Medication 325 MILLIGRAM(S): at 14:12

## 2020-11-07 RX ADMIN — Medication 50 MILLIGRAM(S): at 18:34

## 2020-11-07 RX ADMIN — PANTOPRAZOLE SODIUM 40 MILLIGRAM(S): 20 TABLET, DELAYED RELEASE ORAL at 18:34

## 2020-11-07 RX ADMIN — Medication 20 MILLIEQUIVALENT(S): at 14:12

## 2020-11-07 RX ADMIN — Medication 500 MILLIGRAM(S): at 22:15

## 2020-11-07 RX ADMIN — Medication 200 MILLIGRAM(S): at 18:35

## 2020-11-07 RX ADMIN — Medication 1 PACKET(S): at 14:12

## 2020-11-07 RX ADMIN — Medication 200 MILLIGRAM(S): at 06:32

## 2020-11-07 RX ADMIN — ENTECAVIR 0.5 MILLIGRAM(S): 0.5 TABLET ORAL at 14:12

## 2020-11-07 RX ADMIN — Medication 500 MILLIGRAM(S): at 06:31

## 2020-11-07 RX ADMIN — SODIUM CHLORIDE 70 MILLILITER(S): 9 INJECTION, SOLUTION INTRAVENOUS at 01:20

## 2020-11-07 RX ADMIN — Medication 50 MILLIGRAM(S): at 06:32

## 2020-11-07 RX ADMIN — ATORVASTATIN CALCIUM 40 MILLIGRAM(S): 80 TABLET, FILM COATED ORAL at 22:16

## 2020-11-07 RX ADMIN — Medication 100 MILLIEQUIVALENT(S): at 09:41

## 2020-11-07 RX ADMIN — TAMSULOSIN HYDROCHLORIDE 0.4 MILLIGRAM(S): 0.4 CAPSULE ORAL at 22:16

## 2020-11-07 RX ADMIN — Medication 100 MILLIEQUIVALENT(S): at 08:28

## 2020-11-07 RX ADMIN — PANTOPRAZOLE SODIUM 40 MILLIGRAM(S): 20 TABLET, DELAYED RELEASE ORAL at 06:31

## 2020-11-07 RX ADMIN — Medication 500 MILLIGRAM(S): at 14:12

## 2020-11-07 NOTE — PATIENT PROFILE ADULT - NSTRANSFERBELONGINGSRESP_GEN_A_NUR
Patient : Adam Mcconnell Age: 5 year old Sex: male   MRN: [id number] Encounter Date: 8/20/2017      History     Chief Complaint   Patient presents with   • Laceration     Patient is a 5-year-old male brought in by mom and grandmother after sustaining a laceration to the left middle finger. Patient was utilizing a knife to cut up an apple after getting hungry and waking up this morning. He denies any numbness or tingling and has full range of motion of the finger. Patient has no other major medical issues or concerns/injuries.       The history is provided by the patient, the mother and a relative.   Laceration    The incident occurred less than 1 hour ago. The laceration is located on the left hand. The laceration is 1 cm in size. The laceration mechanism was a a clean knife. The pain is mild. The pain has been constant since onset. He reports no foreign bodies present. His tetanus status is UTD.       ALLERGIES:   Allergen Reactions   • Cephalexin RASH     fine rash/itch/torso-centric within 4 doses of cephalexin       Prior to Admission Medications    ACETAMINOPHEN (TYLENOL) 160 MG/5ML SUSPENSION    Take 15 mg/kg by mouth every 4 hours as needed.    ALBUTEROL 108 (90 BASE) MCG/ACT INHALER    Inhale 2 puffs into the lungs every 4 hours as needed for Shortness of Breath or Wheezing.    BECLOMETHASONE DIPROP (QVAR) 80 MCG/ACT INHALER    Inhale 1 puff into the lungs 2 times daily.    CETIRIZINE (ZYRTEC CHILDRENS ALLERGY) 5 MG/5ML SYRUP    Take 5 mLs by mouth at bedtime.    IBUPROFEN (MOTRIN) 100 MG/5ML SUSPENSION    Take  by mouth every 8 hours as needed.    SPACER/AERO-HOLDING CHAMBERS (AEROCHAMBER WITH MASK, MEDIUM)    Use with albuterol inhaler as directed       New Prescriptions    No medications on file       Past Medical History:   Diagnosis Date   • Pneumonia    • Wheezing     1/16, 4/16       History reviewed. No pertinent surgical history.    Family History   Problem Relation Age of Onset   • NEGATIVE FAMILY HX OF  Mother    • Asthma Father        Social History   Substance Use Topics   • Smoking status: Never Smoker   • Smokeless tobacco: Never Used      Comment: ~no one in the household smokes   • Alcohol use No       Review of Systems   Musculoskeletal: Negative for arthralgias, joint swelling and myalgias.   Skin: Positive for wound. Negative for color change and pallor.   Neurological: Negative for weakness and numbness.       Physical Exam     ED Triage Vitals [08/20/17 0307]   ED Triage Vitals Group      Temp 98.4 °F (36.9 °C)      Heart Rate 107      Resp 16      BP       SpO2 99 %      EtCO2 mmHg       Height       Weight 53 lb 5.6 oz (24.2 kg)      Weight Scale Used ED Actual       Physical Exam   Constitutional: He appears well-developed and well-nourished. No distress.   Musculoskeletal:        Left hand: He exhibits laceration. Normal sensation noted. Normal strength noted.        Hands:  Brisk capillary refill distally, normal range of motion of the left middle finger, no evidence for tendon disruption on testing.    Neurological: He is alert.   Skin: Skin is warm and dry. Capillary refill takes less than 3 seconds. He is not diaphoretic. No pallor.   Nursing note and vitals reviewed.        ED Course     Laceration Repair  Date/Time: 8/20/2017 4:45 AM  Performed by: ARIANA TARANGO  Authorized by: ARIANA TARANGO     Consent:     Consent obtained:  Verbal    Consent given by:  Patient and parent    Risks discussed:  Infection, pain, poor cosmetic result and poor wound healing    Alternatives discussed:  No treatment  Anesthesia (see MAR for exact dosages):     Anesthesia method:  Local infiltration    Local anesthetic:  Lidocaine 1% w/o epi  Laceration details:     Location:  Finger    Finger location:  L long finger    Length (cm):  1    Depth (mm):  2  Repair type:     Repair type:  Simple  Pre-procedure details:     Preparation:  Patient was prepped and draped in usual sterile fashion  Exploration:      Hemostasis achieved with:  Direct pressure    Wound exploration: wound explored through full range of motion      Contaminated: no    Treatment:     Area cleansed with:  Pedro    Amount of cleaning:  Standard    Irrigation solution:  Sterile saline    Irrigation volume:  250    Irrigation method:  Syringe    Visualized foreign bodies/material removed: no    Skin repair:     Repair method:  Sutures    Suture size:  5-0    Suture material:  Nylon    Number of sutures:  2  Approximation:     Approximation:  Close    Vermilion border: well-aligned    Post-procedure details:     Dressing:  Antibiotic ointment and non-adherent dressing    Patient tolerance of procedure:  Tolerated well, no immediate complications        MDM  Number of Diagnoses or Management Options     Amount and/or Complexity of Data Reviewed  Obtain history from someone other than the patient: yes (Mom and grandmother)  Review and summarize past medical records: yes    Risk of Complications, Morbidity, and/or Mortality  Presenting problems: moderate  Diagnostic procedures: minimal  Management options: moderate    Patient Progress  Patient progress: stable        Labs:   No results found for this visit on 08/20/17.       Radiology:   No orders to display      Images reviewed by Emergency Physician.      Medications/Fluids ordered/given:  Medications   lidocaine 1 % injection 10 mg (not administered)   lidocaine 4 %-EPINEPHRine 0.05 % (L.E.) (compounded) topical gel 2 mL (2 mLs Topical Given 8/20/17 0345)         Diagnosis:  1. Finger laceration, initial encounter      ED Course/MDM:    Diagnosis  The encounter diagnosis was Finger laceration, initial encounter.    Follow Up:  Alec Banda MD  [id number] [place] [address] [phone]    Schedule an appointment as soon as possible for a visit in 10 days  For suture removal       New Prescriptions    No medications on file       Pt is discharged to home/self care in stable condition.         Price Lowery MD  08/20/17 1044     yes

## 2020-11-07 NOTE — PROGRESS NOTE ADULT - SUBJECTIVE AND OBJECTIVE BOX
Patient seen and examined at bedside with no complaints.   Denies pain, nausea and vomiting    Vital Signs Last 24 Hrs  T(F): 97.5 (11-07-20 @ 05:05), Max: 98.5 (11-06-20 @ 21:46)  HR: 57 (11-07-20 @ 05:05)  BP: 114/53 (11-07-20 @ 05:05)  RR: 17 (11-07-20 @ 05:05)  SpO2: 96% (11-07-20 @ 05:05)  POCT Blood Glucose.: 100 mg/dL (07 Nov 2020 12:07)    GENERAL: Alert, NAD  CHEST/LUNG: respirations nonlabored  ABDOMEN: soft, Nontender, Nondistended  EXTREMITIES:  no calf tenderness, No edema    I&O's Detail    06 Nov 2020 07:01  -  07 Nov 2020 07:00  --------------------------------------------------------  IN:  Total IN: 0 mL    OUT:    Rectal Tube (mL): 10 mL    Voided (mL): 500 mL  Total OUT: 510 mL    Total NET: -510 mL    LABS:                        10.1   8.00  )-----------( 208      ( 07 Nov 2020 06:18 )             32.7     11-07    141  |  109<H>  |  22<H>  ----------------------------<  122<H>  2.9<LL>   |  27  |  1.49<H>    Ca    7.9<L>      07 Nov 2020 06:18  Phos  2.3     11-07  Mg     1.9     11-07    TPro  6.1  /  Alb  2.3<L>  /  TBili  0.5  /  DBili  0.2  /  AST  14  /  ALT  10  /  AlkPhos  122<H>  11-07    PT/INR - ( 06 Nov 2020 06:35 )   PT: 12.2 sec;   INR: 1.03 ratio      PTT - ( 06 Nov 2020 06:35 )  PTT:28.0 sec    RADIOLOGY & ADDITIONAL STUDIES:

## 2020-11-07 NOTE — PROGRESS NOTE ADULT - RS GEN PE MLT RESP DETAILS PC
good air movement/no rales/breath sounds equal/no rhonchi/airway patent/no wheezes/respirations non-labored

## 2020-11-07 NOTE — PROGRESS NOTE ADULT - PROBLEM SELECTOR PLAN 4
Noted to be on amlodipine and lisinopril 2019 that patient is no longer taking. Continue with metoprolol, hold furosemide  Continue to monitor BP  BP normotensive

## 2020-11-07 NOTE — PROGRESS NOTE ADULT - NEGATIVE PSYCHIATRIC SYMPTOMS
no memory loss/no mood swings/no agitation/no insomnia/no paranoia/no suicidal ideation/no anxiety/no depression

## 2020-11-07 NOTE — PROGRESS NOTE ADULT - PROBLEM SELECTOR PLAN 6
Appears to have baseline creatinine around 1.7, presented with 1.93. cretinine improving   avoid nephrotoxic agents

## 2020-11-07 NOTE — PROGRESS NOTE ADULT - PROBLEM SELECTOR PLAN 1
presented persistent dark colored diarrhea, hemodynamically stable. diarrhea resolved   - GI, Dr. Edwards planning to do EGD coming Monday, will start bowel prep tomorrow.  - on IV protonix BID, NPO  - Hb stable brittany monitor  CT abd also revealed acute sameer, no RUQ tenderness. surgery onboard, HIDA ordered will follow results  c/w cipro, Flagyl

## 2020-11-07 NOTE — PROGRESS NOTE ADULT - PROBLEM SELECTOR PLAN 8
Echo 2018 noted with normal EF, G1DD  Will hold furosemide for now given no s/s of fluid overload    Repeat echo  - Cardio, Dr. Ha

## 2020-11-07 NOTE — PROGRESS NOTE ADULT - NEGATIVE MUSCULOSKELETAL SYMPTOMS
no arm pain R/no muscle cramps/no muscle weakness/no back pain/no leg pain L/no stiffness/no arm pain L/no neck pain/no leg pain R

## 2020-11-07 NOTE — PROGRESS NOTE ADULT - SUBJECTIVE AND OBJECTIVE BOX
[   ] ICU                                          [   ] CCU                                      [ X  ] Medical Floor      Patient is comfortable. No new complaints reported, No abdominal pain, N/V, hematemesis, hematochezia, melena, fever, chills, chest pain, SOB, cough or diarrhea reported.    VITALS  Vital Signs Last 24 Hrs  T(C): 36.4 (07 Nov 2020 05:05), Max: 36.9 (06 Nov 2020 21:46)  T(F): 97.5 (07 Nov 2020 05:05), Max: 98.5 (06 Nov 2020 21:46)  HR: 57 (07 Nov 2020 05:05) (53 - 76)  BP: 114/53 (07 Nov 2020 05:05) (114/53 - 132/61)  BP(mean): 78 (06 Nov 2020 15:30) (78 - 78)  RR: 17 (07 Nov 2020 05:05) (17 - 18)  SpO2: 96% (07 Nov 2020 05:05) (96% - 98%)       MEDICATIONS  (STANDING):  atorvastatin 40 milliGRAM(s) Oral at bedtime  ciprofloxacin   IVPB 400 milliGRAM(s) IV Intermittent every 12 hours  dextrose 5% + sodium chloride 0.45%. 1000 milliLiter(s) (70 mL/Hr) IV Continuous <Continuous>  entecavir 0.5 milliGRAM(s) Oral daily  ferrous    sulfate 325 milliGRAM(s) Oral daily  influenza   Vaccine 0.5 milliLiter(s) IntraMuscular once  metoprolol tartrate 50 milliGRAM(s) Oral two times a day  metroNIDAZOLE    Tablet 500 milliGRAM(s) Oral every 8 hours  pantoprazole  Injectable 40 milliGRAM(s) IV Push every 12 hours  potassium chloride    Tablet ER 20 milliEquivalent(s) Oral once  potassium phosphate / sodium phosphate Powder (PHOS-NaK) 1 Packet(s) Oral once  tamsulosin 0.4 milliGRAM(s) Oral at bedtime    MEDICATIONS  (PRN):  acetaminophen   Tablet .. 650 milliGRAM(s) Oral every 6 hours PRN Mild Pain (1 - 3)                            10.1   8.00  )-----------( 208      ( 07 Nov 2020 06:18 )             32.7       11-07    141  |  109<H>  |  22<H>  ----------------------------<  122<H>  2.9<LL>   |  27  |  1.49<H>    Ca    7.9<L>      07 Nov 2020 06:18  Phos  2.3     11-07  Mg     1.9     11-07    TPro  6.1  /  Alb  2.3<L>  /  TBili  0.5  /  DBili  0.2  /  AST  14  /  ALT  10  /  AlkPhos  122<H>  11-07      PT/INR - ( 06 Nov 2020 06:35 )   PT: 12.2 sec;   INR: 1.03 ratio         PTT - ( 06 Nov 2020 06:35 )  PTT:28.0 sec

## 2020-11-07 NOTE — PROGRESS NOTE ADULT - SUBJECTIVE AND OBJECTIVE BOX
Patient was seen examined on bedside. Reports improvement in abdominal pain, no RUQ. No nausea, vomiting, no diarrhea, no dark colored stool. Denies chest pain. Patient still npo for possible cholecystitis. Discussed with Dr. Edwards, plan to do EGD coming Monday. Surgery on board. HIDA scan ordered pending, will follow results.     MEDICATIONS  (STANDING):  atorvastatin 40 milliGRAM(s) Oral at bedtime  ciprofloxacin   IVPB 400 milliGRAM(s) IV Intermittent every 12 hours  dextrose 5% + sodium chloride 0.45%. 1000 milliLiter(s) (70 mL/Hr) IV Continuous <Continuous>  entecavir 0.5 milliGRAM(s) Oral daily  ferrous    sulfate 325 milliGRAM(s) Oral daily  influenza   Vaccine 0.5 milliLiter(s) IntraMuscular once  metoprolol tartrate 50 milliGRAM(s) Oral two times a day  metroNIDAZOLE    Tablet 500 milliGRAM(s) Oral every 8 hours  pantoprazole  Injectable 40 milliGRAM(s) IV Push every 12 hours  tamsulosin 0.4 milliGRAM(s) Oral at bedtime    MEDICATIONS  (PRN):  acetaminophen   Tablet .. 650 milliGRAM(s) Oral every 6 hours PRN Mild Pain (1 - 3)    ROS is negative for 14 systems except as mentioned above in subjctive    PHYSICAL EXAM:  Constitutional: alert awake x 3, nad  Eyes: EOMI, no icterus  ENMT: no redness   Neck: supple  Respiratory: bilateral equal air entry, no wheezing, ronchi  Cardiovascular: S1 S2 heard, no  MRG  Gastrointestinal: soft non distended non tender. BS +, no RUQ tenderness  Genitourinary: no CVA tenderness  Extremities: no led edema  Neurological: CN grossly intact, no focal deficit  Skin: no rash        Labs:                          10.3   8.70  )-----------( 202      ( 07 Nov 2020 15:23 )             33.2       11-07    140  |  109<H>  |  21<H>  ----------------------------<  117<H>  4.0   |  25  |  1.44<H>    Ca    8.2<L>      07 Nov 2020 15:23  Phos  2.3     11-07  Mg     1.9     11-07    TPro  6.1  /  Alb  2.3<L>  /  TBili  0.5  /  DBili  0.2  /  AST  14  /  ALT  10  /  AlkPhos  122<H>  11-07      Imaging reviewed

## 2020-11-07 NOTE — PROGRESS NOTE ADULT - ASSESSMENT
Patient is a 85 year old male with PMHx of CAD on ticagrelor, CHFpEF, HTN, DM presenting with episodes of diarrhea reported to be dark in nature, admitted for possible GI bleed, also noted with troponin elevation and found to have mild cholecystitis on CT abdomen.

## 2020-11-07 NOTE — PROGRESS NOTE ADULT - GASTROINTESTINAL DETAILS
soft/no rebound tenderness/no rigidity/no distention/no masses palpable/no guarding/no bruit/nontender/bowel sounds normal

## 2020-11-07 NOTE — PROGRESS NOTE ADULT - NEGATIVE NEUROLOGICAL SYMPTOMS
no syncope/no vertigo/no difficulty walking/no loss of sensation/no headache/no loss of consciousness/no tremors

## 2020-11-07 NOTE — PROGRESS NOTE ADULT - NEGATIVE OPHTHALMOLOGIC SYMPTOMS
no photophobia/no scleral injection R/no lacrimation L/no pain R/no irritation L/no scleral injection L/no lacrimation R/no blurred vision L/no diplopia/no discharge L/no pain L/no irritation R/no blurred vision R/no discharge R

## 2020-11-07 NOTE — PROGRESS NOTE ADULT - PROBLEM SELECTOR PLAN 3
Extensive cardiac history, last cath 2019?   troponin elevation, likely demand ischemia.  antiplatemet asa, Brillinta on hold  Cardiology, Dr. Ha consulted  - echocardiogram pending

## 2020-11-07 NOTE — PROGRESS NOTE ADULT - PROBLEM SELECTOR PLAN 2
Pw 3 day hx of diarrhea 5-6 episodes, is dark and watery. resolved today  -  stool studies including C. Diff sent, GI PCR, stool culture, stool O+P negative  - iv hydration

## 2020-11-07 NOTE — PROGRESS NOTE ADULT - NEGATIVE GENERAL GENITOURINARY SYMPTOMS
no flank pain L/no hematuria/no flank pain R/no renal colic/no incontinence/no bladder infections/no dysuria/no urine discoloration/no gas in urine

## 2020-11-07 NOTE — PROGRESS NOTE ADULT - ASSESSMENT
85y old Male with asymptomatic cholecystitis seen on CT scan    - may get HIDA or US for further evaluation  - HIDA pending  - serial exams  - monitor labs  - discussed with Dr. Hogan

## 2020-11-07 NOTE — PROGRESS NOTE ADULT - NEGATIVE ENMT SYMPTOMS
no nasal congestion/no post-nasal discharge/no nose bleeds/no dry mouth/no dysphagia/no gum bleeding/no throat pain/no vertigo/no sinus symptoms/no nasal discharge/no ear pain/no tinnitus/no hearing difficulty

## 2020-11-07 NOTE — PROGRESS NOTE ADULT - ASSESSMENT
1. Anemia  2. Melena  3. Stool positive for occult blood  4. R/o Peptic ulcer disease  5. R/o AVM's  6. R/o Right side colonic bleeding  7. Gall stone R/o cholecystitis    Suggestions:  1. Monitor H/H  2. Transfuse PRBC as needed  3. Protonix IV  4. NPO  5. Check stool for culture, O&P and C. Diff toxin  6. Monitor Electrolytes  7. Antibiotics IV  8. EGD  9. HIDA Scan  10. Surgical follow up  11. IVF hydration  12. Avoid NSAID  13. DVT prophylaxis

## 2020-11-08 LAB
ANION GAP SERPL CALC-SCNC: 7 MMOL/L — SIGNIFICANT CHANGE UP (ref 5–17)
ANISOCYTOSIS BLD QL: SLIGHT — SIGNIFICANT CHANGE UP
BASOPHILS # BLD AUTO: 0 K/UL — SIGNIFICANT CHANGE UP (ref 0–0.2)
BASOPHILS NFR BLD AUTO: 0 % — SIGNIFICANT CHANGE UP (ref 0–2)
BUN SERPL-MCNC: 17 MG/DL — SIGNIFICANT CHANGE UP (ref 7–18)
CALCIUM SERPL-MCNC: 8 MG/DL — LOW (ref 8.4–10.5)
CHLORIDE SERPL-SCNC: 108 MMOL/L — SIGNIFICANT CHANGE UP (ref 96–108)
CO2 SERPL-SCNC: 24 MMOL/L — SIGNIFICANT CHANGE UP (ref 22–31)
CREAT SERPL-MCNC: 1.44 MG/DL — HIGH (ref 0.5–1.3)
EOSINOPHIL # BLD AUTO: 0.3 K/UL — SIGNIFICANT CHANGE UP (ref 0–0.5)
EOSINOPHIL NFR BLD AUTO: 5 % — SIGNIFICANT CHANGE UP (ref 0–6)
GLUCOSE BLDC GLUCOMTR-MCNC: 110 MG/DL — HIGH (ref 70–99)
GLUCOSE BLDC GLUCOMTR-MCNC: 120 MG/DL — HIGH (ref 70–99)
GLUCOSE BLDC GLUCOMTR-MCNC: 126 MG/DL — HIGH (ref 70–99)
GLUCOSE BLDC GLUCOMTR-MCNC: 135 MG/DL — HIGH (ref 70–99)
GLUCOSE BLDC GLUCOMTR-MCNC: 91 MG/DL — SIGNIFICANT CHANGE UP (ref 70–99)
GLUCOSE SERPL-MCNC: 146 MG/DL — HIGH (ref 70–99)
HCT VFR BLD CALC: 31.3 % — LOW (ref 39–50)
HGB BLD-MCNC: 9.7 G/DL — LOW (ref 13–17)
LYMPHOCYTES # BLD AUTO: 0.97 K/UL — LOW (ref 1–3.3)
LYMPHOCYTES # BLD AUTO: 16 % — SIGNIFICANT CHANGE UP (ref 13–44)
MACROCYTES BLD QL: SLIGHT — SIGNIFICANT CHANGE UP
MAGNESIUM SERPL-MCNC: 2 MG/DL — SIGNIFICANT CHANGE UP (ref 1.6–2.6)
MCHC RBC-ENTMCNC: 31 GM/DL — LOW (ref 32–36)
MCHC RBC-ENTMCNC: 31.1 PG — SIGNIFICANT CHANGE UP (ref 27–34)
MCV RBC AUTO: 100.3 FL — HIGH (ref 80–100)
MONOCYTES # BLD AUTO: 0.85 K/UL — SIGNIFICANT CHANGE UP (ref 0–0.9)
MONOCYTES NFR BLD AUTO: 14 % — SIGNIFICANT CHANGE UP (ref 2–14)
NEUTROPHILS # BLD AUTO: 3.87 K/UL — SIGNIFICANT CHANGE UP (ref 1.8–7.4)
NEUTROPHILS NFR BLD AUTO: 64 % — SIGNIFICANT CHANGE UP (ref 43–77)
NRBC # BLD: 0 /100 — SIGNIFICANT CHANGE UP (ref 0–0)
OVALOCYTES BLD QL SMEAR: SLIGHT — SIGNIFICANT CHANGE UP
PHOSPHATE SERPL-MCNC: 1.8 MG/DL — LOW (ref 2.5–4.5)
PLAT MORPH BLD: NORMAL — SIGNIFICANT CHANGE UP
PLATELET # BLD AUTO: 205 K/UL — SIGNIFICANT CHANGE UP (ref 150–400)
POIKILOCYTOSIS BLD QL AUTO: SLIGHT — SIGNIFICANT CHANGE UP
POTASSIUM SERPL-MCNC: 3.4 MMOL/L — LOW (ref 3.5–5.3)
POTASSIUM SERPL-SCNC: 3.4 MMOL/L — LOW (ref 3.5–5.3)
RBC # BLD: 3.12 M/UL — LOW (ref 4.2–5.8)
RBC # FLD: 14.1 % — SIGNIFICANT CHANGE UP (ref 10.3–14.5)
RBC BLD AUTO: ABNORMAL
SODIUM SERPL-SCNC: 139 MMOL/L — SIGNIFICANT CHANGE UP (ref 135–145)
VARIANT LYMPHS # BLD: 1 % — SIGNIFICANT CHANGE UP (ref 0–6)
WBC # BLD: 6.05 K/UL — SIGNIFICANT CHANGE UP (ref 3.8–10.5)
WBC # FLD AUTO: 6.05 K/UL — SIGNIFICANT CHANGE UP (ref 3.8–10.5)

## 2020-11-08 PROCEDURE — 99232 SBSQ HOSP IP/OBS MODERATE 35: CPT

## 2020-11-08 PROCEDURE — 99231 SBSQ HOSP IP/OBS SF/LOW 25: CPT

## 2020-11-08 RX ORDER — LANOLIN ALCOHOL/MO/W.PET/CERES
3 CREAM (GRAM) TOPICAL AT BEDTIME
Refills: 0 | Status: DISCONTINUED | OUTPATIENT
Start: 2020-11-08 | End: 2020-11-11

## 2020-11-08 RX ORDER — POTASSIUM CHLORIDE 20 MEQ
10 PACKET (EA) ORAL ONCE
Refills: 0 | Status: DISCONTINUED | OUTPATIENT
Start: 2020-11-08 | End: 2020-11-11

## 2020-11-08 RX ORDER — SODIUM,POTASSIUM PHOSPHATES 278-250MG
1 POWDER IN PACKET (EA) ORAL ONCE
Refills: 0 | Status: COMPLETED | OUTPATIENT
Start: 2020-11-08 | End: 2020-11-08

## 2020-11-08 RX ADMIN — Medication 500 MILLIGRAM(S): at 13:09

## 2020-11-08 RX ADMIN — Medication 500 MILLIGRAM(S): at 05:31

## 2020-11-08 RX ADMIN — PANTOPRAZOLE SODIUM 40 MILLIGRAM(S): 20 TABLET, DELAYED RELEASE ORAL at 05:32

## 2020-11-08 RX ADMIN — Medication 200 MILLIGRAM(S): at 05:33

## 2020-11-08 RX ADMIN — Medication 50 MILLIGRAM(S): at 05:31

## 2020-11-08 RX ADMIN — Medication 500 MILLIGRAM(S): at 22:08

## 2020-11-08 RX ADMIN — Medication 200 MILLIGRAM(S): at 18:33

## 2020-11-08 RX ADMIN — ATORVASTATIN CALCIUM 40 MILLIGRAM(S): 80 TABLET, FILM COATED ORAL at 22:08

## 2020-11-08 RX ADMIN — PANTOPRAZOLE SODIUM 40 MILLIGRAM(S): 20 TABLET, DELAYED RELEASE ORAL at 17:39

## 2020-11-08 RX ADMIN — Medication 50 MILLIGRAM(S): at 17:40

## 2020-11-08 RX ADMIN — TAMSULOSIN HYDROCHLORIDE 0.4 MILLIGRAM(S): 0.4 CAPSULE ORAL at 22:08

## 2020-11-08 RX ADMIN — ENTECAVIR 0.5 MILLIGRAM(S): 0.5 TABLET ORAL at 12:38

## 2020-11-08 RX ADMIN — Medication 325 MILLIGRAM(S): at 12:36

## 2020-11-08 RX ADMIN — Medication 1 PACKET(S): at 12:38

## 2020-11-08 RX ADMIN — Medication 3 MILLIGRAM(S): at 22:16

## 2020-11-08 NOTE — PROGRESS NOTE ADULT - NEGATIVE MUSCULOSKELETAL SYMPTOMS
no muscle cramps/no muscle weakness/no neck pain/no back pain/no leg pain L/no leg pain R/no stiffness/no arm pain L/no arm pain R

## 2020-11-08 NOTE — PROGRESS NOTE ADULT - ASSESSMENT
Patient is a 85 year old male with PMHx of CAD on ticagrelor, CHFpEF, HTN, DM presenting with episodes of diarrhea reported to be dark in nature, admitted for possible GI bleed, also noted with troponin elevation and found to have mild cholecystitis on CT abdomen. Patient has shown clinical improvement, no N/V/D. started on clear liquid diet.

## 2020-11-08 NOTE — PROGRESS NOTE ADULT - SUBJECTIVE AND OBJECTIVE BOX
Patient was seen examined on bedside. Reports improvement in abdominal pain, no RUQ. No nausea, vomiting, no diarrhea, no dark colored stool. GI Dr. Edwards on board, plan to do EGD coming Monday. Surgery on board. HIDA scan ordered, npo past midnight. started on clear liquid diet today.    MEDICATIONS  (STANDING):  atorvastatin 40 milliGRAM(s) Oral at bedtime  ciprofloxacin   IVPB 400 milliGRAM(s) IV Intermittent every 12 hours  entecavir 0.5 milliGRAM(s) Oral daily  ferrous    sulfate 325 milliGRAM(s) Oral daily  influenza   Vaccine 0.5 milliLiter(s) IntraMuscular once  metoprolol tartrate 50 milliGRAM(s) Oral two times a day  metroNIDAZOLE    Tablet 500 milliGRAM(s) Oral every 8 hours  pantoprazole  Injectable 40 milliGRAM(s) IV Push every 12 hours  potassium chloride  10 mEq/100 mL IVPB 10 milliEquivalent(s) IV Intermittent once  tamsulosin 0.4 milliGRAM(s) Oral at bedtime    MEDICATIONS  (PRN):  acetaminophen   Tablet .. 650 milliGRAM(s) Oral every 6 hours PRN Mild Pain (1 - 3)      ROS is negative for 14 systems except as mentioned above in subjctive    PHYSICAL EXAM:  Constitutional: alert awake x 3, nad  Eyes: EOMI, no icterus  ENMT: no redness   Neck: supple  Respiratory: bilateral equal air entry, no wheezing, ronchi  Cardiovascular: S1 S2 heard, no  MRG  Gastrointestinal: soft non distended non tender. BS +, no RUQ tenderness  Genitourinary: no CVA tenderness  Extremities: no led edema  Neurological: CN grossly intact, no focal deficit  Skin: no rash        Labs:                          9.7    6.05  )-----------( 205      ( 08 Nov 2020 07:03 )             31.3     11-08    139  |  108  |  17  ----------------------------<  146<H>  3.4<L>   |  24  |  1.44<H>    Ca    8.0<L>      08 Nov 2020 07:03  Phos  1.8     11-08  Mg     2.0     11-08    TPro  6.1  /  Alb  2.3<L>  /  TBili  0.5  /  DBili  0.2  /  AST  14  /  ALT  10  /  AlkPhos  122<H>  11-07        Imaging reviewed

## 2020-11-08 NOTE — PROGRESS NOTE ADULT - ASSESSMENT
85M with incidental acute cholecystitis on CT, however clinically stable with benign abdomen    -Clear liquid diet  -F/u HIDA scan, npo night before HIDA scan  -pain control as needed  -remainder of care per primary team

## 2020-11-08 NOTE — PROGRESS NOTE ADULT - SUBJECTIVE AND OBJECTIVE BOX
INTERVAL HPI/OVERNIGHT EVENTS:  Pt resting comfortably. No acute complaints/events overnight  Denies N/V    MEDICATIONS  (STANDING):  atorvastatin 40 milliGRAM(s) Oral at bedtime  ciprofloxacin   IVPB 400 milliGRAM(s) IV Intermittent every 12 hours  dextrose 5% + sodium chloride 0.45%. 1000 milliLiter(s) (70 mL/Hr) IV Continuous <Continuous>  entecavir 0.5 milliGRAM(s) Oral daily  ferrous    sulfate 325 milliGRAM(s) Oral daily  influenza   Vaccine 0.5 milliLiter(s) IntraMuscular once  metoprolol tartrate 50 milliGRAM(s) Oral two times a day  metroNIDAZOLE    Tablet 500 milliGRAM(s) Oral every 8 hours  pantoprazole  Injectable 40 milliGRAM(s) IV Push every 12 hours  potassium chloride  10 mEq/100 mL IVPB 10 milliEquivalent(s) IV Intermittent once  tamsulosin 0.4 milliGRAM(s) Oral at bedtime    MEDICATIONS  (PRN):  acetaminophen   Tablet .. 650 milliGRAM(s) Oral every 6 hours PRN Mild Pain (1 - 3)    Vital Signs Last 24 Hrs  T(C): 36.6 (08 Nov 2020 05:25), Max: 37.1 (07 Nov 2020 18:23)  T(F): 97.9 (08 Nov 2020 05:25), Max: 98.7 (07 Nov 2020 18:23)  HR: 59 (08 Nov 2020 05:25) (59 - 60)  BP: 123/43 (08 Nov 2020 05:25) (122/51 - 140/59)  BP(mean): --  RR: 18 (08 Nov 2020 05:25) (16 - 18)  SpO2: 98% (08 Nov 2020 05:25) (96% - 98%)    Physical:  General: A&Ox3. NAD.  Chest: respirations unlabored  Abdomen: Soft nondistended, nontender to palpation. No guarding    I&O's Detail    07 Nov 2020 07:01  -  08 Nov 2020 07:00  --------------------------------------------------------  IN:  Total IN: 0 mL    OUT:    Voided (mL): 800 mL  Total OUT: 800 mL    Total NET: -800 mL    LABS:                        9.7    6.05  )-----------( 205      ( 08 Nov 2020 07:03 )             31.3             11-08    139  |  108  |  17  ----------------------------<  146<H>  3.4<L>   |  24  |  1.44<H>    Ca    8.0<L>      08 Nov 2020 07:03  Phos  1.8     11-08  Mg     2.0     11-08    TPro  6.1  /  Alb  2.3<L>  /  TBili  0.5  /  DBili  0.2  /  AST  14  /  ALT  10  /  AlkPhos  122<H>  11-07

## 2020-11-08 NOTE — PROGRESS NOTE ADULT - PROBLEM SELECTOR PLAN 3
Extensive cardiac history, last cath 2019?   troponin elevation, likely demand ischemia.  antiplatelet asa, Brillinta on hold  Cardiology, Dr. Ha consulted  - echocardiogram pending

## 2020-11-08 NOTE — PROGRESS NOTE ADULT - PROBLEM SELECTOR PLAN 2
Pw 3 day hx of diarrhea 5-6 episodes, is dark and watery. resolved today  -  stool studies, GI PCR, stool culture, stool O+P negative  - iv hydration  resolved

## 2020-11-08 NOTE — PROGRESS NOTE ADULT - NEGATIVE GENERAL GENITOURINARY SYMPTOMS
no renal colic/no flank pain L/no gas in urine/no bladder infections/no dysuria/no hematuria/no flank pain R/no urine discoloration/no incontinence

## 2020-11-08 NOTE — PROGRESS NOTE ADULT - NEGATIVE ENMT SYMPTOMS
no throat pain/no dysphagia/no nasal discharge/no nose bleeds/no dry mouth/no gum bleeding/no post-nasal discharge/no vertigo/no nasal congestion/no hearing difficulty/no sinus symptoms/no ear pain/no tinnitus

## 2020-11-08 NOTE — PROGRESS NOTE ADULT - NEGATIVE OPHTHALMOLOGIC SYMPTOMS
no lacrimation R/no irritation L/no scleral injection R/no blurred vision L/no blurred vision R/no discharge R/no pain L/no lacrimation L/no diplopia/no photophobia/no discharge L/no pain R/no irritation R/no scleral injection L

## 2020-11-08 NOTE — PROGRESS NOTE ADULT - GASTROINTESTINAL DETAILS
no guarding/bowel sounds normal/soft/nontender/no masses palpable/no bruit/no rebound tenderness/no rigidity/no distention

## 2020-11-08 NOTE — PROGRESS NOTE ADULT - NEGATIVE NEUROLOGICAL SYMPTOMS
no vertigo/no difficulty walking/no loss of sensation/no loss of consciousness/no syncope/no tremors/no headache

## 2020-11-08 NOTE — PROGRESS NOTE ADULT - SUBJECTIVE AND OBJECTIVE BOX
[   ] ICU                                          [   ] CCU                                      [  X ] Medical Floor      Patient is comfortable. No new complaints reported, No abdominal pain, N/V, hematemesis, hematochezia, melena, fever, chills, chest pain, SOB, cough or diarrhea reported.    VITALS  Vital Signs Last 24 Hrs  T(C): 36.6 (08 Nov 2020 05:25), Max: 37.1 (07 Nov 2020 18:23)  T(F): 97.9 (08 Nov 2020 05:25), Max: 98.7 (07 Nov 2020 18:23)  HR: 59 (08 Nov 2020 05:25) (59 - 60)  BP: 123/43 (08 Nov 2020 05:25) (122/51 - 140/59)   RR: 18 (08 Nov 2020 05:25) (16 - 18)  SpO2: 98% (08 Nov 2020 05:25) (96% - 98%)       MEDICATIONS  (STANDING):  atorvastatin 40 milliGRAM(s) Oral at bedtime  ciprofloxacin   IVPB 400 milliGRAM(s) IV Intermittent every 12 hours  dextrose 5% + sodium chloride 0.45%. 1000 milliLiter(s) (70 mL/Hr) IV Continuous <Continuous>  entecavir 0.5 milliGRAM(s) Oral daily  ferrous    sulfate 325 milliGRAM(s) Oral daily  influenza   Vaccine 0.5 milliLiter(s) IntraMuscular once  metoprolol tartrate 50 milliGRAM(s) Oral two times a day  metroNIDAZOLE    Tablet 500 milliGRAM(s) Oral every 8 hours  pantoprazole  Injectable 40 milliGRAM(s) IV Push every 12 hours  potassium chloride  10 mEq/100 mL IVPB 10 milliEquivalent(s) IV Intermittent once  tamsulosin 0.4 milliGRAM(s) Oral at bedtime    MEDICATIONS  (PRN):  acetaminophen   Tablet .. 650 milliGRAM(s) Oral every 6 hours PRN Mild Pain (1 - 3)                            9.7    6.05  )-----------( 205      ( 08 Nov 2020 07:03 )             31.3       11-08    139  |  108  |  17  ----------------------------<  146<H>  3.4<L>   |  24  |  1.44<H>    Ca    8.0<L>      08 Nov 2020 07:03  Phos  1.8     11-08  Mg     2.0     11-08    TPro  6.1  /  Alb  2.3<L>  /  TBili  0.5  /  DBili  0.2  /  AST  14  /  ALT  10  /  AlkPhos  122<H>  11-07

## 2020-11-08 NOTE — PROGRESS NOTE ADULT - PROBLEM SELECTOR PLAN 1
presented persistent dark colored diarrhea, hemodynamically stable. diarrhea resolved   - GI, Dr. Edwards planning to do EGD coming Monday,  bowel prep tomorrow.  - on IV protonix BID,  - Hb stable   CT abd also revealed acute sameer, no RUQ tenderness. surgery onboard, HIDA ordered will follow results  c/w cipro, Flagyl  started clear liquid diet  npo past midnight for HIDA  and EGD tomorrow

## 2020-11-08 NOTE — PROGRESS NOTE ADULT - NEGATIVE PSYCHIATRIC SYMPTOMS
no depression/no anxiety/no memory loss/no agitation/no paranoia/no mood swings/no suicidal ideation/no insomnia

## 2020-11-09 ENCOUNTER — APPOINTMENT (OUTPATIENT)
Dept: CARDIOLOGY | Facility: CLINIC | Age: 85
End: 2020-11-09
Payer: MEDICARE

## 2020-11-09 ENCOUNTER — TRANSCRIPTION ENCOUNTER (OUTPATIENT)
Age: 85
End: 2020-11-09

## 2020-11-09 ENCOUNTER — RESULT REVIEW (OUTPATIENT)
Age: 85
End: 2020-11-09

## 2020-11-09 LAB
ANION GAP SERPL CALC-SCNC: 8 MMOL/L — SIGNIFICANT CHANGE UP (ref 5–17)
BASOPHILS # BLD AUTO: 0.02 K/UL — SIGNIFICANT CHANGE UP (ref 0–0.2)
BASOPHILS NFR BLD AUTO: 0.3 % — SIGNIFICANT CHANGE UP (ref 0–2)
BUN SERPL-MCNC: 14 MG/DL — SIGNIFICANT CHANGE UP (ref 7–18)
CALCIUM SERPL-MCNC: 8.1 MG/DL — LOW (ref 8.4–10.5)
CHLORIDE SERPL-SCNC: 109 MMOL/L — HIGH (ref 96–108)
CO2 SERPL-SCNC: 24 MMOL/L — SIGNIFICANT CHANGE UP (ref 22–31)
CREAT SERPL-MCNC: 1.38 MG/DL — HIGH (ref 0.5–1.3)
CULTURE RESULTS: SIGNIFICANT CHANGE UP
CULTURE RESULTS: SIGNIFICANT CHANGE UP
EOSINOPHIL # BLD AUTO: 0.56 K/UL — HIGH (ref 0–0.5)
EOSINOPHIL NFR BLD AUTO: 8.8 % — HIGH (ref 0–6)
GLUCOSE SERPL-MCNC: 131 MG/DL — HIGH (ref 70–99)
HCT VFR BLD CALC: 33.2 % — LOW (ref 39–50)
HGB BLD-MCNC: 10.4 G/DL — LOW (ref 13–17)
IMM GRANULOCYTES NFR BLD AUTO: 0.3 % — SIGNIFICANT CHANGE UP (ref 0–1.5)
LYMPHOCYTES # BLD AUTO: 1.19 K/UL — SIGNIFICANT CHANGE UP (ref 1–3.3)
LYMPHOCYTES # BLD AUTO: 18.7 % — SIGNIFICANT CHANGE UP (ref 13–44)
MAGNESIUM SERPL-MCNC: 1.9 MG/DL — SIGNIFICANT CHANGE UP (ref 1.6–2.6)
MCHC RBC-ENTMCNC: 31 PG — SIGNIFICANT CHANGE UP (ref 27–34)
MCHC RBC-ENTMCNC: 31.3 GM/DL — LOW (ref 32–36)
MCV RBC AUTO: 99.1 FL — SIGNIFICANT CHANGE UP (ref 80–100)
MONOCYTES # BLD AUTO: 1.14 K/UL — HIGH (ref 0–0.9)
MONOCYTES NFR BLD AUTO: 17.9 % — HIGH (ref 2–14)
NEUTROPHILS # BLD AUTO: 3.44 K/UL — SIGNIFICANT CHANGE UP (ref 1.8–7.4)
NEUTROPHILS NFR BLD AUTO: 54 % — SIGNIFICANT CHANGE UP (ref 43–77)
NRBC # BLD: 0 /100 WBCS — SIGNIFICANT CHANGE UP (ref 0–0)
PHOSPHATE SERPL-MCNC: 2.1 MG/DL — LOW (ref 2.5–4.5)
PLATELET # BLD AUTO: 223 K/UL — SIGNIFICANT CHANGE UP (ref 150–400)
POTASSIUM SERPL-MCNC: 3.8 MMOL/L — SIGNIFICANT CHANGE UP (ref 3.5–5.3)
POTASSIUM SERPL-SCNC: 3.8 MMOL/L — SIGNIFICANT CHANGE UP (ref 3.5–5.3)
RBC # BLD: 3.35 M/UL — LOW (ref 4.2–5.8)
RBC # FLD: 13.9 % — SIGNIFICANT CHANGE UP (ref 10.3–14.5)
SODIUM SERPL-SCNC: 141 MMOL/L — SIGNIFICANT CHANGE UP (ref 135–145)
SPECIMEN SOURCE: SIGNIFICANT CHANGE UP
SPECIMEN SOURCE: SIGNIFICANT CHANGE UP
WBC # BLD: 6.37 K/UL — SIGNIFICANT CHANGE UP (ref 3.8–10.5)
WBC # FLD AUTO: 6.37 K/UL — SIGNIFICANT CHANGE UP (ref 3.8–10.5)

## 2020-11-09 PROCEDURE — 78226 HEPATOBILIARY SYSTEM IMAGING: CPT | Mod: 26

## 2020-11-09 PROCEDURE — 99231 SBSQ HOSP IP/OBS SF/LOW 25: CPT

## 2020-11-09 PROCEDURE — 99232 SBSQ HOSP IP/OBS MODERATE 35: CPT | Mod: GC

## 2020-11-09 PROCEDURE — 88305 TISSUE EXAM BY PATHOLOGIST: CPT | Mod: 26

## 2020-11-09 PROCEDURE — 88312 SPECIAL STAINS GROUP 1: CPT | Mod: 26

## 2020-11-09 RX ORDER — MORPHINE SULFATE 50 MG/1
2 CAPSULE, EXTENDED RELEASE ORAL ONCE
Refills: 0 | Status: DISCONTINUED | OUTPATIENT
Start: 2020-11-09 | End: 2020-11-09

## 2020-11-09 RX ADMIN — PANTOPRAZOLE SODIUM 40 MILLIGRAM(S): 20 TABLET, DELAYED RELEASE ORAL at 06:10

## 2020-11-09 RX ADMIN — Medication 50 MILLIGRAM(S): at 06:09

## 2020-11-09 RX ADMIN — MORPHINE SULFATE 2 MILLIGRAM(S): 50 CAPSULE, EXTENDED RELEASE ORAL at 11:09

## 2020-11-09 RX ADMIN — Medication 200 MILLIGRAM(S): at 12:57

## 2020-11-09 RX ADMIN — PANTOPRAZOLE SODIUM 40 MILLIGRAM(S): 20 TABLET, DELAYED RELEASE ORAL at 17:47

## 2020-11-09 RX ADMIN — ATORVASTATIN CALCIUM 40 MILLIGRAM(S): 80 TABLET, FILM COATED ORAL at 23:57

## 2020-11-09 RX ADMIN — ENTECAVIR 0.5 MILLIGRAM(S): 0.5 TABLET ORAL at 17:47

## 2020-11-09 RX ADMIN — Medication 325 MILLIGRAM(S): at 17:47

## 2020-11-09 RX ADMIN — Medication 500 MILLIGRAM(S): at 06:09

## 2020-11-09 RX ADMIN — Medication 50 MILLIGRAM(S): at 17:47

## 2020-11-09 NOTE — DISCHARGE NOTE PROVIDER - NSDCMRMEDTOKEN_GEN_ALL_CORE_FT
Luis Danielaglar KwikPen 100 units/mL subcutaneous solution: 14-20 units  Brilinta (ticagrelor) 90 mg oral tablet: 1 tab(s) orally 2 times a day  Crestor 10 mg oral tablet: 1 tab(s) orally once a day  entecavir 0.5 mg oral tablet: 1 tab(s) orally once a day  ferrous sulfate 325 mg (65 mg elemental iron) oral tablet: 1 tab(s) orally once a day  furosemide 40 mg oral tablet: 1 tab(s) orally once a day  metoprolol succinate 100 mg oral tablet, extended release: 1 tab(s) orally once a day  tamsulosin 0.4 mg oral capsule: 1 cap(s) orally once a day   Brilinta (ticagrelor) 90 mg oral tablet: 1 tab(s) orally 2 times a day  Crestor 10 mg oral tablet: 1 tab(s) orally once a day  entecavir 0.5 mg oral tablet: 1 tab(s) orally once a day  ferrous sulfate 325 mg (65 mg elemental iron) oral tablet: 1 tab(s) orally once a day  furosemide 40 mg oral tablet: 1 tab(s) orally once a day  metoprolol succinate 100 mg oral tablet, extended release: 1 tab(s) orally once a day  Protonix 40 mg oral delayed release tablet: 1 tab(s) orally once a day   tamsulosin 0.4 mg oral capsule: 1 cap(s) orally once a day   Brilinta (ticagrelor) 90 mg oral tablet: 1 tab(s) orally 2 times a day  Crestor 10 mg oral tablet: 1 tab(s) orally once a day  entecavir 0.5 mg oral tablet: 1 tab(s) orally once a day  ferrous sulfate 325 mg (65 mg elemental iron) oral tablet: 1 tab(s) orally once a day  furosemide 20 mg oral tablet: 1 tab(s) orally once a day  metoprolol succinate 100 mg oral tablet, extended release: 1 tab(s) orally once a day  Protonix 40 mg oral delayed release tablet: 1 tab(s) orally once a day   tamsulosin 0.4 mg oral capsule: 1 cap(s) orally once a day

## 2020-11-09 NOTE — PROGRESS NOTE ADULT - SUBJECTIVE AND OBJECTIVE BOX
INTERVAL HPI/OVERNIGHT EVENTS:  Pt resting comfortably. No acute complaints.   Tolerating diet.   flatus/BM.   Denies N/V    MEDICATIONS  (STANDING):  atorvastatin 40 milliGRAM(s) Oral at bedtime  ciprofloxacin   IVPB 400 milliGRAM(s) IV Intermittent every 12 hours  entecavir 0.5 milliGRAM(s) Oral daily  ferrous    sulfate 325 milliGRAM(s) Oral daily  influenza   Vaccine 0.5 milliLiter(s) IntraMuscular once  melatonin 3 milliGRAM(s) Oral at bedtime  metoprolol tartrate 50 milliGRAM(s) Oral two times a day  metroNIDAZOLE    Tablet 500 milliGRAM(s) Oral every 8 hours  pantoprazole  Injectable 40 milliGRAM(s) IV Push every 12 hours  potassium chloride  10 mEq/100 mL IVPB 10 milliEquivalent(s) IV Intermittent once  tamsulosin 0.4 milliGRAM(s) Oral at bedtime    MEDICATIONS  (PRN):  acetaminophen   Tablet .. 650 milliGRAM(s) Oral every 6 hours PRN Mild Pain (1 - 3)    Vital Signs Last 24 Hrs  T(C): 36.8 (09 Nov 2020 06:12), Max: 36.8 (09 Nov 2020 06:12)  T(F): 98.2 (09 Nov 2020 06:12), Max: 98.2 (09 Nov 2020 06:12)  HR: 62 (09 Nov 2020 06:12) (56 - 65)  BP: 123/45 (09 Nov 2020 06:12) (123/45 - 149/41)  BP(mean): --  RR: 16 (09 Nov 2020 06:12) (16 - 19)  SpO2: 100% (09 Nov 2020 06:12) (94% - 100%)    Physical:  General: A&Ox3. NAD.  Chest: respirations unlabored  Abdomen: Soft nondistended, nontender to palpation.    I&O's Detail    08 Nov 2020 07:01  -  09 Nov 2020 07:00  --------------------------------------------------------  IN:  Total IN: 0 mL    OUT:    Voided (mL): 800 mL  Total OUT: 800 mL    Total NET: -800 mL      LABS:                        10.4   6.37  )-----------( 223      ( 09 Nov 2020 06:22 )             33.2             11-09    141  |  109<H>  |  14  ----------------------------<  131<H>  3.8   |  24  |  1.38<H>    Ca    8.1<L>      09 Nov 2020 06:22  Phos  2.1     11-09  Mg     1.9     11-09

## 2020-11-09 NOTE — DISCHARGE NOTE PROVIDER - NSDCCAREPROVSEEN_GEN_ALL_CORE_FT
Angela, Fouzia Britton, Ezequiel Treadwell, [facility], Fouiza Britton, Ezequiel Treadwell, Major Lucio, Ean

## 2020-11-09 NOTE — DISCHARGE NOTE PROVIDER - CARE PROVIDER_API CALL
Rikki Edwards)  Medicine  [address] [place] [address], [address] [place] [address]  Phone: [phone]  Fax: [phone]  Follow Up Time:

## 2020-11-09 NOTE — PROGRESS NOTE ADULT - PROBLEM SELECTOR PLAN 1
With persistent dark diarrhea, hemodynamically stable. Patient does not know if he has had a EGD/colonoscopy before.  - Obtain orthostatic BP readings  - GI, Dr. Edwards consulted  - Started on IV protonix BID, NPO,  EGD as per GI  - Will trend CBC q8h until hgb noted to be stable.  CT abd also revealed acute sameer, surgery consulted, HIDA ordered  - blood transfusion consent in chart With persistent dark diarrhea, hemodynamically stable. Patient does not know if he has had a EGD/colonoscopy before.  - Obtain orthostatic BP readings  - GI, Dr. Edwards consulted  - Started on IV protonix BID, NPO,  EGD as per GI  - Will trend CBC q8h until hgb noted to be stable.  CT abd also revealed acute sameer, surgery consulted, HIDA performed, showing no evidence of acute cholecystitis  - blood transfusion consent in chart With persistent dark diarrhea, hemodynamically stable. Patient does not know if he has had a EGD/colonoscopy before.  - GI, Dr. Edwards consulted  - Started on IV protonix BID, NPO,  EGD as per GI  CT abd also revealed acute sameer, surgery consulted, HIDA performed, showing no evidence of acute cholecystitis

## 2020-11-09 NOTE — DISCHARGE NOTE PROVIDER - NSDCPNSUBOBJ_GEN_ALL_CORE
Patient is a 85y old  Male who presents with a chief complaint of Diarrhea (10 Nov 2020 13:36)      Patient was seen examined at bedside   Denies any complains  Tolerating diet     INTERVAL HPI/OVERNIGHT EVENTS:  T(C): 36.3 (11-11-20 @ 13:41), Max: 36.6 (11-10-20 @ 20:25)  HR: 89 (11-11-20 @ 13:41) (62 - 89)  BP: 154/72 (11-11-20 @ 13:41) (135/64 - 154/72)  RR: 18 (11-11-20 @ 13:41) (17 - 18)  SpO2: 99% (11-11-20 @ 13:41) (96% - 99%)  Wt(kg): --  I&O's Summary      REVIEW OF SYSTEMS: denies fever, chills, SOB, palpitations, chest pain, abdominal pain, nausea, vomiting, diarrhea, constipation, dizziness    MEDICATIONS  (STANDING):  atorvastatin 40 milliGRAM(s) Oral at bedtime  entecavir 0.5 milliGRAM(s) Oral daily  ferrous    sulfate 325 milliGRAM(s) Oral daily  furosemide    Tablet 20 milliGRAM(s) Oral daily  melatonin 3 milliGRAM(s) Oral at bedtime  metoprolol tartrate 50 milliGRAM(s) Oral two times a day  pantoprazole  Injectable 40 milliGRAM(s) IV Push every 12 hours  potassium chloride  10 mEq/100 mL IVPB 10 milliEquivalent(s) IV Intermittent once  tamsulosin 0.4 milliGRAM(s) Oral at bedtime    MEDICATIONS  (PRN):  acetaminophen   Tablet .. 650 milliGRAM(s) Oral every 6 hours PRN Mild Pain (1 - 3)      PHYSICAL EXAM:  GENERAL: NAD  NERVOUS SYSTEM:  Alert & Oriented X3, Good concentration; Motor Strength 5/5 B/L upper and lower extremities  CHEST/LUNG: Clear to auscultation bilaterally; No rales, rhonchi, wheezing, or rubs  HEART: Regular rate and rhythm; No murmurs, rubs, or gallops  ABDOMEN: Soft, Nontender, Nondistended; Bowel sounds present  EXTREMITIES:  2+ Peripheral Pulses, No clubbing, cyanosis, or edema  SKIN: No rashes or lesions    LABS:                        10.1   6.36  )-----------( 227      ( 11 Nov 2020 06:31 )             32.5     141  |  111<H>  |  22<H>  ----------------------------<  129<H>  4.6   |  23  |  1.49<H>    Ca    8.2<L>      11 Nov 2020 06:31  Phos  2.6     11-11  Mg     2.1     11-11    TPro  6.1  /  Alb  2.2<L>  /  TBili  0.3  /  DBili  x   /  AST  21  /  ALT  10  /  AlkPhos  123<H>  11-11    A/P:  GIB due to gastric and esophageal ulcer   CAD  CKD  HTN  DM  Chronic HBV infection   Acute cholecystitis ruled out    Plan:  Cont PPI  Hb stable   Biopsy: gastritis and H. pylorie neg  Dr Edawrds is okay to start Brilinta   Patient is stable to be discharged

## 2020-11-09 NOTE — PROGRESS NOTE ADULT - PROBLEM SELECTOR PLAN 8
Echo 2018 noted with normal EF, G1DD  Will hold furosemide for now given no s/s of fluid overload  Follow up CXR  Repeat echo  - Cardio, Dr. Ha Echo 2018 noted with normal EF, G1DD  Will hold furosemide for now given no s/s of fluid overload  echo done

## 2020-11-09 NOTE — PROGRESS NOTE ADULT - PROBLEM SELECTOR PLAN 3
Extensive cardiac history, last cath 2019?  With chest pain and troponin elevation, likely demand ischemia in the setting of abdominal pathology.  - Started on ranolazine which patient has been on in the past, however not currently taking.  - Hold Brilinta, continue beta blocker, re-start high intensity statin. Patient noted to not be on aspirin.  - Trend troponins until peak  Cardiology, Dr. Ha consulted  - Repeat echocardiogram Extensive cardiac history, last cath 2019?  With chest pain and troponin elevation, likely demand ischemia in the setting of abdominal pathology.  - Started on ranolazine which patient has been on in the past, however not currently taking.  - Hold Brilinta, continue beta blocker, re-start high intensity statin. Patient noted to not be on aspirin.  Cardiology, Dr. Ha consulted

## 2020-11-09 NOTE — PROGRESS NOTE ADULT - ASSESSMENT
Patient is a 85 year old male with PMHx of CAD on ticagrelor, CHFpEF, HTN, DM presenting with episodes of diarrhea reported to be dark in nature, admitted for possible GI bleed, also noted with troponin elevation.     *** Patient brought home medications that does not seem to include some medications he was on a year ago such as statin, ranolazine, lisinopril, amlodipine Patient is a 85 year old male with PMHx of CAD on ticagrelor, CHFpEF, HTN, DM presenting with episodes of diarrhea reported to be dark in nature, admitted for possible GI bleed, also noted with troponin elevation.

## 2020-11-09 NOTE — PROGRESS NOTE ADULT - PROBLEM SELECTOR PLAN 2
Pw 3 day hx of diarrhea 5-6 episodes, is dark and watery. Noted to be green early on admission and then became block. Rule out infectious cause as diarrhea started after ingestion of raw seafood.  - Follow up stool studies including C. Diff, GI PCR, stool culture, stool O+P  - Rectal tube for persistent diarrhea  - Started gentle hydration Pw 3 day hx of diarrhea 5-6 episodes, is dark and watery. Noted to be green early on admission and then became block. Rule out infectious cause as diarrhea started after ingestion of raw seafood.  - Follow up stool studies including C. Diff, GI PCR, stool culture, stool O+P.   - Stool culture negative, C Diff negative, GI PCR negative   - Rectal tube for persistent diarrhea  - Started gentle hydration Pw 3 day hx of diarrhea 5-6 episodes, is dark and watery. Noted to be green early on admission and then became block. Rule out infectious cause as diarrhea started after ingestion of raw seafood.  - Stool culture negative, C Diff negative, GI PCR negative

## 2020-11-09 NOTE — PROGRESS NOTE ADULT - PROBLEM SELECTOR PLAN 6
Appears to have baseline creatinine around 1.7, presented with 1.93  Repeat BMP after gentle hydration.  If continues to worsen, would obtain urinelytes, US renal, nephro consult.  Avoid nephrotoxic medications Appears to have baseline creatinine around 1.7, presented with 1.93  creatinine 1.38 (improving)  Avoid nephrotoxic medications

## 2020-11-09 NOTE — DISCHARGE NOTE PROVIDER - HOSPITAL COURSE
Patient is an 85 year old [place] male, lives at home with wife, ambulates with walker, with PMH of CAD, last cardiac cath @ [facility] 06/2019 w/ PTCA proxLAD ISR, residual proxRCA 30-50% ISR, HTN, DM, CHFpEF presenting with chief complaint of diarrhea. Patient states that on 11/3, he ate raw oyster and started developing 5-6 episodes of dark watery bowel movements a day associated with some abdominal pain and nausea. Patient also endorsed chest pain at time of admission, but noted that this was a chronic issue. No SOB, abd pain resolved, no dizziness, fever, cough, vomiting, dizziness. On admission, was started on Cipro and Flagyl. Stool studies were performed which were negative for C. Diff. He is being followed by GI and surgery. HIDA scan ordered after CT abdomen in the ED revealed acute cholecystitis. HIDA scan was negative for acute cholecystitis, patient had  EGD that showed>>. patient condition improved.   Patient is an 85 year old [place] male, lives at home with wife, ambulates with walker, with PMH of CAD, last cardiac cath @ [facility] 06/2019 w/ PTCA proxLAD ISR, residual proxRCA 30-50% ISR, HTN, DM, CHFpEF presenting with chief complaint of diarrhea. Patient states that on 11/3, he ate raw oyster and started developing 5-6 episodes of dark watery bowel movements a day associated with some abdominal pain and nausea. Patient also endorsed chest pain at time of admission, but noted that this was a chronic issue. No SOB, abd pain resolved, no dizziness, fever, cough, vomiting, dizziness. On admission, was started on Cipro and Flagyl. Stool studies were performed which were negative for C. Diff. He is being followed by GI and surgery. HIDA scan ordered after CT abdomen in the ED revealed acute cholecystitis. HIDA scan was negative for acute cholecystitis, patient had  EGD that showed gastric ulcer, oesophageal ulcer and gastritis. patient is recommended to continue on protonix and follow up with biopsy result as an outpatient. patient condition improved.

## 2020-11-09 NOTE — PROGRESS NOTE ADULT - ASSESSMENT
85M with incidental acute cholecystitis on CT. Abdominal pain resolved    -F/u HIDA scan, if unremarkable may advance to regular  -Pain control as needed  -Remainder of care per primary team

## 2020-11-09 NOTE — DISCHARGE NOTE PROVIDER - NSDCCPCAREPLAN_GEN_ALL_CORE_FT
PRINCIPAL DISCHARGE DIAGNOSIS  Diagnosis: Gastrointestinal hemorrhage, unspecified gastrointestinal hemorrhage type  Assessment and Plan of Treatment: You presented with persistent dark diarrhea, You were seen by gastroenterologist. You were started on IV protonix twice daily . You are recommended to continue on protonix. CT abd was done and  revealed acute sameer, surgery consulted, HIDA performed, showing no evidence of acute cholecystitis.          PRINCIPAL DISCHARGE DIAGNOSIS  Diagnosis: Gastrointestinal hemorrhage, unspecified gastrointestinal hemorrhage type  Assessment and Plan of Treatment: You presented with persistent dark diarrhea, You were seen by gastroenterologist. You were started on IV protonix twice daily . You are recommended to continue on protonix. CT abd was done and  revealed acute cholecystitis . HIDA scan was done and ruled out acute cholecystitis., You were seen by surgery and gastroentorology.         PRINCIPAL DISCHARGE DIAGNOSIS  Diagnosis: Gastrointestinal hemorrhage, unspecified gastrointestinal hemorrhage type  Assessment and Plan of Treatment: You presented with persistent dark diarrhea, You were seen by gastroenterologist. You were started on IV protonix twice daily . You are recommended to continue on protonix. CT abd was done and  revealed acute cholecystitis . HIDA scan was done and ruled out acute cholecystitis., You were seen by surgery and gastroentorology.Upper endoscopy was done and showed.>>>>.         SECONDARY DISCHARGE DIAGNOSES  Diagnosis: Diarrhea  Assessment and Plan of Treatment: You presented with diarrhea. C. difficile and other stool studies came back negative. You were started on     PRINCIPAL DISCHARGE DIAGNOSIS  Diagnosis: Gastrointestinal hemorrhage, unspecified gastrointestinal hemorrhage type  Assessment and Plan of Treatment: You presented with persistent dark diarrhea, You were seen by gastroenterologist. You were started on IV protonix twice daily . You are recommended to continue on protonix. CT abd was done and  revealed acute cholecystitis . HIDA scan was done and ruled out acute cholecystitis., You were seen by surgery and gastroentorology.Upper endoscopy was done and showed gastric ulcer, oesophageal ulcer and gastritis. patient is recommended to continue on protonix and follow up with gastroenterologist Dr Cummings with biopsy result as an outpatient within 2 weeks after discharge.        SECONDARY DISCHARGE DIAGNOSES  Diagnosis: Diarrhea  Assessment and Plan of Treatment: You presented with diarrhea. C. difficile and other stool studies came back negative. You were started on ciprofloxacin and flagyl. Your condition improved.    Diagnosis: CAD (coronary artery disease)  Assessment and Plan of Treatment: You had past history of CAD (coronary artery disease). You were on plavix not on aspirin, You are recommended to follow up with your cardiologist for the possibility of changing plavix to aspirin because of lower risk of bleeding.    Diagnosis: Prediabetes  Assessment and Plan of Treatment: Your HBA1C was 6.2 . You are in prediabetic range.  You should maintain healthy lifestyle by eating healthy low salt diet, avoid fatty food, weight loss, exercise regularly as tolerated 30 mins X 3 time per week and follow up with your PCP.    Diagnosis: BPH (benign prostatic hyperplasia)  Assessment and Plan of Treatment: You had past history of BPH (benign prostatic hyperplasia).  Continue with home medication and follow up with your PCP      Diagnosis: HTN (hypertension)  Assessment and Plan of Treatment: Blood Pressure Control , Please continue current medication regimen, and follow up with your PCP  - You have a history of Hypertension.   -You were  on metoprolol and furosimide.  - You should continue on the current antihypertensive regimen regularly.  - You blood pressure should be within 140-120/80-90.  - You should follow-up with your PCP within 1 week of your discharge for routine blood pressure monitoring at your next visit.  - Notify your doctor if you have any of the following symptoms:   (Dizziness, Lightheadedness, Blurry vision, Headache, Chest pain, Shortness of breath.)  - You should maintain healthy lifestyle by eating healthy low salt diet, avoid fatty food, weight loss, exercise regularly as tolerated 30 mins X 3 time per week.      Diagnosis: Hepatitis B  Assessment and Plan of Treatment: You had past history of Hepatitis B.  Continue with home medication entecavir and follow up with your PCP      Diagnosis: Chronic kidney disease (CKD)  Assessment and Plan of Treatment: You have past history of CKD , Your creatinine level was around the same base line. You are recommended to follow up with your PCP after discharge.     PRINCIPAL DISCHARGE DIAGNOSIS  Diagnosis: Gastrointestinal hemorrhage, unspecified gastrointestinal hemorrhage type  Assessment and Plan of Treatment: You presented with persistent dark diarrhea, You were seen by gastroenterologist. You were started on IV protonix twice daily . You are recommended to continue on protonix. CT abd was done and  revealed acute cholecystitis . HIDA scan was done and ruled out acute cholecystitis., You were seen by surgery and gastroentorology.Upper endoscopy was done and showed gastric ulcer, oesophageal ulcer and gastritis. patient is recommended to continue on protonix and follow up with gastroenterologist Dr Edwards with biopsy result as an outpatient within 2 weeks after discharge.        SECONDARY DISCHARGE DIAGNOSES  Diagnosis: Diarrhea  Assessment and Plan of Treatment: You presented with diarrhea. C. difficile and other stool studies came back negative. You were started on ciprofloxacin and flagyl. Your condition improved.    Diagnosis: CAD (coronary artery disease)  Assessment and Plan of Treatment: You had past history of CAD (coronary artery disease). You were on brilinta , You are recommended to continue on it ,, You are recommended to follow up with your cardiologist after discharge.    Diagnosis: Prediabetes  Assessment and Plan of Treatment: Your HBA1C was 6.2 . You are in prediabetic range.  You should maintain healthy lifestyle by eating healthy low salt diet, avoid fatty food, weight loss, exercise regularly as tolerated 30 mins X 3 time per week and follow up with your PCP.    Diagnosis: BPH (benign prostatic hyperplasia)  Assessment and Plan of Treatment: You had past history of BPH (benign prostatic hyperplasia).  Continue with home medication and follow up with your PCP      Diagnosis: HTN (hypertension)  Assessment and Plan of Treatment: Blood Pressure Control , Please continue current medication regimen, and follow up with your PCP  - You have a history of Hypertension.   -You were  on metoprolol and furosimide.  - You should continue on the current antihypertensive regimen regularly.  - You blood pressure should be within 140-120/80-90.  - You should follow-up with your PCP within 1 week of your discharge for routine blood pressure monitoring at your next visit.  - Notify your doctor if you have any of the following symptoms:   (Dizziness, Lightheadedness, Blurry vision, Headache, Chest pain, Shortness of breath.)  - You should maintain healthy lifestyle by eating healthy low salt diet, avoid fatty food, weight loss, exercise regularly as tolerated 30 mins X 3 time per week.      Diagnosis: Hepatitis B  Assessment and Plan of Treatment: You had past history of Hepatitis B.  Continue with home medication entecavir and follow up with your PCP      Diagnosis: Chronic kidney disease (CKD)  Assessment and Plan of Treatment: You have past history of CKD , Your creatinine level was around the same base line. You are recommended to follow up with your PCP after discharge.     PRINCIPAL DISCHARGE DIAGNOSIS  Diagnosis: Gastrointestinal hemorrhage, unspecified gastrointestinal hemorrhage type  Assessment and Plan of Treatment: You presented with persistent dark diarrhea, You were seen by gastroenterologist. You were started on IV protonix twice daily . You are recommended to continue on protonix. CT abd was done and  revealed acute cholecystitis . HIDA scan was done and ruled out acute cholecystitis., You were seen by surgery and gastroentorology.Upper endoscopy was done and showed gastric ulcer, oesophageal ulcer and gastritis. patient is recommended to continue on protonix and follow up with gastroenterologist Dr Edwards with biopsy result as an outpatient within 2 weeks after discharge.        SECONDARY DISCHARGE DIAGNOSES  Diagnosis: Diarrhea  Assessment and Plan of Treatment: You presented with diarrhea. C. difficile and other stool studies came back negative. You were started on ciprofloxacin and flagyl. Your condition improved.    Diagnosis: CAD (coronary artery disease)  Assessment and Plan of Treatment: You had past history of CAD (coronary artery disease). You were on brilinta , You are recommended to continue on it ,, You are recommended to follow up with your cardiologist after discharge.    Diagnosis: Prediabetes  Assessment and Plan of Treatment: Your HBA1C was 6.2 . You are in prediabetic range.  You should maintain healthy lifestyle by eating healthy low salt diet, avoid fatty food, weight loss, exercise regularly as tolerated 30 mins X 3 time per week and follow up with your PCP.    Diagnosis: BPH (benign prostatic hyperplasia)  Assessment and Plan of Treatment: You had past history of BPH (benign prostatic hyperplasia).  Continue with home medication and follow up with your PCP      Diagnosis: HTN (hypertension)  Assessment and Plan of Treatment: Blood Pressure Control , Please continue current medication regimen, and follow up with your PCP  - You have a history of Hypertension.   -You were  on metoprolol and furosimide.  - You should continue on the current antihypertensive regimen regularly(furosimide was modified to 20mg ).  - You blood pressure should be within 140-120/80-90.  - You should follow-up with your PCP within 1 week of your discharge for routine blood pressure monitoring at your next visit.  - Notify your doctor if you have any of the following symptoms:   (Dizziness, Lightheadedness, Blurry vision, Headache, Chest pain, Shortness of breath.)  - You should maintain healthy lifestyle by eating healthy low salt diet, avoid fatty food, weight loss, exercise regularly as tolerated 30 mins X 3 time per week.      Diagnosis: Hepatitis B  Assessment and Plan of Treatment: You had past history of Hepatitis B.  Continue with home medication entecavir and follow up with your PCP      Diagnosis: Chronic kidney disease (CKD)  Assessment and Plan of Treatment: You have past history of CKD , Your creatinine level was around the same base line. You are recommended to follow up with your PCP after discharge.     PRINCIPAL DISCHARGE DIAGNOSIS  Diagnosis: Gastrointestinal hemorrhage, unspecified gastrointestinal hemorrhage type  Assessment and Plan of Treatment: You presented with persistent dark diarrhea, You were seen by gastroenterologist. You were started on IV protonix twice daily . You are recommended to continue on protonix. CT abd was done and  revealed acute cholecystitis . HIDA scan was done and ruled out acute cholecystitis., You were seen by surgery and gastroentorology.Upper endoscopy was done and showed gastric ulcer, oesophageal ulcer and gastritis. patient is recommended to continue on protonix and follow up with gastroenterologist Dr Edwards with biopsy result as an outpatient within 2 weeks after discharge.        SECONDARY DISCHARGE DIAGNOSES  Diagnosis: Prediabetes  Assessment and Plan of Treatment: Your HBA1C was 6.2 . You are in prediabetic range. We discontinued your Lantus for now as blood glucose level during your stay was in acceptable range.  You should maintain healthy lifestyle by eating healthy low salt diet, avoid fatty food, weight loss, exercise regularly as tolerated 30 mins X 3 time per week and follow up with your PCP. Please monitor your blood glucose level.    Diagnosis: Hepatitis B  Assessment and Plan of Treatment: You had past history of Hepatitis B.  Continue with home medication entecavir and follow up with your PCP      Diagnosis: HTN (hypertension)  Assessment and Plan of Treatment: Blood Pressure Control , Please continue current medication regimen, and follow up with your PCP  - You have a history of Hypertension.   -You were  on metoprolol and furosimide.  - You should continue on the current antihypertensive regimen regularly(furosimide was modified to 20mg ).  - You blood pressure should be within 140-120/80-90.  - You should follow-up with your PCP within 1 week of your discharge for routine blood pressure monitoring at your next visit.  - Notify your doctor if you have any of the following symptoms:   (Dizziness, Lightheadedness, Blurry vision, Headache, Chest pain, Shortness of breath.)  - You should maintain healthy lifestyle by eating healthy low salt diet, avoid fatty food, weight loss, exercise regularly as tolerated 30 mins X 3 time per week.      Diagnosis: CAD (coronary artery disease)  Assessment and Plan of Treatment: You had past history of CAD (coronary artery disease). You were on brilinta , You are recommended to continue on it ,, You are recommended to follow up with your cardiologist after discharge.    Diagnosis: BPH (benign prostatic hyperplasia)  Assessment and Plan of Treatment: You had past history of BPH (benign prostatic hyperplasia).  Continue with home medication and follow up with your PCP      Diagnosis: Chronic kidney disease (CKD)  Assessment and Plan of Treatment: You have past history of CKD , Your creatinine level was around the same base line. You are recommended to follow up with your PCP after discharge.    Diagnosis: Diarrhea  Assessment and Plan of Treatment: You presented with diarrhea. C. difficile and other stool studies came back negative. You were started on ciprofloxacin and flagyl. Your condition improved.     PRINCIPAL DISCHARGE DIAGNOSIS  Diagnosis: Gastrointestinal hemorrhage, unspecified gastrointestinal hemorrhage type  Assessment and Plan of Treatment: You presented with persistent dark diarrhea, You were seen by gastroenterologist. You were started on IV protonix twice daily . You are recommended to continue on protonix. CT abd was done and  revealed acute cholecystitis . HIDA scan was done and ruled out acute cholecystitis., You were seen by surgery and gastroentorology.Upper endoscopy was done and showed gastric ulcer, oesophageal ulcer and gastritis. patient is recommended to continue on protonix and follow up with gastroenterologist Dr Edwards with biopsy result as an outpatient within 2 weeks after discharge. Please return to ER if you notice any active bleeding or black stool.      SECONDARY DISCHARGE DIAGNOSES  Diagnosis: Prediabetes  Assessment and Plan of Treatment: Your HBA1C was 6.2 . You are in prediabetic range. We discontinued your Lantus for now as blood glucose level during your stay was in acceptable range.  You should maintain healthy lifestyle by eating healthy low salt diet, avoid fatty food, weight loss, exercise regularly as tolerated 30 mins X 3 time per week and follow up with your PCP. Please monitor your blood glucose level.    Diagnosis: Hepatitis B  Assessment and Plan of Treatment: You had past history of Hepatitis B.  Continue with home medication entecavir and follow up with your PCP      Diagnosis: HTN (hypertension)  Assessment and Plan of Treatment: Blood Pressure Control , Please continue current medication regimen, and follow up with your PCP  - You have a history of Hypertension.   -You were  on metoprolol and furosimide.  - You should continue on the current antihypertensive regimen regularly(furosimide was modified to 20mg ).  - You blood pressure should be within 140-120/80-90.  - You should follow-up with your PCP within 1 week of your discharge for routine blood pressure monitoring at your next visit.  - Notify your doctor if you have any of the following symptoms:   (Dizziness, Lightheadedness, Blurry vision, Headache, Chest pain, Shortness of breath.)  - You should maintain healthy lifestyle by eating healthy low salt diet, avoid fatty food, weight loss, exercise regularly as tolerated 30 mins X 3 time per week.      Diagnosis: CAD (coronary artery disease)  Assessment and Plan of Treatment: You had past history of CAD (coronary artery disease). You were on brilinta , You are recommended to continue on it ,, You are recommended to follow up with your cardiologist after discharge. Please return to ER if you notice any active bleeding.    Diagnosis: BPH (benign prostatic hyperplasia)  Assessment and Plan of Treatment: You had past history of BPH (benign prostatic hyperplasia).  Continue with home medication and follow up with your PCP      Diagnosis: Chronic kidney disease (CKD)  Assessment and Plan of Treatment: You have past history of CKD , Your creatinine level was around the same base line. You are recommended to follow up with your PCP after discharge.    Diagnosis: Diarrhea  Assessment and Plan of Treatment: You presented with diarrhea. C. difficile and other stool studies came back negative. You were started on ciprofloxacin and flagyl. Your condition improved.

## 2020-11-09 NOTE — PROGRESS NOTE ADULT - SUBJECTIVE AND OBJECTIVE BOX
PGY-1 Progress Note discussed with attending    PAGER #: [[phone]] TILL 5:00 PM  PLEASE CONTACT ON CALL TEAM:  - On Call Team (Please refer to Carlton) FROM 5:00 PM - 8:30PM  - Nightfloat Team FROM 8:30 -7:30 AM    CHIEF COMPLAINT & BRIEF HOSPITAL COURSE:    INTERVAL HPI/OVERNIGHT EVENTS:       REVIEW OF SYSTEMS:  CONSTITUTIONAL: No fever, weight loss, or fatigue  RESPIRATORY: No cough, wheezing, chills or hemoptysis; No shortness of breath  CARDIOVASCULAR: No chest pain, palpitations, dizziness, or leg swelling  GASTROINTESTINAL: No abdominal pain. No nausea, vomiting, or hematemesis; No diarrhea or constipation. No melena or hematochezia.  GENITOURINARY: No dysuria or hematuria, urinary frequency  NEUROLOGICAL: No headaches, memory loss, loss of strength, numbness, or tremors  SKIN: No itching, burning, rashes, or lesions     MEDICATIONS  (STANDING):  atorvastatin 40 milliGRAM(s) Oral at bedtime  ciprofloxacin   IVPB 400 milliGRAM(s) IV Intermittent every 12 hours  entecavir 0.5 milliGRAM(s) Oral daily  ferrous    sulfate 325 milliGRAM(s) Oral daily  influenza   Vaccine 0.5 milliLiter(s) IntraMuscular once  melatonin 3 milliGRAM(s) Oral at bedtime  metoprolol tartrate 50 milliGRAM(s) Oral two times a day  metroNIDAZOLE    Tablet 500 milliGRAM(s) Oral every 8 hours  pantoprazole  Injectable 40 milliGRAM(s) IV Push every 12 hours  potassium chloride  10 mEq/100 mL IVPB 10 milliEquivalent(s) IV Intermittent once  tamsulosin 0.4 milliGRAM(s) Oral at bedtime    MEDICATIONS  (PRN):  acetaminophen   Tablet .. 650 milliGRAM(s) Oral every 6 hours PRN Mild Pain (1 - 3)      Vital Signs Last 24 Hrs  T(C): 36.8 (09 Nov 2020 06:12), Max: 36.8 (09 Nov 2020 06:12)  T(F): 98.2 (09 Nov 2020 06:12), Max: 98.2 (09 Nov 2020 06:12)  HR: 62 (09 Nov 2020 06:12) (56 - 65)  BP: 123/45 (09 Nov 2020 06:12) (123/45 - 149/41)  BP(mean): --  RR: 16 (09 Nov 2020 06:12) (16 - 19)  SpO2: 100% (09 Nov 2020 06:12) (94% - 100%)    PHYSICAL EXAMINATION:  GENERAL: NAD, well built  HEAD:  Atraumatic, Normocephalic  EYES:  conjunctiva and sclera clear  NECK: Supple, No JVD, Normal thyroid  CHEST/LUNG: Clear to auscultation. Clear to percussion bilaterally; No rales, rhonchi, wheezing, or rubs  HEART: Regular rate and rhythm; No murmurs, rubs, or gallops  ABDOMEN: Soft, Nontender, Nondistended; Bowel sounds present  NERVOUS SYSTEM:  Alert & Oriented X3,    EXTREMITIES:  2+ Peripheral Pulses, No clubbing, cyanosis, or edema  SKIN: warm dry                          10.4   6.37  )-----------( 223      ( 09 Nov 2020 06:22 )             33.2     11-09    141  |  109<H>  |  14  ----------------------------<  131<H>  3.8   |  24  |  1.38<H>    Ca    8.1<L>      09 Nov 2020 06:22  Phos  2.1     11-09  Mg     1.9     11-09                    CAPILLARY BLOOD GLUCOSE  CAPILLARY BLOOD GLUCOSE      POCT Blood Glucose.: 110 mg/dL (08 Nov 2020 21:27)    CAPILLARY BLOOD GLUCOSE      POCT Blood Glucose.: 110 mg/dL (08 Nov 2020 21:27)  POCT Blood Glucose.: 120 mg/dL (08 Nov 2020 11:38)      RADIOLOGY & ADDITIONAL TESTS:                   PGY-1 Progress Note discussed with attending    PAGER #: [[phone]] TILL 5:00 PM  PLEASE CONTACT ON CALL TEAM:  - On Call Team (Please refer to Carlton) FROM 5:00 PM - 8:30PM  - Nightfloat Team FROM 8:30 -7:30 AM    CHIEF COMPLAINT & BRIEF HOSPITAL COURSE:    Patient is an 85 year old Y[place] male, lives at home with wife, ambulates with walker, with PMH of CAD, last cardiac cath @ [facility] 06/2019 w/ PTCA proxLAD ISR, residual proxRCA 30-50% ISR, HTN, DM, CHFpEF presenting with chief complaint of diarrhea. Patient states that on 11/3, he ate raw oyster and started developing 5-6 episodes of dark watery bowel movements a day associated with some abdominal pain and nausea. Patient also endorsed chest pain at time of admission, but noted that this was a chronic issue. No SOB, abd pain resolved, no dizziness, fever, cough, vomiting, dizziness. While       INTERVAL HPI/OVERNIGHT EVENTS: Patient laying in bed comfortably, after his HIDA scan which was negative for acute cholecystitis. EGD to be performed possibly later today.       REVIEW OF SYSTEMS:  CONSTITUTIONAL: No fever, weight loss, or fatigue  RESPIRATORY: No cough, wheezing, chills or hemoptysis; No shortness of breath  CARDIOVASCULAR: No chest pain, palpitations, dizziness, or leg swelling  GASTROINTESTINAL: No abdominal pain. No nausea, vomiting, or hematemesis; No diarrhea or constipation. No melena or hematochezia.  GENITOURINARY: No dysuria or hematuria, urinary frequency  NEUROLOGICAL: No headaches, memory loss, loss of strength, numbness, or tremors  SKIN: No itching, burning, rashes, or lesions     MEDICATIONS  (STANDING):  atorvastatin 40 milliGRAM(s) Oral at bedtime  ciprofloxacin   IVPB 400 milliGRAM(s) IV Intermittent every 12 hours  entecavir 0.5 milliGRAM(s) Oral daily  ferrous    sulfate 325 milliGRAM(s) Oral daily  influenza   Vaccine 0.5 milliLiter(s) IntraMuscular once  melatonin 3 milliGRAM(s) Oral at bedtime  metoprolol tartrate 50 milliGRAM(s) Oral two times a day  metroNIDAZOLE    Tablet 500 milliGRAM(s) Oral every 8 hours  pantoprazole  Injectable 40 milliGRAM(s) IV Push every 12 hours  potassium chloride  10 mEq/100 mL IVPB 10 milliEquivalent(s) IV Intermittent once  tamsulosin 0.4 milliGRAM(s) Oral at bedtime    MEDICATIONS  (PRN):  acetaminophen   Tablet .. 650 milliGRAM(s) Oral every 6 hours PRN Mild Pain (1 - 3)      Vital Signs Last 24 Hrs  T(C): 36.8 (09 Nov 2020 06:12), Max: 36.8 (09 Nov 2020 06:12)  T(F): 98.2 (09 Nov 2020 06:12), Max: 98.2 (09 Nov 2020 06:12)  HR: 62 (09 Nov 2020 06:12) (56 - 65)  BP: 123/45 (09 Nov 2020 06:12) (123/45 - 149/41)  BP(mean): --  RR: 16 (09 Nov 2020 06:12) (16 - 19)  SpO2: 100% (09 Nov 2020 06:12) (94% - 100%)    PHYSICAL EXAMINATION:  GENERAL: NAD, well built  HEAD:  Atraumatic, Normocephalic  EYES:  conjunctiva and sclera clear  NECK: Supple, No JVD, Normal thyroid  CHEST/LUNG: Clear to auscultation. Clear to percussion bilaterally; No rales, rhonchi, wheezing, or rubs  HEART: Regular rate and rhythm; No murmurs, rubs, or gallops  ABDOMEN: Soft, Nontender, Nondistended; Bowel sounds present  NERVOUS SYSTEM:  Alert & Oriented X3,    EXTREMITIES:  2+ Peripheral Pulses, No clubbing, cyanosis, or edema  SKIN: warm dry                          10.4   6.37  )-----------( 223      ( 09 Nov 2020 06:22 )             33.2     11-09    141  |  109<H>  |  14  ----------------------------<  131<H>  3.8   |  24  |  1.38<H>    Ca    8.1<L>      09 Nov 2020 06:22  Phos  2.1     11-09  Mg     1.9     11-09                    CAPILLARY BLOOD GLUCOSE  CAPILLARY BLOOD GLUCOSE      POCT Blood Glucose.: 110 mg/dL (08 Nov 2020 21:27)    CAPILLARY BLOOD GLUCOSE      POCT Blood Glucose.: 110 mg/dL (08 Nov 2020 21:27)  POCT Blood Glucose.: 120 mg/dL (08 Nov 2020 11:38)      RADIOLOGY & ADDITIONAL TESTS:                   PGY-1 Progress Note discussed with attending    PAGER #: [[phone]] TILL 5:00 PM  PLEASE CONTACT ON CALL TEAM:  - On Call Team (Please refer to Carlton) FROM 5:00 PM - 8:30PM  - Nightfloat Team FROM 8:30 -7:30 AM    CHIEF COMPLAINT & BRIEF HOSPITAL COURSE:    Patient is an 85 year old [place] male, lives at home with wife, ambulates with walker, with PMH of CAD, last cardiac cath @ [facility] 06/2019 w/ PTCA proxLAD ISR, residual proxRCA 30-50% ISR, HTN, DM, CHFpEF presenting with chief complaint of diarrhea. Patient states that on 11/3, he ate raw oyster and started developing 5-6 episodes of dark watery bowel movements a day associated with some abdominal pain and nausea. Patient also endorsed chest pain at time of admission, but noted that this was a chronic issue. No SOB, abd pain resolved, no dizziness, fever, cough, vomiting, dizziness. While       INTERVAL HPI/OVERNIGHT EVENTS:   patient examined bed side, Patient laying in bed comfortably, EGD to be performed possibly later today after HIDA scan .      REVIEW OF SYSTEMS:  CONSTITUTIONAL: No fever, weight loss, or fatigue  RESPIRATORY: No cough, wheezing, chills or hemoptysis; No shortness of breath  CARDIOVASCULAR: No chest pain, palpitations, dizziness, or leg swelling  GASTROINTESTINAL: No abdominal pain. No nausea, vomiting, or hematemesis; No diarrhea or constipation. No melena or hematochezia.  GENITOURINARY: No dysuria or hematuria, urinary frequency  NEUROLOGICAL: No headaches, memory loss, loss of strength, numbness, or tremors  SKIN: No itching, burning, rashes, or lesions     MEDICATIONS  (STANDING):  atorvastatin 40 milliGRAM(s) Oral at bedtime  ciprofloxacin   IVPB 400 milliGRAM(s) IV Intermittent every 12 hours  entecavir 0.5 milliGRAM(s) Oral daily  ferrous    sulfate 325 milliGRAM(s) Oral daily  influenza   Vaccine 0.5 milliLiter(s) IntraMuscular once  melatonin 3 milliGRAM(s) Oral at bedtime  metoprolol tartrate 50 milliGRAM(s) Oral two times a day  metroNIDAZOLE    Tablet 500 milliGRAM(s) Oral every 8 hours  pantoprazole  Injectable 40 milliGRAM(s) IV Push every 12 hours  potassium chloride  10 mEq/100 mL IVPB 10 milliEquivalent(s) IV Intermittent once  tamsulosin 0.4 milliGRAM(s) Oral at bedtime    MEDICATIONS  (PRN):  acetaminophen   Tablet .. 650 milliGRAM(s) Oral every 6 hours PRN Mild Pain (1 - 3)      Vital Signs Last 24 Hrs  T(C): 36.8 (09 Nov 2020 06:12), Max: 36.8 (09 Nov 2020 06:12)  T(F): 98.2 (09 Nov 2020 06:12), Max: 98.2 (09 Nov 2020 06:12)  HR: 62 (09 Nov 2020 06:12) (56 - 65)  BP: 123/45 (09 Nov 2020 06:12) (123/45 - 149/41)  BP(mean): --  RR: 16 (09 Nov 2020 06:12) (16 - 19)  SpO2: 100% (09 Nov 2020 06:12) (94% - 100%)    PHYSICAL EXAMINATION:  GENERAL: NAD, well built  HEAD:  Atraumatic, Normocephalic  EYES:  conjunctiva and sclera clear  NECK: Supple, No JVD, Normal thyroid  CHEST/LUNG: Clear to auscultation. Clear to percussion bilaterally; No rales, rhonchi, wheezing, or rubs  HEART: Regular rate and rhythm; No murmurs, rubs, or gallops  ABDOMEN: Soft, Nontender, Nondistended; Bowel sounds present  NERVOUS SYSTEM:  Alert & Oriented X3,    EXTREMITIES:  2+ Peripheral Pulses, No clubbing, cyanosis, or edema  SKIN: warm dry                          10.4   6.37  )-----------( 223      ( 09 Nov 2020 06:22 )             33.2     11-09    141  |  109<H>  |  14  ----------------------------<  131<H>  3.8   |  24  |  1.38<H>    Ca    8.1<L>      09 Nov 2020 06:22  Phos  2.1     11-09  Mg     1.9     11-09                    CAPILLARY BLOOD GLUCOSE  CAPILLARY BLOOD GLUCOSE      POCT Blood Glucose.: 110 mg/dL (08 Nov 2020 21:27)    CAPILLARY BLOOD GLUCOSE      POCT Blood Glucose.: 110 mg/dL (08 Nov 2020 21:27)  POCT Blood Glucose.: 120 mg/dL (08 Nov 2020 11:38)      RADIOLOGY & ADDITIONAL TESTS:                   PGY-1 Progress Note discussed with attending    PAGER #: [[phone]] TILL 5:00 PM  PLEASE CONTACT ON CALL TEAM:  - On Call Team (Please refer to Carlton) FROM 5:00 PM - 8:30PM  - Nightfloat Team FROM 8:30 -7:30 AM    CHIEF COMPLAINT & BRIEF HOSPITAL COURSE:    Patient is an 85 year old [place] male, lives at home with wife, ambulates with walker, with PMH of CAD, last cardiac cath @ [facility] 06/2019 w/ PTCA proxLAD ISR, residual proxRCA 30-50% ISR, HTN, DM, CHFpEF presenting with chief complaint of diarrhea. Patient states that on 11/3, he ate raw oyster and started developing 5-6 episodes of dark watery bowel movements a day associated with some abdominal pain and nausea. Patient also endorsed chest pain at time of admission, but noted that this was a chronic issue. No SOB, abd pain resolved, no dizziness, fever, cough, vomiting, dizziness. During his hospital stay, stool studies were performed which were negative for C. Diff. He has been on a CLD. He is being followed by GI and surgery. HIDA scan was negative for acute cholecystitis. Repeat echo shows no significant changes since 2018.       INTERVAL HPI/OVERNIGHT EVENTS:   patient examined bed side, Patient laying in bed comfortably, EGD to be performed possibly later today after HIDA scan. States that he feels that his abdominal pain is improved since admission. Denies any stools in the last 24 hours, which he says is likely as he has been NPO.       REVIEW OF SYSTEMS:  CONSTITUTIONAL: No fever, weight loss, or fatigue  RESPIRATORY: No cough, wheezing, chills or hemoptysis; No shortness of breath  CARDIOVASCULAR: No chest pain, palpitations, dizziness, or leg swelling  GASTROINTESTINAL: No abdominal pain. No nausea, vomiting, or hematemesis; No diarrhea or constipation. No melena or hematochezia.  GENITOURINARY: No dysuria or hematuria, urinary frequency  NEUROLOGICAL: No headaches, memory loss, loss of strength, numbness, or tremors  SKIN: No itching, burning, rashes, or lesions     MEDICATIONS  (STANDING):  atorvastatin 40 milliGRAM(s) Oral at bedtime  ciprofloxacin   IVPB 400 milliGRAM(s) IV Intermittent every 12 hours  entecavir 0.5 milliGRAM(s) Oral daily  ferrous    sulfate 325 milliGRAM(s) Oral daily  influenza   Vaccine 0.5 milliLiter(s) IntraMuscular once  melatonin 3 milliGRAM(s) Oral at bedtime  metoprolol tartrate 50 milliGRAM(s) Oral two times a day  metroNIDAZOLE    Tablet 500 milliGRAM(s) Oral every 8 hours  pantoprazole  Injectable 40 milliGRAM(s) IV Push every 12 hours  potassium chloride  10 mEq/100 mL IVPB 10 milliEquivalent(s) IV Intermittent once  tamsulosin 0.4 milliGRAM(s) Oral at bedtime    MEDICATIONS  (PRN):  acetaminophen   Tablet .. 650 milliGRAM(s) Oral every 6 hours PRN Mild Pain (1 - 3)      Vital Signs Last 24 Hrs  T(C): 36.8 (09 Nov 2020 06:12), Max: 36.8 (09 Nov 2020 06:12)  T(F): 98.2 (09 Nov 2020 06:12), Max: 98.2 (09 Nov 2020 06:12)  HR: 62 (09 Nov 2020 06:12) (56 - 65)  BP: 123/45 (09 Nov 2020 06:12) (123/45 - 149/41)  BP(mean): --  RR: 16 (09 Nov 2020 06:12) (16 - 19)  SpO2: 100% (09 Nov 2020 06:12) (94% - 100%)    PHYSICAL EXAMINATION:  GENERAL: NAD, well built  HEAD:  Atraumatic, Normocephalic  EYES:  conjunctiva and sclera clear  NECK: Supple, No JVD, Normal thyroid  CHEST/LUNG: Clear to auscultation. Clear to percussion bilaterally; No rales, rhonchi, wheezing, or rubs  HEART: Regular rate and rhythm; No murmurs, rubs, or gallops  ABDOMEN: Soft, Nontender, Nondistended; Bowel sounds present  NERVOUS SYSTEM:  Alert & Oriented X3,    EXTREMITIES:  2+ Peripheral Pulses, No clubbing, cyanosis, or edema  SKIN: warm dry                          10.4   6.37  )-----------( 223      ( 09 Nov 2020 06:22 )             33.2     11-09    141  |  109<H>  |  14  ----------------------------<  131<H>  3.8   |  24  |  1.38<H>    Ca    8.1<L>      09 Nov 2020 06:22  Phos  2.1     11-09  Mg     1.9     11-09                    CAPILLARY BLOOD GLUCOSE  CAPILLARY BLOOD GLUCOSE      POCT Blood Glucose.: 110 mg/dL (08 Nov 2020 21:27)    CAPILLARY BLOOD GLUCOSE      POCT Blood Glucose.: 110 mg/dL (08 Nov 2020 21:27)  POCT Blood Glucose.: 120 mg/dL (08 Nov 2020 11:38)      RADIOLOGY & ADDITIONAL TESTS:                   PGY-1 Progress Note discussed with attending    PAGER #: [[phone]] TILL 5:00 PM  PLEASE CONTACT ON CALL TEAM:  - On Call Team (Please refer to Carlton) FROM 5:00 PM - 8:30PM  - Nightfloat Team FROM 8:30 -7:30 AM    CHIEF COMPLAINT & BRIEF HOSPITAL COURSE:    Patient is an 85 year old Yi male, lives at home with wife, ambulates with walker, with PMH of CAD, last cardiac cath @ [facility] 06/2019 w/ PTCA proxLAD ISR, residual proxRCA 30-50% ISR, HTN, DM, CHFpEF presenting with chief complaint of diarrhea. Patient states that on 11/3, he ate raw oyster and started developing 5-6 episodes of dark watery bowel movements a day associated with some abdominal pain and nausea. Patient also endorsed chest pain at time of admission, but noted that this was a chronic issue. No SOB, abd pain resolved, no dizziness, fever, cough, vomiting, dizziness. On admission, was started on Cipro and Flagyl. Stool studies were performed which were negative for C. Diff. He has been on a CLD. He is being followed by GI and surgery. HIDA scan ordered after CT abdomen in the ED revealed acute sameer; HIDA scan was negative for acute cholecystitis, now due for EGD. Repeat echo shows no significant changes since 2018, normal EF.        INTERVAL HPI/OVERNIGHT EVENTS:   patient examined bed side, Patient laying in bed comfortably, EGD to be performed possibly later today after HIDA scan. States that he feels that his abdominal pain is improved since admission. Denies any stools in the last 24 hours, which he says is likely as he has been NPO.       REVIEW OF SYSTEMS:  CONSTITUTIONAL: No fever, weight loss, or fatigue  RESPIRATORY: No cough, wheezing, chills or hemoptysis; No shortness of breath  CARDIOVASCULAR: No chest pain, palpitations, dizziness, or leg swelling  GASTROINTESTINAL: No abdominal pain. No nausea, vomiting, or hematemesis; No diarrhea or constipation. No melena or hematochezia.  GENITOURINARY: No dysuria or hematuria, urinary frequency  NEUROLOGICAL: No headaches, memory loss, loss of strength, numbness, or tremors  SKIN: No itching, burning, rashes, or lesions     MEDICATIONS  (STANDING):  atorvastatin 40 milliGRAM(s) Oral at bedtime  ciprofloxacin   IVPB 400 milliGRAM(s) IV Intermittent every 12 hours  entecavir 0.5 milliGRAM(s) Oral daily  ferrous    sulfate 325 milliGRAM(s) Oral daily  influenza   Vaccine 0.5 milliLiter(s) IntraMuscular once  melatonin 3 milliGRAM(s) Oral at bedtime  metoprolol tartrate 50 milliGRAM(s) Oral two times a day  metroNIDAZOLE    Tablet 500 milliGRAM(s) Oral every 8 hours  pantoprazole  Injectable 40 milliGRAM(s) IV Push every 12 hours  potassium chloride  10 mEq/100 mL IVPB 10 milliEquivalent(s) IV Intermittent once  tamsulosin 0.4 milliGRAM(s) Oral at bedtime    MEDICATIONS  (PRN):  acetaminophen   Tablet .. 650 milliGRAM(s) Oral every 6 hours PRN Mild Pain (1 - 3)      Vital Signs Last 24 Hrs  T(C): 36.8 (09 Nov 2020 06:12), Max: 36.8 (09 Nov 2020 06:12)  T(F): 98.2 (09 Nov 2020 06:12), Max: 98.2 (09 Nov 2020 06:12)  HR: 62 (09 Nov 2020 06:12) (56 - 65)  BP: 123/45 (09 Nov 2020 06:12) (123/45 - 149/41)  BP(mean): --  RR: 16 (09 Nov 2020 06:12) (16 - 19)  SpO2: 100% (09 Nov 2020 06:12) (94% - 100%)    PHYSICAL EXAMINATION:  GENERAL: NAD, well built  HEAD:  Atraumatic, Normocephalic  EYES:  conjunctiva and sclera clear  NECK: Supple, No JVD, Normal thyroid  CHEST/LUNG: Clear to auscultation. Clear to percussion bilaterally; No rales, rhonchi, wheezing, or rubs  HEART: Regular rate and rhythm; No murmurs, rubs, or gallops  ABDOMEN: Soft, Nontender, Nondistended; Bowel sounds present  NERVOUS SYSTEM:  Alert & Oriented X3,    EXTREMITIES:  2+ Peripheral Pulses, No clubbing, cyanosis, or edema  SKIN: warm dry                          10.4   6.37  )-----------( 223      ( 09 Nov 2020 06:22 )             33.2     11-09    141  |  109<H>  |  14  ----------------------------<  131<H>  3.8   |  24  |  1.38<H>    Ca    8.1<L>      09 Nov 2020 06:22  Phos  2.1     11-09  Mg     1.9     11-09                    CAPILLARY BLOOD GLUCOSE  CAPILLARY BLOOD GLUCOSE      POCT Blood Glucose.: 110 mg/dL (08 Nov 2020 21:27)    CAPILLARY BLOOD GLUCOSE      POCT Blood Glucose.: 110 mg/dL (08 Nov 2020 21:27)  POCT Blood Glucose.: 120 mg/dL (08 Nov 2020 11:38)      RADIOLOGY & ADDITIONAL TESTS:

## 2020-11-09 NOTE — PROGRESS NOTE ADULT - PROBLEM SELECTOR PLAN 5
Last A1C 2019 not in diabetic range  Will hold insulin sliding scale, start POCT glucose q6h  Follow up glucose readings and a1c Last A1C 2019 not in diabetic range  a1c 6.2

## 2020-11-10 LAB
ALBUMIN SERPL ELPH-MCNC: 2.2 G/DL — LOW (ref 3.5–5)
ALP SERPL-CCNC: 119 U/L — SIGNIFICANT CHANGE UP (ref 40–120)
ALT FLD-CCNC: 11 U/L DA — SIGNIFICANT CHANGE UP (ref 10–60)
ANION GAP SERPL CALC-SCNC: 6 MMOL/L — SIGNIFICANT CHANGE UP (ref 5–17)
AST SERPL-CCNC: 17 U/L — SIGNIFICANT CHANGE UP (ref 10–40)
BILIRUB SERPL-MCNC: 0.2 MG/DL — SIGNIFICANT CHANGE UP (ref 0.2–1.2)
BUN SERPL-MCNC: 15 MG/DL — SIGNIFICANT CHANGE UP (ref 7–18)
CALCIUM SERPL-MCNC: 8.2 MG/DL — LOW (ref 8.4–10.5)
CHLORIDE SERPL-SCNC: 110 MMOL/L — HIGH (ref 96–108)
CO2 SERPL-SCNC: 25 MMOL/L — SIGNIFICANT CHANGE UP (ref 22–31)
CREAT SERPL-MCNC: 1.48 MG/DL — HIGH (ref 0.5–1.3)
GLUCOSE SERPL-MCNC: 97 MG/DL — SIGNIFICANT CHANGE UP (ref 70–99)
HCT VFR BLD CALC: 33.7 % — LOW (ref 39–50)
HGB BLD-MCNC: 10.5 G/DL — LOW (ref 13–17)
MAGNESIUM SERPL-MCNC: 2.2 MG/DL — SIGNIFICANT CHANGE UP (ref 1.6–2.6)
MCHC RBC-ENTMCNC: 31.2 GM/DL — LOW (ref 32–36)
MCHC RBC-ENTMCNC: 31.4 PG — SIGNIFICANT CHANGE UP (ref 27–34)
MCV RBC AUTO: 100.9 FL — HIGH (ref 80–100)
NRBC # BLD: 0 /100 WBCS — SIGNIFICANT CHANGE UP (ref 0–0)
PHOSPHATE SERPL-MCNC: 2.4 MG/DL — LOW (ref 2.5–4.5)
PLATELET # BLD AUTO: 225 K/UL — SIGNIFICANT CHANGE UP (ref 150–400)
POTASSIUM SERPL-MCNC: 3.9 MMOL/L — SIGNIFICANT CHANGE UP (ref 3.5–5.3)
POTASSIUM SERPL-SCNC: 3.9 MMOL/L — SIGNIFICANT CHANGE UP (ref 3.5–5.3)
PROT SERPL-MCNC: 6.1 G/DL — SIGNIFICANT CHANGE UP (ref 6–8.3)
RBC # BLD: 3.34 M/UL — LOW (ref 4.2–5.8)
RBC # FLD: 14 % — SIGNIFICANT CHANGE UP (ref 10.3–14.5)
SODIUM SERPL-SCNC: 141 MMOL/L — SIGNIFICANT CHANGE UP (ref 135–145)
WBC # BLD: 6.23 K/UL — SIGNIFICANT CHANGE UP (ref 3.8–10.5)
WBC # FLD AUTO: 6.23 K/UL — SIGNIFICANT CHANGE UP (ref 3.8–10.5)

## 2020-11-10 PROCEDURE — 99232 SBSQ HOSP IP/OBS MODERATE 35: CPT | Mod: GC

## 2020-11-10 RX ADMIN — PANTOPRAZOLE SODIUM 40 MILLIGRAM(S): 20 TABLET, DELAYED RELEASE ORAL at 08:12

## 2020-11-10 RX ADMIN — PANTOPRAZOLE SODIUM 40 MILLIGRAM(S): 20 TABLET, DELAYED RELEASE ORAL at 21:00

## 2020-11-10 RX ADMIN — ENTECAVIR 0.5 MILLIGRAM(S): 0.5 TABLET ORAL at 12:01

## 2020-11-10 RX ADMIN — Medication 3 MILLIGRAM(S): at 22:10

## 2020-11-10 RX ADMIN — Medication 50 MILLIGRAM(S): at 21:00

## 2020-11-10 RX ADMIN — Medication 50 MILLIGRAM(S): at 08:13

## 2020-11-10 RX ADMIN — TAMSULOSIN HYDROCHLORIDE 0.4 MILLIGRAM(S): 0.4 CAPSULE ORAL at 00:13

## 2020-11-10 RX ADMIN — TAMSULOSIN HYDROCHLORIDE 0.4 MILLIGRAM(S): 0.4 CAPSULE ORAL at 22:10

## 2020-11-10 RX ADMIN — ATORVASTATIN CALCIUM 40 MILLIGRAM(S): 80 TABLET, FILM COATED ORAL at 22:10

## 2020-11-10 RX ADMIN — Medication 325 MILLIGRAM(S): at 12:02

## 2020-11-10 NOTE — PROGRESS NOTE ADULT - SUBJECTIVE AND OBJECTIVE BOX
PGY-1 Progress Note discussed with attending    PAGER #: [[phone]] TILL 5:00 PM  PLEASE CONTACT ON CALL TEAM:  - On Call Team (Please refer to Carlton) FROM 5:00 PM - 8:30PM  - Nightfloat Team FROM 8:30 -7:30 AM    CHIEF COMPLAINT & BRIEF HOSPITAL COURSE:    INTERVAL HPI/OVERNIGHT EVENTS:       REVIEW OF SYSTEMS:  CONSTITUTIONAL: No fever, weight loss, or fatigue  RESPIRATORY: No cough, wheezing, chills or hemoptysis; No shortness of breath  CARDIOVASCULAR: No chest pain, palpitations, dizziness, or leg swelling  GASTROINTESTINAL: No abdominal pain. No nausea, vomiting, or hematemesis; No diarrhea or constipation. No melena or hematochezia.  GENITOURINARY: No dysuria or hematuria, urinary frequency  NEUROLOGICAL: No headaches, memory loss, loss of strength, numbness, or tremors  SKIN: No itching, burning, rashes, or lesions     MEDICATIONS  (STANDING):  atorvastatin 40 milliGRAM(s) Oral at bedtime  entecavir 0.5 milliGRAM(s) Oral daily  ferrous    sulfate 325 milliGRAM(s) Oral daily  influenza   Vaccine 0.5 milliLiter(s) IntraMuscular once  melatonin 3 milliGRAM(s) Oral at bedtime  metoprolol tartrate 50 milliGRAM(s) Oral two times a day  pantoprazole  Injectable 40 milliGRAM(s) IV Push every 12 hours  potassium chloride  10 mEq/100 mL IVPB 10 milliEquivalent(s) IV Intermittent once  tamsulosin 0.4 milliGRAM(s) Oral at bedtime    MEDICATIONS  (PRN):  acetaminophen   Tablet .. 650 milliGRAM(s) Oral every 6 hours PRN Mild Pain (1 - 3)      Vital Signs Last 24 Hrs  T(C): 36.6 (10 Nov 2020 05:11), Max: 36.6 (10 Nov 2020 05:11)  T(F): 97.9 (10 Nov 2020 05:11), Max: 97.9 (10 Nov 2020 05:11)  HR: 60 (10 Nov 2020 05:11) (55 - 60)  BP: 147/40 (10 Nov 2020 05:11) (140/48 - 150/43)  BP(mean): --  RR: 17 (10 Nov 2020 05:11) (16 - 17)  SpO2: 96% (10 Nov 2020 05:11) (96% - 99%)    PHYSICAL EXAMINATION:  GENERAL: NAD, well built  HEAD:  Atraumatic, Normocephalic  EYES:  conjunctiva and sclera clear  NECK: Supple, No JVD, Normal thyroid  CHEST/LUNG: Clear to auscultation. Clear to percussion bilaterally; No rales, rhonchi, wheezing, or rubs  HEART: Regular rate and rhythm; No murmurs, rubs, or gallops  ABDOMEN: Soft, Nontender, Nondistended; Bowel sounds present  NERVOUS SYSTEM:  Alert & Oriented X3,    EXTREMITIES:  2+ Peripheral Pulses, No clubbing, cyanosis, or edema  SKIN: warm dry                          10.5   6.23  )-----------( 225      ( 10 Nov 2020 06:50 )             33.7     11-10    141  |  110<H>  |  15  ----------------------------<  97  3.9   |  25  |  1.48<H>    Ca    8.2<L>      10 Nov 2020 06:50  Phos  2.4     11-10  Mg     2.2     11-10    TPro  6.1  /  Alb  2.2<L>  /  TBili  0.2  /  DBili  x   /  AST  17  /  ALT  11  /  AlkPhos  119  11-10    LIVER FUNCTIONS - ( 10 Nov 2020 06:50 )  Alb: 2.2 g/dL / Pro: 6.1 g/dL / ALK PHOS: 119 U/L / ALT: 11 U/L DA / AST: 17 U/L / GGT: x                       CAPILLARY BLOOD GLUCOSE  CAPILLARY BLOOD GLUCOSE      POCT Blood Glucose.: 110 mg/dL (08 Nov 2020 21:27)    CAPILLARY BLOOD GLUCOSE          RADIOLOGY & ADDITIONAL TESTS:                   PGY-1 Progress Note discussed with attending    PAGER #: [[phone]] TILL 5:00 PM  PLEASE CONTACT ON CALL TEAM:  - On Call Team (Please refer to Carlton) FROM 5:00 PM - 8:30PM  - Nightfloat Team FROM 8:30 -7:30 AM    CHIEF COMPLAINT & BRIEF HOSPITAL COURSE:    Patient is an 85 year old [place] male, lives at home with wife, ambulates with walker, with PMH of CAD, last cardiac cath @ [facility] 06/2019 w/ PTCA proxLAD ISR, residual proxRCA 30-50% ISR, HTN, DM, CHFpEF presenting with chief complaint of diarrhea. Patient states that on 11/3, he ate raw oyster and started developing 5-6 episodes of dark watery bowel movements a day associated with some abdominal pain and nausea. Patient also endorsed chest pain at time of admission, but noted that this was a chronic issue. No SOB, abd pain resolved, no dizziness, fever, cough, vomiting, dizziness. On admission, was started on Cipro and Flagyl. Stool studies were performed which were negative for C. Diff. He has been on a CLD. He is being followed by GI and surgery. HIDA scan ordered after CT abdomen in the ED revealed acute sameer; HIDA scan was negative for acute cholecystitis. EGD shows gastric, esophageal ulcers. Repeat echo shows no significant changes since 2018, normal EF.        INTERVAL HPI/OVERNIGHT EVENTS:   patient examined bed side, Patient laying in bed comfortably. Continues to note that he is in no discomfort. States that he has not had a BM for the last 3 days, and he attributes it to not having had solid food. EGD shows gastric, esophageal ulcers.    REVIEW OF SYSTEMS:  CONSTITUTIONAL: No fever, weight loss, or fatigue  RESPIRATORY: No cough, wheezing, chills or hemoptysis; No shortness of breath  CARDIOVASCULAR: No chest pain, palpitations, dizziness, or leg swelling  GASTROINTESTINAL: No abdominal pain. No nausea, vomiting, or hematemesis; No diarrhea or constipation. No melena or hematochezia.  GENITOURINARY: No dysuria or hematuria, urinary frequency  NEUROLOGICAL: No headaches, memory loss, loss of strength, numbness, or tremors  SKIN: No itching, burning, rashes, or lesions     MEDICATIONS  (STANDING):  atorvastatin 40 milliGRAM(s) Oral at bedtime  entecavir 0.5 milliGRAM(s) Oral daily  ferrous    sulfate 325 milliGRAM(s) Oral daily  influenza   Vaccine 0.5 milliLiter(s) IntraMuscular once  melatonin 3 milliGRAM(s) Oral at bedtime  metoprolol tartrate 50 milliGRAM(s) Oral two times a day  pantoprazole  Injectable 40 milliGRAM(s) IV Push every 12 hours  potassium chloride  10 mEq/100 mL IVPB 10 milliEquivalent(s) IV Intermittent once  tamsulosin 0.4 milliGRAM(s) Oral at bedtime    MEDICATIONS  (PRN):  acetaminophen   Tablet .. 650 milliGRAM(s) Oral every 6 hours PRN Mild Pain (1 - 3)      Vital Signs Last 24 Hrs  T(C): 36.6 (10 Nov 2020 05:11), Max: 36.6 (10 Nov 2020 05:11)  T(F): 97.9 (10 Nov 2020 05:11), Max: 97.9 (10 Nov 2020 05:11)  HR: 60 (10 Nov 2020 05:11) (55 - 60)  BP: 147/40 (10 Nov 2020 05:11) (140/48 - 150/43)  BP(mean): --  RR: 17 (10 Nov 2020 05:11) (16 - 17)  SpO2: 96% (10 Nov 2020 05:11) (96% - 99%)    PHYSICAL EXAMINATION:  GENERAL: NAD, well built  HEAD:  Atraumatic, Normocephalic  EYES:  conjunctiva and sclera clear  CHEST/LUNG: Clear to auscultation. No rales, rhonchi, wheezing, or rubs  HEART: Regular rate and rhythm; No murmurs, rubs, or gallops  ABDOMEN: Soft, Nontender, Nondistended; Bowel sounds present  NERVOUS SYSTEM:  Alert & Oriented X3.    EXTREMITIES:  2+ Peripheral Pulses, No clubbing, cyanosis, or edema  SKIN: warm dry                          10.5   6.23  )-----------( 225      ( 10 Nov 2020 06:50 )             33.7     11-10    141  |  110<H>  |  15  ----------------------------<  97  3.9   |  25  |  1.48<H>    Ca    8.2<L>      10 Nov 2020 06:50  Phos  2.4     11-10  Mg     2.2     11-10    TPro  6.1  /  Alb  2.2<L>  /  TBili  0.2  /  DBili  x   /  AST  17  /  ALT  11  /  AlkPhos  119  11-10    LIVER FUNCTIONS - ( 10 Nov 2020 06:50 )  Alb: 2.2 g/dL / Pro: 6.1 g/dL / ALK PHOS: 119 U/L / ALT: 11 U/L DA / AST: 17 U/L / GGT: x                       CAPILLARY BLOOD GLUCOSE  CAPILLARY BLOOD GLUCOSE      POCT Blood Glucose.: 110 mg/dL (08 Nov 2020 21:27)    CAPILLARY BLOOD GLUCOSE          RADIOLOGY & ADDITIONAL TESTS:                   PGY-1 Progress Note discussed with attending    PAGER #: [[phone]] TILL 5:00 PM  PLEASE CONTACT ON CALL TEAM:  - On Call Team (Please refer to Carlton) FROM 5:00 PM - 8:30PM  - Nightfloat Team FROM 8:30 -7:30 AM    CHIEF COMPLAINT & BRIEF HOSPITAL COURSE:    Patient is an 85 year old [place] male, lives at home with wife, ambulates with walker, with PMH of CAD, last cardiac cath @ [facility] 06/2019 w/ PTCA proxLAD ISR, residual proxRCA 30-50% ISR, HTN, DM, CHFpEF presenting with chief complaint of diarrhea. Patient states that on 11/3, he ate raw oyster and started developing 5-6 episodes of dark watery bowel movements a day associated with some abdominal pain and nausea. Patient also endorsed chest pain at time of admission, but noted that this was a chronic issue. No SOB, abd pain resolved, no dizziness, fever, cough, vomiting, dizziness. On admission, was started on Cipro and Flagyl. Stool studies were performed which were negative for C. Diff. He has been on a CLD. He is being followed by GI and surgery. HIDA scan ordered after CT abdomen in the ED revealed acute sameer; HIDA scan was negative for acute cholecystitis. EGD shows gastric, esophageal ulcers. Repeat echo shows no significant changes since 2018, normal EF.        INTERVAL HPI/OVERNIGHT EVENTS:   patient examined bed side, Patient laying in bed comfortably.  States that he has not had a BM for the last 3 days, and he attributes it to not having had solid food.     REVIEW OF SYSTEMS:  CONSTITUTIONAL: No fever, weight loss, or fatigue  RESPIRATORY: No cough, wheezing, chills or hemoptysis; No shortness of breath  CARDIOVASCULAR: No chest pain, palpitations, dizziness, or leg swelling  GASTROINTESTINAL: No abdominal pain. No nausea, vomiting, or hematemesis; No diarrhea or constipation. No melena or hematochezia.  GENITOURINARY: No dysuria or hematuria, urinary frequency  NEUROLOGICAL: No headaches, memory loss, loss of strength, numbness, or tremors  SKIN: No itching, burning, rashes, or lesions     MEDICATIONS  (STANDING):  atorvastatin 40 milliGRAM(s) Oral at bedtime  entecavir 0.5 milliGRAM(s) Oral daily  ferrous    sulfate 325 milliGRAM(s) Oral daily  influenza   Vaccine 0.5 milliLiter(s) IntraMuscular once  melatonin 3 milliGRAM(s) Oral at bedtime  metoprolol tartrate 50 milliGRAM(s) Oral two times a day  pantoprazole  Injectable 40 milliGRAM(s) IV Push every 12 hours  potassium chloride  10 mEq/100 mL IVPB 10 milliEquivalent(s) IV Intermittent once  tamsulosin 0.4 milliGRAM(s) Oral at bedtime    MEDICATIONS  (PRN):  acetaminophen   Tablet .. 650 milliGRAM(s) Oral every 6 hours PRN Mild Pain (1 - 3)      Vital Signs Last 24 Hrs  T(C): 36.6 (10 Nov 2020 05:11), Max: 36.6 (10 Nov 2020 05:11)  T(F): 97.9 (10 Nov 2020 05:11), Max: 97.9 (10 Nov 2020 05:11)  HR: 60 (10 Nov 2020 05:11) (55 - 60)  BP: 147/40 (10 Nov 2020 05:11) (140/48 - 150/43)  BP(mean): --  RR: 17 (10 Nov 2020 05:11) (16 - 17)  SpO2: 96% (10 Nov 2020 05:11) (96% - 99%)    PHYSICAL EXAMINATION:  GENERAL: NAD, well built  HEAD:  Atraumatic, Normocephalic  EYES:  conjunctiva and sclera clear  CHEST/LUNG: Clear to auscultation. No rales, rhonchi, wheezing, or rubs  HEART: Regular rate and rhythm; No murmurs, rubs, or gallops  ABDOMEN: Soft, Nontender, Nondistended; Bowel sounds present  NERVOUS SYSTEM:  Alert & Oriented X3.    EXTREMITIES:  2+ Peripheral Pulses, No clubbing, cyanosis, or edema  SKIN: warm dry                          10.5   6.23  )-----------( 225      ( 10 Nov 2020 06:50 )             33.7     11-10    141  |  110<H>  |  15  ----------------------------<  97  3.9   |  25  |  1.48<H>    Ca    8.2<L>      10 Nov 2020 06:50  Phos  2.4     11-10  Mg     2.2     11-10    TPro  6.1  /  Alb  2.2<L>  /  TBili  0.2  /  DBili  x   /  AST  17  /  ALT  11  /  AlkPhos  119  11-10    LIVER FUNCTIONS - ( 10 Nov 2020 06:50 )  Alb: 2.2 g/dL / Pro: 6.1 g/dL / ALK PHOS: 119 U/L / ALT: 11 U/L DA / AST: 17 U/L / GGT: x                       CAPILLARY BLOOD GLUCOSE  CAPILLARY BLOOD GLUCOSE      POCT Blood Glucose.: 110 mg/dL (08 Nov 2020 21:27)    CAPILLARY BLOOD GLUCOSE          RADIOLOGY & ADDITIONAL TESTS:

## 2020-11-10 NOTE — PROGRESS NOTE ADULT - PROBLEM SELECTOR PLAN 8
Echo 2018 noted with normal EF, G1DD  Will hold furosemide for now given no s/s of fluid overload  echo done

## 2020-11-10 NOTE — PROGRESS NOTE ADULT - ASSESSMENT
Patient is a 85 year old male with PMHx of CAD on ticagrelor, CHFpEF, HTN, DM presenting with episodes of diarrhea reported to be dark in nature, admitted for possible GI bleed, also noted with troponin elevation.      Patient is a 85 year old male with PMHx of CAD on ticagrelor, CHFpEF, HTN, DM presenting with episodes of diarrhea reported to be dark in nature, admitted for possible GI bleed, .

## 2020-11-10 NOTE — PROGRESS NOTE ADULT - REASON FOR ADMISSION
EGD with biopsy
Diarrhea

## 2020-11-10 NOTE — PROGRESS NOTE ADULT - PROBLEM SELECTOR PLAN 3
Extensive cardiac history, last cath 2019?  With chest pain and troponin elevation, likely demand ischemia in the setting of abdominal pathology.  - Started on ranolazine which patient has been on in the past, however not currently taking.  - Hold Brilinta, continue beta blocker, re-start high intensity statin. Patient noted to not be on aspirin.  Cardiology, Dr. Ha consulted Extensive cardiac history, last cath 2019?  With chest pain and troponin elevation, likely demand ischemia in the setting of abdominal pathology.  - Started on ranolazine which patient has been on in the past, however not currently taking.  - Hold Brilinta, continue beta blocker, re-start high intensity statin. Patient noted to not be on aspirin.  - Cardiology, Dr. Ha consulted, agrees with current plan

## 2020-11-10 NOTE — PROGRESS NOTE ADULT - NEGATIVE ENMT SYMPTOMS
no gum bleeding/no nasal discharge/no post-nasal discharge/no nose bleeds/no dry mouth/no throat pain/no dysphagia/no tinnitus/no nasal congestion/no hearing difficulty/no ear pain/no vertigo/no sinus symptoms

## 2020-11-10 NOTE — PROGRESS NOTE ADULT - ATTENDING COMMENTS
HIDA scan performed today and was negative for cholecystitis  No need for abx from a surgical perspective  Okay for a regular diet and d/c home from a surgical perspective  Feel free to call us back with any questions or concerns    Talita Hogan MD  Attending Physician
No acute events overnight. Denies any abdominal pain and improvement in diarrhea.    abd soft, non-distended, non-tender to palpation    - would give a CLD  - f/u HIDA scan, if HIDA unremarkable then okay for regular diet    Talita Hogan MD  Attending Physician
CT w/ possible mild cholecystitis but pt asymptomatic. Still denies any abdominal pain.    abd soft, non-distended, non-tender to palpation    - CLD  - HIDA ordered, will follow for HIDA results    Talita Hogan MD  Attending Physician
Patient was seen and examined at bedside    used with RN   Dr Lei helped with traslation too  Patient s/p EGD, tolerated procedure well  Able to tolerate meals    Vitals stable     P/E:  NAD  Lungs CTABL  S1S2 WNL, no mrg  Abd soft non tender, BS present   BL LE no edema or calf tenderness     Labs noted, Hb stable     A/P   GIB due to gastric and esophageal ulcer   CAD  CKD  HTN  DM  Chronic HBV infection   Acute cholecystitis ruled out    Plan:  Cont IV PPI  Hb stable   Will follow up with GI if okay to start Brilinta   Rest of the management as above
85 year old male with PMHx of CAD on ticagrelor, CHFpEF, HTN, DM presented with episodes of diarrhea reported to be dark in nature, admitted for possible GI bleed, also noted with troponin elevation and found to have mild cholecystitis/ cholelithiasis on CT abdomen. Was started on iv Cipro and flagyl. Patient showed significant improvement next day, no n/v/d. Stool culture, O and P negative.  Surgery was consulted for acute cholecystitis, HIDA scan is negative and patient has negative liu. Cardio was consulted for trop elevation, likely type 2, ECHO normal EF. Dr. Edwards on board, plan for EGD today. on clear liquid diet, will start on regular diet after EGD.    Diarrhea (resolved)  Incidental finding of cholelithiasis   ? GI bleed  Type 2 trop elevation  CHFpEF  CAD      Plan:    GI, Dr. Edwards planning to do EGD today, f/u results  HIDA scan negative, okay to advance diet to regular per surgery rec.  on IV Protonix BID,  on iv cipro, Flagyl, can be d/lolly.  will Advance diet to regular post EGD.  Post EGD depending on finding decision for resuming on Brilinta.

## 2020-11-10 NOTE — PROGRESS NOTE ADULT - PROBLEM SELECTOR PROBLEM 8
Chronic diastolic CHF (congestive heart failure)

## 2020-11-10 NOTE — PROGRESS NOTE ADULT - PROBLEM SELECTOR PLAN 9
Continue Entecavir
Continue Entecavir, liver enzymes significant for Alk PHos elevation, AST/ALT wnl

## 2020-11-10 NOTE — PROGRESS NOTE ADULT - ASSESSMENT
1. Anemia  2. Melena  3. Stool positive for occult blood  4. S/p EGD  5. Gastric ulcer  6. Esophageal ulcer  7. Gall stone     Suggestions:  1. Monitor H/H  2. Transfuse PRBC as needed  3. Protonix daily  4. Diet as tolerated  5. Surgical follow up  6. Avoid NSAID  7. Follow up path  8. DVT prophylaxis

## 2020-11-10 NOTE — PROGRESS NOTE ADULT - PROBLEM SELECTOR PLAN 1
With persistent dark diarrhea, hemodynamically stable. Patient does not know if he has had a EGD/colonoscopy before.  - GI, Dr. Edwards consulted  - Started on IV protonix BID, NPO,  EGD as per GI  CT abd also revealed acute sameer, surgery consulted, HIDA performed, showing no evidence of acute cholecystitis With persistent dark diarrhea, hemodynamically stable. Patient does not know if he has had a EGD/colonoscopy before.  - GI, Dr. Edwards consulted  - Started on IV protonix BID  - CT abd also revealed acute sameer, surgery consulted, HIDA performed, showing no evidence of acute cholecystitis. EGD showing gastric, esophageal ulcers. GI suggesting protonix

## 2020-11-10 NOTE — PROGRESS NOTE ADULT - PROBLEM SELECTOR PROBLEM 6
Varinder Casper, 61 y.o.,  female    SUBJECTIVE  Ff-up hospitalization palpitations/sob    DOA-1/5-1/11/19  Communication with [facility] Your 1/14/19    Presented with palpitations and SOB. No discharge summary available for review. She has a history of Chronic Afib, MVstenosis s/p  valve replacement 2013, CHF. She is on chronic anticoagulation with warfarin, and rate controlled on BB. Trop in the ED mildly elevated, no significant EKG findings. Echo cardiogram showed:Estimated left ventricular ejection fraction is 56 - 60%. Visually measured ejection fraction. Left ventricular mild concentric hypertrophy. Normal left ventricular wall motion, no regional wall motion abnormality noted. 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 Prosthesis is normal with mean gradient 4 mmHg. Nuclear stress test reviewed:     · Baseline ECG: Atrial fibrillation, non-specific ST-T wave abnormalities. · Gated SPECT: Left ventricular function post-stress was normal. Calculated ejection fraction is 59%. The TID ratio is 0.94. · Negative stress electrocardiogram.  · Left ventricular perfusion is abnormal.  · Myocardial perfusion imaging defect 1: There is a defect that is small in size with a mild reduction in uptake present in the inferolateral location(s) that is reversible. There is normal wall motion in the defect area. Viability in the area is good. The defect appears to probably be ischemia. · Positive myocardial perfusion imaging. Myocardial perfusion imaging supports a low risk stress test.    Her INR was subtherapeutic then wnl prior to discharge    Asthma- reports to be coughing more than usual since admission. No wheezing, sob.      GERD- takes nexium otc prn for heartburn    HL-taking simvastatin    ROS:  See HPI, all others negative        Patient Active Problem List   Diagnosis Code    Rheumatic valvular disease I09.1    Chronic diastolic congestive heart failure ([facility] [address] I50.32
 Atrial fibrillation ([facility]) I48.91    Long term current use of anticoagulant therapy Z79.01    Benign hypertensive heart disease with heart failure ([facility]) I11.0    Hyperlipidemia E78.5    Advanced directives, counseling/discussion Z71.89    Mild intermittent asthma without complication H20.30    NSTEMI (non-ST elevated myocardial infarction) ([facility]) I21.4    Chest pain R07.9       Current Outpatient Medications   Medication Sig Dispense Refill    azithromycin (ZITHROMAX) 250 mg tablet Take two tablets today then one tablet daily 6 Tab 0    chlorpheniramine-acetaminophen (CORICIDIN HBP COLD AND FLU) 2-325 mg tab Take 325 mg by mouth two (2) times a day.  losartan (COZAAR) 50 mg tablet Take 1 Tab by mouth daily. 30 Tab 0    aspirin delayed-release 81 mg tablet Take 1 Tab by mouth daily. 30 Tab 0    docusate sodium (COLACE) 100 mg capsule Take 1 Cap by mouth two (2) times a day for 90 days. 60 Cap 0    polyethylene glycol (MIRALAX) 17 gram packet Take 1 Packet by mouth daily. 30 Packet 0    esomeprazole (NEXIUM) 20 mg capsule Take 20 mg by mouth daily as needed (heart burn).  fluticasone (FLONASE ALLERGY RELIEF) 50 mcg/actuation nasal spray 2 Sprays by Both Nostrils route daily as needed for Rhinitis.  metoprolol succinate (TOPROL-XL) 100 mg tablet Take 1 Tab by mouth daily. 30 Tab 6    furosemide (LASIX) 40 mg tablet TAKE 1 TABLET BY MOUTH EVERY DAY 90 Tab 0    albuterol (VENTOLIN HFA) 90 mcg/actuation inhaler Take 2 Puffs by inhalation daily. 1 Inhaler 0    KLOR-CON M20 20 mEq tablet TAKE 1 TAB BY MOUTH DAILY. 30 Tab 6    warfarin (COUMADIN) 5 mg tablet TAKE 5MG DAILY EXCEPT 2.5MG ON TUES, THUR, AND SAT. OR AS DIRECTED BY OUR OFFICE 30 Tab 5    sodium chloride (SALINE NASAL) 0.65 % nasal squeeze bottle 1 Spray as needed for Congestion.  simvastatin (ZOCOR) 20 mg tablet Take 1 Tab by mouth nightly. 30 Tab 6    melatonin tab tablet Take 10 mg by mouth nightly.       fluticasone
furoate (ARNUITY ELLIPTA) 100 mcg/actuation dsdv inhaler Take 2 Puffs by inhalation daily. 1 Inhaler 0    hydrocortisone (CORTIZONE) 0.5 % ointment Apply 1 Each to affected area two (2) times a day. use thin layer 29 g 0    OTHER Check PT, INR on 01/14/19, results to cardiologist immediately, Diagnosis- HTN 1 Each 0    OTHER Graded compression Stockings b/l LE- Use as directed 1 Each 0    OTHER Incentive Spirometry- use as directed 1 Each 0       Allergies   Allergen Reactions    Morphine Rash and Itching    Norco [Hydrocodone-Acetaminophen] Itching       Past Medical History:   Diagnosis Date    Aortic valve disorders 1/13/2011    Asthma     Atrial fibrillation ([facility] [address] 1/13/2011    Congestive heart failure, unspecified March 2005    Cleared quickly with Lasix therapy    Echocardiogram 01/10/2012    A-fib. EF 45%. Mild RVE. RVSP 45-50. Massive LAE. Mod TAMI. Mod MS. Severe MR (mean grad 10). Mild-mod AI. Mild TR. Pulmonic systolic flow reversal.  Mild IVCE.  Hypertension     Iron deficiency anemias     Long term (current) use of anticoagulants 2/23/2011    Murmur 1/2011    Grade I-II/VI systolic ejection murmur along left sternal border, with radiation to the pulmonic area.  S/P cardiac cath 02/24/2012    Minimal coronary artery disease. Mild LVE. EF 55%. Mod MR. Mod-severe MS. RA 15.  RV 55/16. PA 58/34. W 36.  CO/CI 3.5/1.9 (TD); 3.61/1.9 ([place]). R to L shunt suggested.     Wears glasses     Weight gain        Social History     Socioeconomic History    Marital status:      Spouse name: Not on file    Number of children: Not on file    Years of education: Not on file    Highest education level: Not on file   Social Needs    Financial resource strain: Not on file    Food insecurity - worry: Not on file    Food insecurity - inability: Not on file    Transportation needs - medical: Not on file   [employer] needs - non-medical: Not on file   Occupational History
 Not on file   Tobacco Use    Smoking status: Former Smoker    Smokeless tobacco: Never Used   Substance and Sexual Activity    Alcohol use:  Yes     Alcohol/week: 0.6 oz     Types: 1 Glasses of wine per week     Comment: occassionally    Drug use: No    Sexual activity: Yes     Partners: Male     Birth control/protection: None   Other Topics Concern    Not on file   Social History Narrative    Not on file       Family History   Problem Relation Age of Onset    Heart Surgery Father         Open-Heart Surgery    Other Father         Venous Thromboembolism    Heart Disease Mother         Enlarged Heart    Hypertension Mother     Diabetes Mother          OBJECTIVE    Physical Exam:     Visit Vitals  /78 (BP 1 Location: Left arm, BP Patient Position: Sitting)   Pulse 79   Temp 97.9 °F (36.6 °C) (Oral)   Resp 16   Ht 5' 7\" (1.702 m)   Wt 188 lb (85.3 kg)   SpO2 98%   BMI 29.44 kg/m²       General: alert, well-appearing,  in no apparent distress or pain  HEENT: throat and pharynx normal, eac patent TM intact  CVS: irregular, mech valve click, + murmur  Lungs:clear to ausculation bilaterally, no crackles, wheezing or rhonchi noted  Extremities: no edema, no cyanosis, MSK grossly normal  Psych:  mood and affect normal      ASSESSMENT/PLAN  Diagnoses and all orders for this visit:    NSTEMI  Asymptomatic  Mild reversible ischemia on stress test  On bb, arb, ASA  Advised switch statin to lipitor, pt wants to hold off and cont simvastatin for now  Keep appt with dr. Faraz Mariscal in  Feb    Atrial fibrillation, unspecified type ([address]  Rate controlled, On BB, coumadin  INR therapeutic  Following dr. Faraz Mariscal    Hyperlipidemia, unspecified hyperlipidemia type  Suboptimal, improved  med compliance  Offered to switch simvastatin to lipitor, pt wants to try lifestyle modifications  Recheck cmp/lipid panel in 3 months    Long term current use of anticoagulant therapy  Cards following    Rheumatic valvular disease
Moderate persistent asthma without complication  Cont prn albuterol  Will monitor  Given zpack  Flu vaccine utd  Offer PCV vaccine on next visit    Benign hypertensive heart disease with heart failure (HCC)  Controlled  Cont cozaar, metoprolol, hctz    Impaired fasting glucose  Tlcs, monitoring    BMI 29  Encourage wt loss    Follow-up Disposition:  Return if symptoms worsen or fail to improve, for keep appt in feb. labs prior/cough ff-up      Patient understands plan of care. Patient has provided input and agrees with goals.
Chronic kidney disease (CKD)

## 2020-11-10 NOTE — PROGRESS NOTE ADULT - PROBLEM SELECTOR PLAN 6
Appears to have baseline creatinine around 1.7, presented with 1.93  creatinine 1.38 (improving)  Avoid nephrotoxic medications Appears to have baseline creatinine around 1.7, presented with 1.93  creatinine 1.48  Avoid nephrotoxic medications

## 2020-11-10 NOTE — PROGRESS NOTE ADULT - NEGATIVE MUSCULOSKELETAL SYMPTOMS
no back pain/no leg pain R/no muscle cramps/no neck pain/no leg pain L/no stiffness/no arm pain L/no arm pain R/no muscle weakness

## 2020-11-10 NOTE — PROGRESS NOTE ADULT - NEGATIVE PSYCHIATRIC SYMPTOMS
no insomnia/no depression/no anxiety/no suicidal ideation/no agitation/no paranoia/no memory loss/no mood swings

## 2020-11-10 NOTE — PROGRESS NOTE ADULT - SUBJECTIVE AND OBJECTIVE BOX
[   ] ICU                                          [   ] CCU                                      [  X ] Medical Floor      Patient is comfortable. No new complaints reported, No abdominal pain, N/V, hematemesis, hematochezia, melena, fever, chills, chest pain, SOB, cough or diarrhea reported.    VITALS  Vital Signs Last 24 Hrs  T(C): 36.6 (10 Nov 2020 05:11), Max: 36.6 (10 Nov 2020 05:11)  T(F): 97.9 (10 Nov 2020 05:11), Max: 97.9 (10 Nov 2020 05:11)  HR: 60 (10 Nov 2020 05:11) (55 - 60)  BP: 147/40 (10 Nov 2020 05:11) (140/48 - 150/43)   RR: 17 (10 Nov 2020 05:11) (16 - 17)  SpO2: 96% (10 Nov 2020 05:11) (96% - 99%)       MEDICATIONS  (STANDING):  atorvastatin 40 milliGRAM(s) Oral at bedtime  entecavir 0.5 milliGRAM(s) Oral daily  ferrous    sulfate 325 milliGRAM(s) Oral daily  influenza   Vaccine 0.5 milliLiter(s) IntraMuscular once  melatonin 3 milliGRAM(s) Oral at bedtime  metoprolol tartrate 50 milliGRAM(s) Oral two times a day  pantoprazole  Injectable 40 milliGRAM(s) IV Push every 12 hours  potassium chloride  10 mEq/100 mL IVPB 10 milliEquivalent(s) IV Intermittent once  tamsulosin 0.4 milliGRAM(s) Oral at bedtime    MEDICATIONS  (PRN):  acetaminophen   Tablet .. 650 milliGRAM(s) Oral every 6 hours PRN Mild Pain (1 - 3)                            10.5   6.23  )-----------( 225      ( 10 Nov 2020 06:50 )             33.7       11-10    141  |  110<H>  |  15  ----------------------------<  97  3.9   |  25  |  1.48<H>    Ca    8.2<L>      10 Nov 2020 06:50  Phos  2.4     11-10  Mg     2.2     11-10    TPro  6.1  /  Alb  2.2<L>  /  TBili  0.2  /  DBili  x   /  AST  17  /  ALT  11  /  AlkPhos  119  11-10

## 2020-11-10 NOTE — PROGRESS NOTE ADULT - PROBLEM SELECTOR PLAN 2
Pw 3 day hx of diarrhea 5-6 episodes, is dark and watery. Noted to be green early on admission and then became block. Rule out infectious cause as diarrhea started after ingestion of raw seafood.  - Stool culture negative, C Diff negative, GI PCR negative

## 2020-11-10 NOTE — PROGRESS NOTE ADULT - NEGATIVE GENERAL GENITOURINARY SYMPTOMS
no hematuria/no renal colic/no bladder infections/no flank pain R/no incontinence/no dysuria/no flank pain L/no urine discoloration/no gas in urine

## 2020-11-10 NOTE — PROGRESS NOTE ADULT - NEGATIVE OPHTHALMOLOGIC SYMPTOMS
no photophobia/no blurred vision L/no irritation R/no discharge L/no discharge R/no pain L/no diplopia/no scleral injection R/no lacrimation L/no lacrimation R/no pain R/no irritation L/no scleral injection L/no blurred vision R

## 2020-11-10 NOTE — PROGRESS NOTE ADULT - RS GEN PE MLT RESP DETAILS PC
no wheezes/airway patent/good air movement/respirations non-labored/no rhonchi/breath sounds equal/no rales

## 2020-11-10 NOTE — PROGRESS NOTE ADULT - NEGATIVE NEUROLOGICAL SYMPTOMS
no difficulty walking/no loss of sensation/no tremors/no loss of consciousness/no syncope/no vertigo/no headache

## 2020-11-10 NOTE — PROGRESS NOTE ADULT - GASTROINTESTINAL DETAILS
bowel sounds normal/no rigidity/soft/nontender/no masses palpable/no guarding/no rebound tenderness/no distention

## 2020-11-11 ENCOUNTER — TRANSCRIPTION ENCOUNTER (OUTPATIENT)
Age: 85
End: 2020-11-11

## 2020-11-11 VITALS
OXYGEN SATURATION: 100 % | HEART RATE: 62 BPM | SYSTOLIC BLOOD PRESSURE: 146 MMHG | RESPIRATION RATE: 17 BRPM | TEMPERATURE: 98 F | DIASTOLIC BLOOD PRESSURE: 55 MMHG

## 2020-11-11 LAB
ALBUMIN SERPL ELPH-MCNC: 2.2 G/DL — LOW (ref 3.5–5)
ALP SERPL-CCNC: 123 U/L — HIGH (ref 40–120)
ALT FLD-CCNC: 10 U/L DA — SIGNIFICANT CHANGE UP (ref 10–60)
ANION GAP SERPL CALC-SCNC: 7 MMOL/L — SIGNIFICANT CHANGE UP (ref 5–17)
AST SERPL-CCNC: 21 U/L — SIGNIFICANT CHANGE UP (ref 10–40)
BILIRUB SERPL-MCNC: 0.3 MG/DL — SIGNIFICANT CHANGE UP (ref 0.2–1.2)
BUN SERPL-MCNC: 22 MG/DL — HIGH (ref 7–18)
CALCIUM SERPL-MCNC: 8.2 MG/DL — LOW (ref 8.4–10.5)
CHLORIDE SERPL-SCNC: 111 MMOL/L — HIGH (ref 96–108)
CO2 SERPL-SCNC: 23 MMOL/L — SIGNIFICANT CHANGE UP (ref 22–31)
CREAT SERPL-MCNC: 1.49 MG/DL — HIGH (ref 0.5–1.3)
GLUCOSE SERPL-MCNC: 129 MG/DL — HIGH (ref 70–99)
HCT VFR BLD CALC: 32.5 % — LOW (ref 39–50)
HGB BLD-MCNC: 10.1 G/DL — LOW (ref 13–17)
MAGNESIUM SERPL-MCNC: 2.1 MG/DL — SIGNIFICANT CHANGE UP (ref 1.6–2.6)
MCHC RBC-ENTMCNC: 31.1 GM/DL — LOW (ref 32–36)
MCHC RBC-ENTMCNC: 31.2 PG — SIGNIFICANT CHANGE UP (ref 27–34)
MCV RBC AUTO: 100.3 FL — HIGH (ref 80–100)
NRBC # BLD: 0 /100 WBCS — SIGNIFICANT CHANGE UP (ref 0–0)
PHOSPHATE SERPL-MCNC: 2.6 MG/DL — SIGNIFICANT CHANGE UP (ref 2.5–4.5)
PLATELET # BLD AUTO: 227 K/UL — SIGNIFICANT CHANGE UP (ref 150–400)
POTASSIUM SERPL-MCNC: 4.6 MMOL/L — SIGNIFICANT CHANGE UP (ref 3.5–5.3)
POTASSIUM SERPL-SCNC: 4.6 MMOL/L — SIGNIFICANT CHANGE UP (ref 3.5–5.3)
PROT SERPL-MCNC: 6.1 G/DL — SIGNIFICANT CHANGE UP (ref 6–8.3)
RBC # BLD: 3.24 M/UL — LOW (ref 4.2–5.8)
RBC # FLD: 13.9 % — SIGNIFICANT CHANGE UP (ref 10.3–14.5)
SODIUM SERPL-SCNC: 141 MMOL/L — SIGNIFICANT CHANGE UP (ref 135–145)
SURGICAL PATHOLOGY STUDY: SIGNIFICANT CHANGE UP
WBC # BLD: 6.36 K/UL — SIGNIFICANT CHANGE UP (ref 3.8–10.5)
WBC # FLD AUTO: 6.36 K/UL — SIGNIFICANT CHANGE UP (ref 3.8–10.5)

## 2020-11-11 PROCEDURE — 90686 IIV4 VACC NO PRSV 0.5 ML IM: CPT

## 2020-11-11 PROCEDURE — 93306 TTE W/DOPPLER COMPLETE: CPT

## 2020-11-11 PROCEDURE — 82962 GLUCOSE BLOOD TEST: CPT

## 2020-11-11 PROCEDURE — 87507 IADNA-DNA/RNA PROBE TQ 12-25: CPT

## 2020-11-11 PROCEDURE — 71045 X-RAY EXAM CHEST 1 VIEW: CPT

## 2020-11-11 PROCEDURE — 85730 THROMBOPLASTIN TIME PARTIAL: CPT

## 2020-11-11 PROCEDURE — 80053 COMPREHEN METABOLIC PANEL: CPT

## 2020-11-11 PROCEDURE — 80061 LIPID PANEL: CPT

## 2020-11-11 PROCEDURE — 36415 COLL VENOUS BLD VENIPUNCTURE: CPT

## 2020-11-11 PROCEDURE — 86901 BLOOD TYPING SEROLOGIC RH(D): CPT

## 2020-11-11 PROCEDURE — 87046 STOOL CULTR AEROBIC BACT EA: CPT

## 2020-11-11 PROCEDURE — 80076 HEPATIC FUNCTION PANEL: CPT

## 2020-11-11 PROCEDURE — 82306 VITAMIN D 25 HYDROXY: CPT

## 2020-11-11 PROCEDURE — 87177 OVA AND PARASITES SMEARS: CPT

## 2020-11-11 PROCEDURE — 80048 BASIC METABOLIC PNL TOTAL CA: CPT

## 2020-11-11 PROCEDURE — 87493 C DIFF AMPLIFIED PROBE: CPT

## 2020-11-11 PROCEDURE — 88305 TISSUE EXAM BY PATHOLOGIST: CPT

## 2020-11-11 PROCEDURE — 82247 BILIRUBIN TOTAL: CPT

## 2020-11-11 PROCEDURE — 88312 SPECIAL STAINS GROUP 1: CPT

## 2020-11-11 PROCEDURE — 87086 URINE CULTURE/COLONY COUNT: CPT

## 2020-11-11 PROCEDURE — 99285 EMERGENCY DEPT VISIT HI MDM: CPT

## 2020-11-11 PROCEDURE — 83880 ASSAY OF NATRIURETIC PEPTIDE: CPT

## 2020-11-11 PROCEDURE — 85025 COMPLETE CBC W/AUTO DIFF WBC: CPT

## 2020-11-11 PROCEDURE — 84100 ASSAY OF PHOSPHORUS: CPT

## 2020-11-11 PROCEDURE — 82746 ASSAY OF FOLIC ACID SERUM: CPT

## 2020-11-11 PROCEDURE — 71250 CT THORAX DX C-: CPT

## 2020-11-11 PROCEDURE — 83605 ASSAY OF LACTIC ACID: CPT

## 2020-11-11 PROCEDURE — 99239 HOSP IP/OBS DSCHRG MGMT >30: CPT

## 2020-11-11 PROCEDURE — 83036 HEMOGLOBIN GLYCOSYLATED A1C: CPT

## 2020-11-11 PROCEDURE — A9537: CPT

## 2020-11-11 PROCEDURE — 86769 SARS-COV-2 COVID-19 ANTIBODY: CPT

## 2020-11-11 PROCEDURE — 82248 BILIRUBIN DIRECT: CPT

## 2020-11-11 PROCEDURE — 83735 ASSAY OF MAGNESIUM: CPT

## 2020-11-11 PROCEDURE — 81001 URINALYSIS AUTO W/SCOPE: CPT

## 2020-11-11 PROCEDURE — 78226 HEPATOBILIARY SYSTEM IMAGING: CPT

## 2020-11-11 PROCEDURE — 83986 ASSAY PH BODY FLUID NOS: CPT

## 2020-11-11 PROCEDURE — 82272 OCCULT BLD FECES 1-3 TESTS: CPT

## 2020-11-11 PROCEDURE — 86900 BLOOD TYPING SEROLOGIC ABO: CPT

## 2020-11-11 PROCEDURE — 82550 ASSAY OF CK (CPK): CPT

## 2020-11-11 PROCEDURE — 82607 VITAMIN B-12: CPT

## 2020-11-11 PROCEDURE — 84484 ASSAY OF TROPONIN QUANT: CPT

## 2020-11-11 PROCEDURE — 85027 COMPLETE CBC AUTOMATED: CPT

## 2020-11-11 PROCEDURE — 93005 ELECTROCARDIOGRAM TRACING: CPT

## 2020-11-11 PROCEDURE — 83690 ASSAY OF LIPASE: CPT

## 2020-11-11 PROCEDURE — 86850 RBC ANTIBODY SCREEN: CPT

## 2020-11-11 PROCEDURE — 84443 ASSAY THYROID STIM HORMONE: CPT

## 2020-11-11 PROCEDURE — 82553 CREATINE MB FRACTION: CPT

## 2020-11-11 PROCEDURE — 87635 SARS-COV-2 COVID-19 AMP PRB: CPT

## 2020-11-11 PROCEDURE — 85610 PROTHROMBIN TIME: CPT

## 2020-11-11 PROCEDURE — 74176 CT ABD & PELVIS W/O CONTRAST: CPT

## 2020-11-11 PROCEDURE — 74018 RADEX ABDOMEN 1 VIEW: CPT

## 2020-11-11 PROCEDURE — 82728 ASSAY OF FERRITIN: CPT

## 2020-11-11 PROCEDURE — 87045 FECES CULTURE AEROBIC BACT: CPT

## 2020-11-11 PROCEDURE — 96374 THER/PROPH/DIAG INJ IV PUSH: CPT

## 2020-11-11 RX ORDER — FUROSEMIDE 40 MG
1 TABLET ORAL
Qty: 0 | Refills: 0 | DISCHARGE
Start: 2020-11-11

## 2020-11-11 RX ORDER — FUROSEMIDE 40 MG
20 TABLET ORAL DAILY
Refills: 0 | Status: DISCONTINUED | OUTPATIENT
Start: 2020-11-11 | End: 2020-11-11

## 2020-11-11 RX ORDER — PANTOPRAZOLE SODIUM 20 MG/1
1 TABLET, DELAYED RELEASE ORAL
Qty: 30 | Refills: 0
Start: 2020-11-11 | End: 2020-12-10

## 2020-11-11 RX ADMIN — ENTECAVIR 0.5 MILLIGRAM(S): 0.5 TABLET ORAL at 12:30

## 2020-11-11 RX ADMIN — Medication 325 MILLIGRAM(S): at 12:30

## 2020-11-11 RX ADMIN — Medication 50 MILLIGRAM(S): at 06:37

## 2020-11-11 RX ADMIN — INFLUENZA VIRUS VACCINE 0.5 MILLILITER(S): 15; 15; 15; 15 SUSPENSION INTRAMUSCULAR at 12:36

## 2020-11-11 RX ADMIN — PANTOPRAZOLE SODIUM 40 MILLIGRAM(S): 20 TABLET, DELAYED RELEASE ORAL at 09:05

## 2020-11-11 NOTE — PHYSICAL THERAPY INITIAL EVALUATION ADULT - CRITERIA FOR SKILLED THERAPEUTIC INTERVENTIONS
anticipated discharge recommendation/impairments found/risk reduction/prevention/rehab potential/therapy frequency/predicted duration of therapy intervention/functional limitations in following categories

## 2020-11-11 NOTE — DISCHARGE NOTE NURSING/CASE MANAGEMENT/SOCIAL WORK - PATIENT PORTAL LINK FT
You can access the FollowMyHealth Patient Portal offered by [facility] by registering at the following website: [email or web address]. By joining [facility]’s FollowMyHealth portal, you will also be able to view your health information using other applications (apps) compatible with our system.

## 2020-11-11 NOTE — DISCHARGE NOTE NURSING/CASE MANAGEMENT/SOCIAL WORK - NSSCTYPOFSERV_GEN_ALL_CORE
Interval History: Resolving SBO and tolerating oral intake    Review of Systems   Constitutional: Negative for appetite change.   HENT: Negative for congestion, ear pain, sneezing and sore throat.    Eyes: Negative for redness and visual disturbance.   Respiratory: Negative for cough, chest tightness and stridor.    Cardiovascular: Negative for chest pain.   Gastrointestinal: Positive for abdominal distention and abdominal pain. Negative for blood in stool, diarrhea, nausea and vomiting.        Having BMs and KUB shows dye in the rectum this am   Genitourinary: Negative for difficulty urinating, dysuria and hematuria.   Musculoskeletal: Negative for arthralgias, back pain, joint swelling, myalgias and neck pain.   Skin: Negative for rash.   Neurological: Positive for facial asymmetry and weakness. Negative for dizziness.        R hemiparesis   Psychiatric/Behavioral: Negative for agitation. The patient is not nervous/anxious.      Objective:     Vital Signs (Most Recent):  Temp: 96.9 °F (36.1 °C) (04/06/19 0729)  Pulse: 73 (04/06/19 0729)  Resp: 20 (04/06/19 0729)  BP: (!) 183/77 (04/06/19 0729)  SpO2: 99 % (04/06/19 0800) Vital Signs (24h Range):  Temp:  [96.9 °F (36.1 °C)-97.9 °F (36.6 °C)] 96.9 °F (36.1 °C)  Pulse:  [62-85] 73  Resp:  [18-20] 20  SpO2:  [96 %-99 %] 99 %  BP: (129-183)/(67-87) 183/77     Weight: 125.7 kg (277 lb 1.9 oz)  Body mass index is 44.73 kg/m².    Intake/Output Summary (Last 24 hours) at 4/6/2019 1141  Last data filed at 4/6/2019 0600  Gross per 24 hour   Intake 1760 ml   Output --   Net 1760 ml      Physical Exam   Constitutional: He is oriented to person, place, and time. He appears well-developed and well-nourished.   HENT:   Head: Normocephalic.   Eyes: Pupils are equal, round, and reactive to light.   Neck: Normal range of motion. Neck supple. No thyromegaly present.   Cardiovascular: Normal rate and regular rhythm. Exam reveals no friction rub.   No murmur heard.  Pulmonary/Chest: No  respiratory distress. He has no wheezes.   Abdominal: Bowel sounds are normal. He exhibits distension. There is tenderness. There is no rebound and no guarding.   Abd is less swollen & less distended with better BS+   Musculoskeletal: Normal range of motion. He exhibits no edema or tenderness.   Lymphadenopathy:     He has no cervical adenopathy.   Neurological: He is alert and oriented to person, place, and time. He has normal reflexes. A cranial nerve deficit is present. Coordination abnormal.   Skin: Skin is warm and dry.   Psychiatric: He has a normal mood and affect. Judgment and thought content normal.       Significant Labs:   CBC:   Recent Labs   Lab 04/05/19 0625 04/06/19  0652   WBC 8.26 6.78  6.78   HGB 13.6* 14.2  14.2   HCT 40.0 41.1  41.1    210  210     CMP:   Recent Labs   Lab 04/05/19 0625 04/06/19  0652    143  143   K 3.6 3.3*  3.3*    107  107   CO2 26 25  25   GLU 79 87  87   BUN 14 7*  7*   CREATININE 0.8 0.7  0.7   CALCIUM 8.5* 8.7  8.7   PROT 6.1 6.6  6.6   ALBUMIN 3.1* 3.4*  3.4*   BILITOT 0.8 0.6  0.6   ALKPHOS 60 63  63   AST 22 24  24   ALT 29 30  30   ANIONGAP 10 11  11   EGFRNONAA >60 >60  >60       Significant Imaging: I have reviewed all pertinent imaging results/findings within the past 24 hours.   HHA 5h/6days aweek

## 2020-11-12 RX ORDER — INSULIN GLARGINE 100 [IU]/ML
1 INJECTION, SOLUTION SUBCUTANEOUS
Qty: 0 | Refills: 0 | DISCHARGE

## 2020-11-12 RX ORDER — FUROSEMIDE 40 MG
1 TABLET ORAL
Qty: 0 | Refills: 0 | DISCHARGE

## 2020-11-16 ENCOUNTER — APPOINTMENT (OUTPATIENT)
Dept: CARDIOLOGY | Facility: CLINIC | Age: 85
End: 2020-11-16
Payer: MEDICARE

## 2020-11-16 VITALS
SYSTOLIC BLOOD PRESSURE: 119 MMHG | BODY MASS INDEX: 25 KG/M2 | HEART RATE: 70 BPM | OXYGEN SATURATION: 99 % | DIASTOLIC BLOOD PRESSURE: 57 MMHG | RESPIRATION RATE: 18 BRPM | WEIGHT: 128 LBS | TEMPERATURE: 97.5 F

## 2020-11-16 PROCEDURE — 99214 OFFICE O/P EST MOD 30 MIN: CPT

## 2020-11-16 PROCEDURE — 93000 ELECTROCARDIOGRAM COMPLETE: CPT

## 2020-11-16 RX ORDER — ATORVASTATIN CALCIUM 20 MG/1
20 TABLET, FILM COATED ORAL
Refills: 0 | Status: DISCONTINUED | COMMUNITY
End: 2020-11-16

## 2020-11-16 RX ORDER — SIMVASTATIN 10 MG/1
10 TABLET, FILM COATED ORAL
Qty: 30 | Refills: 0 | Status: ACTIVE | COMMUNITY
Start: 2020-05-25

## 2020-11-16 RX ORDER — LATANOPROST/PF 0.005 %
0.01 DROPS OPHTHALMIC (EYE)
Qty: 2 | Refills: 0 | Status: ACTIVE | COMMUNITY
Start: 2020-09-25

## 2020-11-16 RX ORDER — LISINOPRIL 2.5 MG/1
2.5 TABLET ORAL
Refills: 0 | Status: DISCONTINUED | COMMUNITY
End: 2020-11-16

## 2020-11-16 RX ORDER — ENTECAVIR 0.5 MG/1
0.5 TABLET, FILM COATED ORAL
Qty: 30 | Refills: 0 | Status: ACTIVE | COMMUNITY
Start: 2020-10-28

## 2020-11-16 RX ORDER — PEN NEEDLE, DIABETIC 31 G X1/4"
32G X 4 MM NEEDLE, DISPOSABLE MISCELLANEOUS
Qty: 100 | Refills: 0 | Status: ACTIVE | COMMUNITY
Start: 2020-07-18

## 2020-11-16 RX ORDER — TICAGRELOR 90 MG/1
90 TABLET ORAL
Qty: 60 | Refills: 0 | Status: DISCONTINUED | COMMUNITY
Start: 2019-06-20 | End: 2020-11-16

## 2020-11-16 RX ORDER — AMLODIPINE BESYLATE 5 MG/1
5 TABLET ORAL
Qty: 30 | Refills: 0 | Status: DISCONTINUED | COMMUNITY
Start: 2019-04-17 | End: 2020-11-16

## 2020-11-16 NOTE — HISTORY OF PRESENT ILLNESS
[FreeTextEntry1] :   \par 1. HTN: on medications, controlled. \par 2. Hyperlipidemia: on statin. No muscle pain is noted. \par 3. CAD s/p PCI: patient has had mutiple PCIs.\par He underwent cardiac cath at [facility] on 9/25/18 and he had ISR of prox and mid LAD stents. \par He underwent repeat cardiac cath [facility] 6/2019 and was found to have ISR to the LAD and had PCI. \par He was admitted to [facility] in 6/2019 due to hypotension. \par He was admitted to [facility] due to PNA in 7/2020. He denies CP, cough or fevers. He has mild exertional SOB.\par He was also recently admitted due to vomiting and diarrhea. His symptoms have improved. He denies CP. His ET is very limited due to leg and back pain.\par

## 2020-11-16 NOTE — REASON FOR VISIT
[Follow-Up - Clinic] : a clinic follow-up of [Coronary Artery Disease] : coronary artery disease [Hyperlipidemia] : hyperlipidemia [Hypertension] : hypertension [FreeTextEntry1] : 85 M HTN hyperlipidemia CKD Cr 1.9 CAD s/p PCI for f/u after hospitalization.\par \par

## 2020-11-16 NOTE — DISCUSSION/SUMMARY
[FreeTextEntry1] : 85 M HTN hyperlipidemia CKD Cr 1.9 CAD s/p PCI for f/u after hospitalization.\par Continue current medications for now.\par Patient to bring in medication list.\par Continue ASA and Brilinta.\par

## 2020-11-30 RX ORDER — ASPIRIN 81 MG
81 TABLET, DELAYED RELEASE (ENTERIC COATED) ORAL
Refills: 0 | Status: DISCONTINUED | COMMUNITY
End: 2020-11-30

## 2020-11-30 RX ORDER — METOPROLOL TARTRATE 25 MG/1
25 TABLET, FILM COATED ORAL
Refills: 0 | Status: DISCONTINUED | COMMUNITY
End: 2020-11-30

## 2020-11-30 RX ORDER — AMLODIPINE BESYLATE 2.5 MG/1
2.5 TABLET ORAL
Qty: 30 | Refills: 0 | Status: DISCONTINUED | COMMUNITY
Start: 2020-08-12 | End: 2020-11-30

## 2020-11-30 RX ORDER — METOPROLOL TARTRATE 50 MG/1
50 TABLET, FILM COATED ORAL
Qty: 60 | Refills: 0 | Status: DISCONTINUED | COMMUNITY
Start: 2019-05-13 | End: 2020-11-30

## 2020-11-30 RX ORDER — FUROSEMIDE 20 MG/1
20 TABLET ORAL
Qty: 30 | Refills: 0 | Status: DISCONTINUED | COMMUNITY
Start: 2020-08-12 | End: 2020-11-30

## 2020-12-14 ENCOUNTER — APPOINTMENT (OUTPATIENT)
Dept: CARDIOLOGY | Facility: CLINIC | Age: 85
End: 2020-12-14
Payer: MEDICARE

## 2020-12-14 VITALS
BODY MASS INDEX: 25.39 KG/M2 | SYSTOLIC BLOOD PRESSURE: 160 MMHG | WEIGHT: 130 LBS | TEMPERATURE: 97.3 F | DIASTOLIC BLOOD PRESSURE: 62 MMHG | OXYGEN SATURATION: 97 % | HEART RATE: 70 BPM | RESPIRATION RATE: 18 BRPM

## 2020-12-14 PROCEDURE — 93000 ELECTROCARDIOGRAM COMPLETE: CPT

## 2020-12-14 PROCEDURE — 99214 OFFICE O/P EST MOD 30 MIN: CPT

## 2020-12-14 RX ORDER — METOPROLOL SUCCINATE 100 MG/1
100 TABLET, EXTENDED RELEASE ORAL
Refills: 0 | Status: ACTIVE | COMMUNITY
Start: 2020-11-03

## 2020-12-14 RX ORDER — METOPROLOL SUCCINATE 100 MG/1
100 TABLET, EXTENDED RELEASE ORAL
Qty: 30 | Refills: 0 | Status: ACTIVE | COMMUNITY
Start: 2020-11-28

## 2020-12-14 NOTE — HISTORY OF PRESENT ILLNESS
[FreeTextEntry1] :  \par 1. HTN: on medications, compliant with medications.\par 2. Hyperlipidemia: on statin. No SE noted. \par 3. CAD s/p PCI: patient has had mutiple PCIs.\par He underwent cardiac cath at [facility] on 9/25/18 and he had ISR of prox and mid LAD stents. \par He underwent repeat cardiac cath [facility] 6/2019 and was found to have ISR to the LAD and had PCI. \par He was admitted to [facility] in 6/2019 due to hypotension. \par He was admitted to [facility] due to PNA in 7/2020. He denies CP, cough or fevers. He has mild exertional SOB.\par He was also recently admitted due to vomiting and diarrhea in 11/2020. Patient has mild exertional SOB. He is on Brilinta and lasix. His symptoms are improving. He denies cough or fevers.

## 2020-12-14 NOTE — DISCUSSION/SUMMARY
[FreeTextEntry1] : 85 M HTN hyperlipidemia CKD Cr 1.9 CAD s/p PCI for f/u after hospitalization.\par Echo from [facility] shows normal LVEF.\par DECREASE BRILINTA TO 60 BID.\par Continue medications for HTN and hyperlipidemia.\par

## 2021-02-25 ENCOUNTER — NON-APPOINTMENT (OUTPATIENT)
Age: 86
End: 2021-02-25

## 2021-02-25 ENCOUNTER — APPOINTMENT (OUTPATIENT)
Dept: CARDIOLOGY | Facility: CLINIC | Age: 86
End: 2021-02-25
Payer: MEDICARE

## 2021-02-25 VITALS
WEIGHT: 135 LBS | HEART RATE: 66 BPM | OXYGEN SATURATION: 98 % | BODY MASS INDEX: 26.37 KG/M2 | DIASTOLIC BLOOD PRESSURE: 84 MMHG | SYSTOLIC BLOOD PRESSURE: 167 MMHG | TEMPERATURE: 97.8 F | RESPIRATION RATE: 18 BRPM

## 2021-02-25 PROCEDURE — 93000 ELECTROCARDIOGRAM COMPLETE: CPT

## 2021-02-25 PROCEDURE — 99214 OFFICE O/P EST MOD 30 MIN: CPT

## 2021-02-25 NOTE — REASON FOR VISIT
[Follow-Up - Clinic] : a clinic follow-up of [Coronary Artery Disease] : coronary artery disease [Dyspnea] : dyspnea [Hyperlipidemia] : hyperlipidemia [Hypertension] : hypertension [FreeTextEntry1] : 85 M HTN hyperlipidemia CKD Cr 1.9 CAD s/p PCI for f/u.\par \par

## 2021-02-25 NOTE — DISCUSSION/SUMMARY
[FreeTextEntry1] : 85 M HTN hyperlipidemia CKD Cr 1.9 CAD s/p PCI for f/u SOB with edema.\par CHECK ECHO.\par START BUMEX 1MG DAILY. STOP LASIX.\par Continue medications for HTN.\par Continue statin.\par ASA and Brilinta.\par CHECK CHEM 7.

## 2021-02-25 NOTE — HISTORY OF PRESENT ILLNESS
[FreeTextEntry1] :  \par 1. HTN: on medications, no SE noted. \par 2. Hyperlipidemia: on statin.  \par 3. CAD s/p PCI: patient has had mutiple PCIs. He underwent cardiac cath at [facility] on 9/25/18 and he had ISR of prox and mid LAD stents. He underwent repeat cardiac cath [facility] 6/2019 and was found to have ISR to the LAD and had PCI. \par He was admitted to [facility] in 6/2019 due to hypotension. He was readmitted to [facility] due to PNA in 7/2020. \par He is on Brilinta 60 BID.\par He has noted SOB with leg edema. He reports taking lasix but no other symptoms. \par

## 2021-03-01 LAB
ALBUMIN SERPL ELPH-MCNC: 3.9 G/DL
ALP BLD-CCNC: 153 U/L
ALT SERPL-CCNC: 14 U/L
ANION GAP SERPL CALC-SCNC: 12 MMOL/L
AST SERPL-CCNC: 18 U/L
BILIRUB SERPL-MCNC: 0.2 MG/DL
BUN SERPL-MCNC: 44 MG/DL
CALCIUM SERPL-MCNC: 8.7 MG/DL
CHLORIDE SERPL-SCNC: 100 MMOL/L
CO2 SERPL-SCNC: 30 MMOL/L
CREAT SERPL-MCNC: 1.71 MG/DL
GLUCOSE SERPL-MCNC: 129 MG/DL
POTASSIUM SERPL-SCNC: 3.7 MMOL/L
PROT SERPL-MCNC: 7.3 G/DL
SODIUM SERPL-SCNC: 141 MMOL/L

## 2021-03-09 ENCOUNTER — APPOINTMENT (OUTPATIENT)
Dept: CARDIOLOGY | Facility: CLINIC | Age: 86
End: 2021-03-09
Payer: MEDICARE

## 2021-03-09 VITALS
SYSTOLIC BLOOD PRESSURE: 144 MMHG | BODY MASS INDEX: 27.54 KG/M2 | DIASTOLIC BLOOD PRESSURE: 53 MMHG | RESPIRATION RATE: 18 BRPM | WEIGHT: 141 LBS | OXYGEN SATURATION: 96 % | HEART RATE: 62 BPM | TEMPERATURE: 97.4 F

## 2021-03-09 PROCEDURE — 93306 TTE W/DOPPLER COMPLETE: CPT

## 2021-03-18 ENCOUNTER — APPOINTMENT (OUTPATIENT)
Dept: CARDIOLOGY | Facility: CLINIC | Age: 86
End: 2021-03-18
Payer: MEDICARE

## 2021-03-18 VITALS
WEIGHT: 138 LBS | TEMPERATURE: 97.5 F | HEART RATE: 68 BPM | DIASTOLIC BLOOD PRESSURE: 68 MMHG | OXYGEN SATURATION: 91 % | RESPIRATION RATE: 18 BRPM | SYSTOLIC BLOOD PRESSURE: 147 MMHG | BODY MASS INDEX: 26.95 KG/M2

## 2021-03-18 PROCEDURE — 93000 ELECTROCARDIOGRAM COMPLETE: CPT

## 2021-03-18 PROCEDURE — 99214 OFFICE O/P EST MOD 30 MIN: CPT

## 2021-03-18 NOTE — HISTORY OF PRESENT ILLNESS
[FreeTextEntry1] :  \par 1. HTN: on medications, BP controlled. \par 2. Hyperlipidemia: on statin. No muscle pain is noted. \par 3. CAD s/p PCI: patient has had mutiple PCIs. He underwent cardiac cath at [facility] on 9/25/18 and he had ISR of prox and mid LAD stents. He underwent repeat cardiac cath [facility] 6/2019 and was found to have ISR to the LAD and had PCI. \par He was admitted to [facility] in 6/2019 due to hypotension. He was readmitted to [facility] due to PNA in 7/2020. \par He is on Brilinta 60 BID.\par \par He had an echo at the last visit that showed moderate mitral regurgitation.\par He was started on Bumex but stopped and is on lasix 40 QD.\par His SOB is stable.

## 2021-03-18 NOTE — DISCUSSION/SUMMARY
[FreeTextEntry1] : 85 M HTN hyperlipidemia CKD Cr 1.9 CAD s/p PCI for f/u.\par Continue medications for HTN.\par Continue statin.\par Continue ASA and Brilinta. Brilinta should be 60 BID.\par Continue lasix.\par Pulm referral for SOB.

## 2021-03-18 NOTE — REASON FOR VISIT
[Follow-Up - Clinic] : a clinic follow-up of [Hyperlipidemia] : hyperlipidemia [Hypertension] : hypertension [FreeTextEntry1] : 85 M HTN hyperlipidemia CKD Cr 1.9 CAD s/p PCI for f/u.\par \par

## 2021-04-19 ENCOUNTER — APPOINTMENT (OUTPATIENT)
Dept: CARDIOLOGY | Facility: CLINIC | Age: 86
End: 2021-04-19
Payer: MEDICARE

## 2021-04-19 VITALS
HEART RATE: 60 BPM | BODY MASS INDEX: 27.34 KG/M2 | SYSTOLIC BLOOD PRESSURE: 136 MMHG | TEMPERATURE: 98.6 F | WEIGHT: 140 LBS | OXYGEN SATURATION: 92 % | RESPIRATION RATE: 18 BRPM | DIASTOLIC BLOOD PRESSURE: 73 MMHG

## 2021-04-19 PROCEDURE — 99214 OFFICE O/P EST MOD 30 MIN: CPT

## 2021-04-19 PROCEDURE — 93000 ELECTROCARDIOGRAM COMPLETE: CPT

## 2021-04-19 RX ORDER — ROSUVASTATIN CALCIUM 5 MG/1
5 TABLET, FILM COATED ORAL
Qty: 30 | Refills: 0 | Status: ACTIVE | COMMUNITY
Start: 2021-03-30

## 2021-04-19 RX ORDER — FUROSEMIDE 40 MG/1
40 TABLET ORAL
Refills: 0 | Status: DISCONTINUED | COMMUNITY
End: 2021-04-19

## 2021-04-19 NOTE — HISTORY OF PRESENT ILLNESS
[FreeTextEntry1] : \par  \par 1. HTN: on medications, compliant with medications.\par 2. Hyperlipidemia: on statin. No SE noted.\par 3. CAD s/p PCI: patient has had mutiple PCIs. He underwent cardiac cath at [facility] on 9/25/18 and he had ISR of prox and mid LAD stents. He underwent repeat cardiac cath [facility] 6/2019 and was found to have ISR to the LAD and had PCI. \par He was admitted to [facility] in 6/2019 due to hypotension. He was readmitted to [facility] due to PNA in 7/2020. \par He is on Brilinta 60 BID.\par \par He had an echo at the last visit that showed moderate mitral regurgitation.\par \par He still has intermittent SOB with exertion. The patient denies CP. \par His Bumex did not work so he is on lasix 40 QD and he still feels SOB but his urine output is good.

## 2021-04-19 NOTE — PHYSICAL EXAM
[General Appearance - Well Developed] : well developed [Normal Appearance] : normal appearance [Well Groomed] : well groomed [General Appearance - Well Nourished] : well nourished [No Deformities] : no deformities [General Appearance - In No Acute Distress] : no acute distress [Normal Conjunctiva] : the conjunctiva exhibited no abnormalities [Eyelids - No Xanthelasma] : the eyelids demonstrated no xanthelasmas [Normal Oral Mucosa] : normal oral mucosa [No Oral Pallor] : no oral pallor [No Oral Cyanosis] : no oral cyanosis [Normal Jugular Venous A Waves Present] : normal jugular venous A waves present [Normal Jugular Venous V Waves Present] : normal jugular venous V waves present [No Jugular Venous Zabala A Waves] : no jugular venous zabala A waves [Respiration, Rhythm And Depth] : normal respiratory rhythm and effort [Exaggerated Use Of Accessory Muscles For Inspiration] : no accessory muscle use [Auscultation Breath Sounds / Voice Sounds] : lungs were clear to auscultation bilaterally [Heart Rate And Rhythm] : heart rate and rhythm were normal [Heart Sounds] : normal S1 and S2 [Murmurs] : no murmurs present [Abdomen Soft] : soft [Abdomen Tenderness] : non-tender [Abdomen Mass (___ Cm)] : no abdominal mass palpated [Nail Clubbing] : no clubbing of the fingernails [Cyanosis, Localized] : no localized cyanosis [Petechial Hemorrhages (___cm)] : no petechial hemorrhages [Skin Color & Pigmentation] : normal skin color and pigmentation [] : no rash [No Venous Stasis] : no venous stasis [Skin Lesions] : no skin lesions [No Skin Ulcers] : no skin ulcer [No Xanthoma] : no  xanthoma was observed [Oriented To Time, Place, And Person] : oriented to person, place, and time [Affect] : the affect was normal [No Anxiety] : not feeling anxious [Mood] : the mood was normal

## 2021-04-19 NOTE — DISCUSSION/SUMMARY
[FreeTextEntry1] : 85 M HTN hyperlipidemia CAD s/p PCI CKD with SOB and CHF.\par INCREASE LASIX TO 40 QOD and continue 20 QD on other days.\par Continue medications for HTN.\par Continue ASA and Brilinta.

## 2021-04-19 NOTE — REASON FOR VISIT
[Follow-Up - Clinic] : a clinic follow-up of [Coronary Artery Disease] : coronary artery disease [Hyperlipidemia] : hyperlipidemia [Hypertension] : hypertension [FreeTextEntry1] : 85 M HTN hyperlipidemia CKD Cr 1.9 CAD s/p PCI for f/u.\par

## 2021-05-17 ENCOUNTER — APPOINTMENT (OUTPATIENT)
Dept: CARDIOLOGY | Facility: CLINIC | Age: 86
End: 2021-05-17
Payer: MEDICARE

## 2021-05-17 VITALS
HEART RATE: 61 BPM | TEMPERATURE: 97.3 F | SYSTOLIC BLOOD PRESSURE: 113 MMHG | DIASTOLIC BLOOD PRESSURE: 63 MMHG | RESPIRATION RATE: 18 BRPM | WEIGHT: 136 LBS | OXYGEN SATURATION: 92 % | BODY MASS INDEX: 26.56 KG/M2

## 2021-05-17 PROCEDURE — 99214 OFFICE O/P EST MOD 30 MIN: CPT

## 2021-05-17 PROCEDURE — 93000 ELECTROCARDIOGRAM COMPLETE: CPT

## 2021-05-17 RX ORDER — BUMETANIDE 1 MG/1
1 TABLET ORAL DAILY
Qty: 30 | Refills: 0 | Status: DISCONTINUED | COMMUNITY
Start: 2021-02-25 | End: 2021-05-17

## 2021-05-17 NOTE — REASON FOR VISIT
[Hyperlipidemia] : hyperlipidemia [Hypertension] : hypertension [FreeTextEntry1] : 85 M HTN hyperlipidemia CKD Cr 1.9 CAD s/p PCI for f/u.\par

## 2021-05-17 NOTE — HISTORY OF PRESENT ILLNESS
[FreeTextEntry1] :  \par 1. HTN: on medications, controlled.\par 2. Hyperlipidemia: on statin. No muscle pain is noted.\par 3. CAD s/p PCI: patient has had mutiple PCIs. He underwent cardiac cath at [facility] on 9/25/18 and he had ISR of prox and mid LAD stents. He underwent repeat cardiac cath [facility] 6/2019 and was found to have ISR to the LAD and had PCI. \par He was admitted to [facility] in 6/2019 due to hypotension. \par He was readmitted to [facility] due to PNA in 7/2020. \par He is on Brilinta 60 BID.\par \par He had an echo at the last visit that showed moderate mitral regurgitation.\par \par He is on lasix 40 QD with 40 at night on MWF. \par \par Patient denies CP. SOB has improved.\par Patient received both doses of his COVID vaccine.

## 2021-06-21 ENCOUNTER — APPOINTMENT (OUTPATIENT)
Dept: CARDIOLOGY | Facility: CLINIC | Age: 86
End: 2021-06-21
Payer: MEDICARE

## 2021-06-21 VITALS
RESPIRATION RATE: 18 BRPM | SYSTOLIC BLOOD PRESSURE: 135 MMHG | WEIGHT: 138 LBS | DIASTOLIC BLOOD PRESSURE: 66 MMHG | TEMPERATURE: 97.6 F | OXYGEN SATURATION: 97 % | BODY MASS INDEX: 26.95 KG/M2 | HEART RATE: 64 BPM

## 2021-06-21 PROCEDURE — 93000 ELECTROCARDIOGRAM COMPLETE: CPT

## 2021-06-21 PROCEDURE — 99214 OFFICE O/P EST MOD 30 MIN: CPT

## 2021-06-21 NOTE — HISTORY OF PRESENT ILLNESS
[FreeTextEntry1] :  \par 1. HTN: on medications, no SE noted. \par 2. Hyperlipidemia: on statin. \par 3. CAD s/p PCI: patient has had mutiple PCIs. He underwent cardiac cath at [facility] on 9/25/18 and he had ISR of prox and mid LAD stents. He underwent repeat cardiac cath [facility] 6/2019 and was found to have ISR to the LAD and had PCI. He is off ASA and is on Brilinta. He was scheduled for staged PCI but was unable to get it done due to multiple admissions and the COVID crisis. The plan is to manage medically.\par He was admitted to [facility] in 6/2019 due to hypotension. \par He was readmitted to [facility] due to PNA in 7/2020. \par He is on Brilinta 60 BID.\par 4. MR: He had an echo at the last visit that showed moderate mitral regurgitation.\par \par 5. SOB: He is on lasix 40 QD with 40 at night on MWF and his SOB and leg edema have improved.\par  \par Patient received both doses of his COVID vaccine. He had two episodes of CP that improved after NG.\par

## 2021-06-21 NOTE — REASON FOR VISIT
[Hyperlipidemia] : hyperlipidemia [Hypertension] : hypertension [Coronary Artery Disease] : coronary artery disease [FreeTextEntry1] : 85 M HTN hyperlipidemia CKD Cr 1.9 CAD s/p PCI for f/u.\par

## 2021-06-21 NOTE — DISCUSSION/SUMMARY
[FreeTextEntry1] : 85 M HTN hyperlipidemia CKD Cr 1.9 CAD s/p PCI for f/u.\par Cr stable at 1.9.\par Patient had two episodes of CP. Will monitor for now.\par Continue Brilinta for CAD.\par Continue medications for HTN.\par Continue lasix 40 QD and 40 PM 3 times/ week. \par Consider cardiac cath if symptoms persist.\par EKG FOR RBBB/ PVCs.\par . \par \par

## 2021-06-21 NOTE — PHYSICAL EXAM
[Well Developed] : well developed [Well Nourished] : well nourished [No Acute Distress] : no acute distress [Normal Conjunctiva] : normal conjunctiva [Normal Venous Pressure] : normal venous pressure [No Carotid Bruit] : no carotid bruit [Normal S1, S2] : normal S1, S2 [No Rub] : no rub [No Gallop] : no gallop [Clear Lung Fields] : clear lung fields [Good Air Entry] : good air entry [No Respiratory Distress] : no respiratory distress  [Soft] : abdomen soft [Non Tender] : non-tender [No Masses/organomegaly] : no masses/organomegaly [Normal Bowel Sounds] : normal bowel sounds [Normal Gait] : normal gait [No Edema] : no edema [No Cyanosis] : no cyanosis [No Clubbing] : no clubbing [No Varicosities] : no varicosities [No Rash] : no rash [No Skin Lesions] : no skin lesions [Moves all extremities] : moves all extremities [No Focal Deficits] : no focal deficits [Normal Speech] : normal speech [Alert and Oriented] : alert and oriented [Normal memory] : normal memory [de-identified] : 2/6 BELLA CHING

## 2021-08-05 ENCOUNTER — APPOINTMENT (OUTPATIENT)
Dept: CARDIOLOGY | Facility: CLINIC | Age: 86
End: 2021-08-05
Payer: MEDICARE

## 2021-08-05 VITALS
OXYGEN SATURATION: 95 % | BODY MASS INDEX: 26.56 KG/M2 | HEART RATE: 59 BPM | DIASTOLIC BLOOD PRESSURE: 64 MMHG | WEIGHT: 136 LBS | SYSTOLIC BLOOD PRESSURE: 108 MMHG | TEMPERATURE: 97.7 F | RESPIRATION RATE: 18 BRPM

## 2021-08-05 PROCEDURE — 99214 OFFICE O/P EST MOD 30 MIN: CPT

## 2021-08-05 PROCEDURE — 93000 ELECTROCARDIOGRAM COMPLETE: CPT

## 2021-08-05 NOTE — DISCUSSION/SUMMARY
[FreeTextEntry1] : 86 M HTN hyperlipidemia CKD Cr 1.9 CAD s/p PCI for f/u with CP and SOB.\par EKG for CP.\par DUE TO PERSISTENT SYMPTOMS CONSISTENT WITH PROGRESSIVE ANGINA, REFER FOR CARDIAC CATHETERIZATION.\par Continue Brilinta for CAD.\par Continue medications for HTN.\par Continue statin for hyperlipidemia.\par Continue lasix 40 QD and 40 PM 3 times/ week. \par

## 2021-08-05 NOTE — REASON FOR VISIT
[Hyperlipidemia] : hyperlipidemia [Hypertension] : hypertension [Coronary Artery Disease] : coronary artery disease [FreeTextEntry1] : 86 M HTN hyperlipidemia CKD Cr 1.9 CAD s/p PCI for f/u.\par

## 2021-08-05 NOTE — PHYSICAL EXAM
[Well Developed] : well developed [Well Nourished] : well nourished [No Acute Distress] : no acute distress [Normal Conjunctiva] : normal conjunctiva [Normal Venous Pressure] : normal venous pressure [No Carotid Bruit] : no carotid bruit [Normal S1, S2] : normal S1, S2 [No Rub] : no rub [No Gallop] : no gallop [Clear Lung Fields] : clear lung fields [Good Air Entry] : good air entry [No Respiratory Distress] : no respiratory distress  [Soft] : abdomen soft [Non Tender] : non-tender [No Masses/organomegaly] : no masses/organomegaly [Normal Bowel Sounds] : normal bowel sounds [Normal Gait] : normal gait [No Edema] : no edema [No Cyanosis] : no cyanosis [No Clubbing] : no clubbing [No Varicosities] : no varicosities [No Rash] : no rash [No Skin Lesions] : no skin lesions [Moves all extremities] : moves all extremities [No Focal Deficits] : no focal deficits [Normal Speech] : normal speech [Alert and Oriented] : alert and oriented [Normal memory] : normal memory [de-identified] : 2/6 BELLA CHING

## 2021-08-05 NOTE — HISTORY OF PRESENT ILLNESS
[FreeTextEntry1] :   \par 1. HTN: on medications, compliant with medications.\par 2. Hyperlipidemia: on statin. No muscle pain is noted.\par 3. CAD s/p PCI: patient has had mutiple PCIs. He underwent cardiac cath at [facility] on 9/25/18 and he had ISR of prox and mid LAD stents. He underwent repeat cardiac cath [facility] 6/2019 and was found to have ISR to the LAD and had PCI. He is off ASA and is on Brilinta. He was scheduled for staged PCI but was unable to get it done due to multiple admissions and the COVID crisis. \par He was admitted to [facility] in 6/2019 due to hypotension. \par He was readmitted to [facility] due to PNA in 7/2020. \par He is on Brilinta 60 BID.\par 4. MR: He had an echo at the last visit that showed moderate mitral regurgitation.\par \par 5. SOB: He is on lasix 40 QD with 40 at night on MWF and his SOB and leg edema have improved.\par  \par Patient received both doses of his COVID vaccine. \par \par The patient now returns  to clinic with worsening CP and SOB. Despite medical treatment, the patient\par continues to have exertional CP and SOB. His ET is severely limited due to his CP.\par He takes NG 2-3 times/ week.

## 2021-08-06 VITALS
DIASTOLIC BLOOD PRESSURE: 67 MMHG | WEIGHT: 136.91 LBS | SYSTOLIC BLOOD PRESSURE: 148 MMHG | RESPIRATION RATE: 17 BRPM | OXYGEN SATURATION: 94 % | TEMPERATURE: 97 F | HEART RATE: 65 BPM

## 2021-08-06 NOTE — H&P ADULT - NSHPSOCIALHISTORY_GEN_ALL_CORE
former 1 ppd smoker x 40 years (quit 15 years ago)  denies alcohol or illicit drug use Patient is a former smoker, quit 15 years ago. Patient denies any ETOH or illicit drug use.

## 2021-08-06 NOTE — H&P ADULT - GUM GEN PE MLT EXAM PC
SUBJECTIVE:    Chief Complaint   Patient presents with   â¢ Knee Injury     Right knee injury x 5 days. (whole right side hurting from fall)   â¢ Cough     Patient says she is coughing up yellow mucus and has waken up with crusty eyes. Patient is a 61year old female who presents with complains of upper respiratory symptoms that has been present for 2 weeks      Symptoms started with  nasal congestion, post nasal drip. Then progressed to post nasal drip, cough . Cough: cough  moderate, congested, deep     The above symptoms are associated with the following:      EYE Problems: none. She also complains of right knee pain. She fell 5 days ago and injured her knee. She says she is achy from the fall on the right but the knee is the main issue. Several global knee pain. Notes pain when weightbearing. She feels a little bit of instability of her joint feels like it came to give out on her and sometimes randomly she will get sharp jabs with certain twisting of her knee. ROS:    FEVER:  no fevers. APPETITE: No change in appetite. NECK Issues:  None  Lungs: No difficulty breathing, wheezing or hemoptysis. Heart:  No chest pain. Abdomen:  No abdomen pain, vomiting,  diarrhea or constipation. Neuro:  No headache    Urinary:  No urination issues. SKIN:  No rashes. Right knee: Without swelling. No redness. PMH:    Patient Active Problem List   Diagnosis   â¢ Allergic rhinitis, cause unspecified   â¢ Esophagitis, unspecified           ALLERGIES:    ALLERGIES:  Ferrous ascorbate and Erythromycin      MEDICATION:  No current outpatient medications on file. No current facility-administered medications for this visit.             Social History:  Social History     Tobacco Use   â¢ Smoking status: Never Smoker   â¢ Smokeless tobacco: Never Used   Substance Use Topics   â¢ Alcohol use: Not on file   â¢ Drug use: Not on file           OBJECTIVE:    Vitals:    06/18/19 1911   BP: 124/72   Pulse: 76 Resp: 14   Temp: 98.7 Â°F (37.1 Â°C)   TempSrc: Tympanic   SpO2: 96%   PainSc: 7-8   PainLoc: Knee          General appearance: Alert and in no apparent distress. Right Ear Exam: External auditory canal without erythema, swelling, or discharge., TM's intact without erythema, bulging, retraction, or fluid level. Left Ear Exam:  External auditory canal without erythema, swelling, or discharge., TM's intact without erythema, bulging, retraction, or fluid level. Eyes:   Clear without injection or discharge  Throat: pink and moist without edema, erythema or exudates or signs of abscess. Erythematous posterior wall with postnasal drip adherent without signs of abscess. Neck: supple without cervical lymphadenopathy. No meningismus. Lungs: clear to auscultation without wheezes, rhonchi, rales, retractions or stridors. Heart:  Regular rate and rhythm. Extremities: unremarkable  Skin: unremarkable    She has severe global peripatellar pain on palpation but nothing particular. No joint line tenderness. No pain with full extension or flexion. Some discomfort with meniscal stressing. Gait is slightly antalgic/hesitant      X-ray of the knee reveals no acute findings mild DJD  ASSESSMENT:    Sinusitis. Right knee contusion--I recommend patient follow-up with primary for recheck of the knee consider orthopedics. PLAN:      Rest  Fluids  Take your medication     Symptoms should improve gradually over the next 3-5 days. If symptoms  persist or  worsen then the patient will need to reassessed. If the patient cannot be seen by their PCP, then the patient is recommended to return to Immediate Care or to seek care in Emergency Room if Immediate Care is not available. No orders of the defined types were placed in this encounter. Medications prescribed. Discussed with patient the side effects of medication prescribed and precaution to take.     See AVS with details of treatment and plan.    Patient was given the AVS instructions to keep after it was was orally reviewed with patient regarding treatment and plan. Patient voiced understanding and was in an agreement with plan. Patient was encouraged to contact our office with any questions or issues regarding treatment and care. If symptoms  persist, worsen, or develops new symptoms that were not present at the time of visit then the patient will need to reassessed. If the patient cannot be seen by their PCP, then the patient is recommended to return to Immediate Care or to seek care in Emergency Room if Immediate Care is not available. This was discussed with the patient and voiced understanding.        Radha Connolly MD not examined

## 2021-08-06 NOTE — H&P ADULT - NSHPLABSRESULTS_GEN_ALL_CORE
14.3   9.53  )-----------( 226      ( 10 Aug 2021 10:04 )             42.3     08-10    137  |  101  |  65<H>  ----------------------------<  190<H>  4.3   |  22  |  2.29<H>    Ca    9.2      10 Aug 2021 10:04    TPro  8.5<H>  /  Alb  4.4  /  TBili  0.6  /  DBili  x   /  AST  25  /  ALT  34  /  AlkPhos  115  08-10

## 2021-08-06 NOTE — H&P ADULT - ASSESSMENT
85 y/o Bengali Speaking male w/ CONTRAST DYE AND CHLORHEXIDINE ALLERGIES and PMHx HTN, HLD, DM, CAD (s/p LUIS CARLOS p/mLAD w/ subsequent PTCA of ISR and residual pRCA 30-50% ISR), moderate MR, CKD (baseline Cr 1.9), anemia (baseline Hgb 10-11), chronic diastolic CHF (w/ EF 66%), hepatitis B, GERD, recent admission to [facility] 07/2020 for PNA and [facility] 11/2020 for gastric/esophageal ulcer and gastritis (ASA d/c at that time) who presents for cardiac catheterization w/ possible intervention if clinically indicated in light of patient's risk factors, CCS Class III anginal symptoms, and known prior need for laser/brachytherapy.     EKG on arrival _________. Labs on arrival significant for BUN/Cr 65/2.29, otherwise within normal limits. Patient reported he had not taken any home medications prior to arrival on 08/10/2021; however, patient did report he took Prednisone packet as it was prescribed to him and took his last dose of this on 08/10/2021 AM. Patient has also not been on Aspirin 81 mg daily since prior admission for gastric ulcer in 11/2020. As discussed w/ IC fellow, Dr. Yang Morrow, patient was ordered for ________. Patient also ordered for  cc bolus and  cc/hour x 4 hours, as well as Benadryl 50 mg IV once to be given on cath lab table.   					  ASA: III		Mallampati class: III	            Anginal Class: III    Sedation Plan: Moderate     Patient Is Suitable Candidate For Sedation: Yes     Risks & benefits of procedure and sedation and risks and benefits for the alternative therapy have been explained to the patient in layman’s terms including but not limited to: allergic reaction, bleeding, infection, arrhythmia, respiratory compromise, renal and vascular compromise, limb damage, MI, CVA, emergent CABG/Vascular Surgery and death. Informed consent obtained and in chart. 85 y/o Tajik Speaking male w/ CONTRAST DYE AND CHLORHEXIDINE ALLERGIES and PMHx HTN, HLD, DM, CAD (s/p LUIS CARLOS p/mLAD w/ subsequent PTCA of ISR and residual pRCA 30-50% ISR), moderate MR, CKD (baseline Cr 1.9), anemia (baseline Hgb 10-11), chronic diastolic CHF (w/ EF 66%), hepatitis B, GERD, recent admission to [facility] 07/2020 for PNA and [facility] 11/2020 for gastric/esophageal ulcer and gastritis (ASA d/c at that time) who presents for cardiac catheterization w/ possible intervention if clinically indicated in light of patient's risk factors, CCS Class III anginal symptoms, and known prior need for laser/brachytherapy.     EKG on arrival showed SR at 64 bpm w/ left axis deviation and RBBB. Labs on arrival significant for BUN/Cr 65/2.29, otherwise within normal limits. Patient reported he had not taken any home medications prior to arrival on 08/10/2021; however, patient did report he took Prednisone packet as it was prescribed to him and took his last dose of this on 08/10/2021 AM. Patient has also not been on Aspirin 81 mg daily since prior admission for gastric ulcer in 11/2020. As discussed w/ IC fellow, Dr. Yang Morrow, patient was ordered for Aspirin 325 mg PO once and Brilinta 180 mg PO once. Patient also ordered for  cc bolus and  cc/hour x 4 hours, as well as Benadryl 50 mg IV once to be given on cath lab table.   					  ASA: III		Mallampati class: III	            Anginal Class: III    Sedation Plan: Moderate     Patient Is Suitable Candidate For Sedation: Yes     Risks & benefits of procedure and sedation and risks and benefits for the alternative therapy have been explained to the patient in layman’s terms including but not limited to: allergic reaction, bleeding, infection, arrhythmia, respiratory compromise, renal and vascular compromise, limb damage, MI, CVA, emergent CABG/Vascular Surgery and death. Informed consent obtained and in chart.

## 2021-08-06 NOTE — H&P ADULT - HISTORY OF PRESENT ILLNESS
Cardiologist: Dr. Avendano/Dr. Castillo (PCP)  Escort: Daughter  Pharmacy: [facility]  COVID: Vax x2 (will bring in card)    *Pre medication sent to pharmacy - please verify w/ Med List  *Hx obtained via patient's daughter Oi (semi-reliable)    85yo Pashto Speaking M, CONTRAST DYE AND CHLORHEXIDINE ALLERGIES, with a PMH of HTN, HLD, DM, CAD s/p LUIS CARLOS p/mLAD w/ subsequent PTCA of ISR and residual pRCA 30-50% ISR, moderate MR, CKD (baseline cr 1.9), anemia (baseline Hb 10-11) chronic diastolic CHF (EF 66%), hepatitis B, GERD, recent admission to [facility] 7/2020 for PNA and [facility] 11/2020 for gastric/esophageal ulcer and gastritis (ASA d/c at that time) who presented to Dr. Avendano's office with complaints of worsening exertional CP and SOB. Per patient's daughter, patient has been complaining of CP similar to that which he experienced prior to previous stents worsening over the past month, relieved with rest. Endorses chronic LE edema. Denies dizziness, diaphoresis, palpitations, orthopnea/PND, abdominal pain, N/V/D, urinary sx, BRBPR, hematuria, melena, recent travel/sick contacts/illness. Of note: patient was planned to return for laser and brachytherapy of LAD after 6/2019 cath however due to frequent admissions and pandemic, it did not occur. In light of patient's risk factors, CCS angina class III sx and known need for laser/brachytherapy of LAD, patient is referred for cardiac catheterization with planned intervention of LAD.    Cardiac cath on 6/11/19: s/p PTCA proxLAD ISR, residual proxRCA 30-50% ISR, right radial access. Patient to return for laser and brachytherapy of LAD in 5 weeks. Cardiologist: Dr. Avendano/Dr. Castillo (PCP)  Escort: Daughter  Pharmacy: [facility], [phone]  COVID: Vaccinated (will bring in card)    *Pre medication sent to pharmacy  *History obtained via patient's daughter Arlette (semi-reliable)    85 y/o Georgian Speaking male w/ CONTRAST DYE AND CHLORHEXIDINE ALLERGIES and PMHx HTN, HLD, DM, CAD (s/p LUIS CARLOS p/mLAD w/ subsequent PTCA of ISR and residual pRCA 30-50% ISR), moderate MR, CKD (baseline Cr 1.9), anemia (baseline Hgb 10-11), chronic diastolic CHF (w/ EF 66%), hepatitis B, GERD, recent admission to [facility] 07/2020 for PNA and [facility] 11/2020 for gastric/esophageal ulcer and gastritis (ASA d/c at that time) who presented to Dr. Avendano's office c/o worsening exertional chest pain and SOB. Per patient's daughter, patient has been complaining of chest pain similar to that which he experienced prior to previous stents worsening over the past month, relieved w/ rest. Patient also endorses chronic LE edema. Patient denies dizziness, diaphoresis, palpitations, orthopnea/PND, abdominal pain, nausea/vomiting/diarrhea, urinary symptoms, BRBPR, hematuria, melena, recent travel/sick contacts/illness. Of note, patient was planned to return for laser and brachytherapy of LAD after 06/2019 cath; however, due to frequent admissions and the pandemic this did not occur.     In light of patient's risk factors, CCS angina class III symptoms, and known need for laser/brachytherapy of LAD, patient is referred for cardiac catheterization w/ possible intervention if clinically indicated.     Cardiac cath on 6/11/19: s/p PTCA proxLAD ISR, residual proxRCA 30-50% ISR, right radial access. Patient to return for laser and brachytherapy of LAD in 5 weeks.

## 2021-08-06 NOTE — H&P ADULT - ATTENDING COMMENTS
Please see PA note for full details.  I have reviewed the case, examined the patient and agree with plan.  HTN hyperlipidemia CKD with CAD s/p PCI of the LAD.  Continue ASA and plavix.  Will schedule patient for brachytherapy in the future.

## 2021-08-10 ENCOUNTER — TRANSCRIPTION ENCOUNTER (OUTPATIENT)
Age: 86
End: 2021-08-10

## 2021-08-10 ENCOUNTER — INPATIENT (INPATIENT)
Facility: HOSPITAL | Age: 86
LOS: 0 days | Discharge: HOME | DRG: 251 | End: 2021-08-11
Attending: INTERNAL MEDICINE | Admitting: INTERNAL MEDICINE
Payer: COMMERCIAL

## 2021-08-10 LAB
A1C WITH ESTIMATED AVERAGE GLUCOSE RESULT: 7.5 % — HIGH (ref 4–5.6)
ALBUMIN SERPL ELPH-MCNC: 4.4 G/DL — SIGNIFICANT CHANGE UP (ref 3.3–5)
ALP SERPL-CCNC: 115 U/L — SIGNIFICANT CHANGE UP (ref 40–120)
ALT FLD-CCNC: 34 U/L — SIGNIFICANT CHANGE UP (ref 10–45)
ANION GAP SERPL CALC-SCNC: 14 MMOL/L — SIGNIFICANT CHANGE UP (ref 5–17)
APTT BLD: 29.4 SEC — SIGNIFICANT CHANGE UP (ref 27.5–35.5)
AST SERPL-CCNC: 25 U/L — SIGNIFICANT CHANGE UP (ref 10–40)
BASOPHILS # BLD AUTO: 0.01 K/UL — SIGNIFICANT CHANGE UP (ref 0–0.2)
BASOPHILS NFR BLD AUTO: 0.1 % — SIGNIFICANT CHANGE UP (ref 0–2)
BILIRUB SERPL-MCNC: 0.6 MG/DL — SIGNIFICANT CHANGE UP (ref 0.2–1.2)
BUN SERPL-MCNC: 65 MG/DL — HIGH (ref 7–23)
CALCIUM SERPL-MCNC: 9.2 MG/DL — SIGNIFICANT CHANGE UP (ref 8.4–10.5)
CHLORIDE SERPL-SCNC: 101 MMOL/L — SIGNIFICANT CHANGE UP (ref 96–108)
CHOLEST SERPL-MCNC: 129 MG/DL — SIGNIFICANT CHANGE UP
CK MB CFR SERPL CALC: 7.6 NG/ML — HIGH (ref 0–6.7)
CK SERPL-CCNC: 130 U/L — SIGNIFICANT CHANGE UP (ref 30–200)
CO2 SERPL-SCNC: 22 MMOL/L — SIGNIFICANT CHANGE UP (ref 22–31)
CREAT SERPL-MCNC: 2.29 MG/DL — HIGH (ref 0.5–1.3)
EOSINOPHIL # BLD AUTO: 0 K/UL — SIGNIFICANT CHANGE UP (ref 0–0.5)
EOSINOPHIL NFR BLD AUTO: 0 % — SIGNIFICANT CHANGE UP (ref 0–6)
ESTIMATED AVERAGE GLUCOSE: 169 MG/DL — HIGH (ref 68–114)
GLUCOSE BLDC GLUCOMTR-MCNC: 146 MG/DL — HIGH (ref 70–99)
GLUCOSE BLDC GLUCOMTR-MCNC: 171 MG/DL — HIGH (ref 70–99)
GLUCOSE BLDC GLUCOMTR-MCNC: 184 MG/DL — HIGH (ref 70–99)
GLUCOSE SERPL-MCNC: 190 MG/DL — HIGH (ref 70–99)
HCT VFR BLD CALC: 42.3 % — SIGNIFICANT CHANGE UP (ref 39–50)
HDLC SERPL-MCNC: 53 MG/DL — SIGNIFICANT CHANGE UP
HGB BLD-MCNC: 14.3 G/DL — SIGNIFICANT CHANGE UP (ref 13–17)
IMM GRANULOCYTES NFR BLD AUTO: 0.4 % — SIGNIFICANT CHANGE UP (ref 0–1.5)
INR BLD: 1.04 — SIGNIFICANT CHANGE UP (ref 0.88–1.16)
LIPID PNL WITH DIRECT LDL SERPL: 55 MG/DL — SIGNIFICANT CHANGE UP
LYMPHOCYTES # BLD AUTO: 0.73 K/UL — LOW (ref 1–3.3)
LYMPHOCYTES # BLD AUTO: 7.7 % — LOW (ref 13–44)
MCHC RBC-ENTMCNC: 32.3 PG — SIGNIFICANT CHANGE UP (ref 27–34)
MCHC RBC-ENTMCNC: 33.8 GM/DL — SIGNIFICANT CHANGE UP (ref 32–36)
MCV RBC AUTO: 95.5 FL — SIGNIFICANT CHANGE UP (ref 80–100)
MONOCYTES # BLD AUTO: 0.06 K/UL — SIGNIFICANT CHANGE UP (ref 0–0.9)
MONOCYTES NFR BLD AUTO: 0.6 % — LOW (ref 2–14)
NEUTROPHILS # BLD AUTO: 8.69 K/UL — HIGH (ref 1.8–7.4)
NEUTROPHILS NFR BLD AUTO: 91.2 % — HIGH (ref 43–77)
NON HDL CHOLESTEROL: 76 MG/DL — SIGNIFICANT CHANGE UP
NRBC # BLD: 0 /100 WBCS — SIGNIFICANT CHANGE UP (ref 0–0)
PLATELET # BLD AUTO: 226 K/UL — SIGNIFICANT CHANGE UP (ref 150–400)
POTASSIUM SERPL-MCNC: 4.3 MMOL/L — SIGNIFICANT CHANGE UP (ref 3.5–5.3)
POTASSIUM SERPL-SCNC: 4.3 MMOL/L — SIGNIFICANT CHANGE UP (ref 3.5–5.3)
PROT SERPL-MCNC: 8.5 G/DL — HIGH (ref 6–8.3)
PROTHROM AB SERPL-ACNC: 12.5 SEC — SIGNIFICANT CHANGE UP (ref 10.6–13.6)
RBC # BLD: 4.43 M/UL — SIGNIFICANT CHANGE UP (ref 4.2–5.8)
RBC # FLD: 13 % — SIGNIFICANT CHANGE UP (ref 10.3–14.5)
SODIUM SERPL-SCNC: 137 MMOL/L — SIGNIFICANT CHANGE UP (ref 135–145)
TRIGL SERPL-MCNC: 106 MG/DL — SIGNIFICANT CHANGE UP
WBC # BLD: 9.53 K/UL — SIGNIFICANT CHANGE UP (ref 3.8–10.5)
WBC # FLD AUTO: 9.53 K/UL — SIGNIFICANT CHANGE UP (ref 3.8–10.5)

## 2021-08-10 PROCEDURE — 93454 CORONARY ARTERY ANGIO S&I: CPT | Mod: 26,XU

## 2021-08-10 PROCEDURE — 92920 PRQ TRLUML C ANGIOP 1ART&/BR: CPT | Mod: LD

## 2021-08-10 PROCEDURE — 99152 MOD SED SAME PHYS/QHP 5/>YRS: CPT

## 2021-08-10 PROCEDURE — 99222 1ST HOSP IP/OBS MODERATE 55: CPT

## 2021-08-10 PROCEDURE — 93010 ELECTROCARDIOGRAM REPORT: CPT

## 2021-08-10 RX ORDER — DEXTROSE 50 % IN WATER 50 %
12.5 SYRINGE (ML) INTRAVENOUS ONCE
Refills: 0 | Status: DISCONTINUED | OUTPATIENT
Start: 2021-08-10 | End: 2021-08-11

## 2021-08-10 RX ORDER — FERROUS SULFATE 325(65) MG
1 TABLET ORAL
Qty: 0 | Refills: 0 | DISCHARGE

## 2021-08-10 RX ORDER — METOPROLOL TARTRATE 50 MG
100 TABLET ORAL DAILY
Refills: 0 | Status: DISCONTINUED | OUTPATIENT
Start: 2021-08-10 | End: 2021-08-11

## 2021-08-10 RX ORDER — DIPHENHYDRAMINE HCL 50 MG
50 CAPSULE ORAL ONCE
Refills: 0 | Status: DISCONTINUED | OUTPATIENT
Start: 2021-08-10 | End: 2021-08-10

## 2021-08-10 RX ORDER — RANOLAZINE 500 MG/1
1 TABLET, FILM COATED, EXTENDED RELEASE ORAL
Qty: 0 | Refills: 0 | DISCHARGE

## 2021-08-10 RX ORDER — LISINOPRIL 2.5 MG/1
1 TABLET ORAL
Qty: 0 | Refills: 0 | DISCHARGE

## 2021-08-10 RX ORDER — NITROGLYCERIN 6.5 MG
0.4 CAPSULE, EXTENDED RELEASE ORAL ONCE
Refills: 0 | Status: COMPLETED | OUTPATIENT
Start: 2021-08-10 | End: 2021-08-10

## 2021-08-10 RX ORDER — TAMSULOSIN HYDROCHLORIDE 0.4 MG/1
1 CAPSULE ORAL
Qty: 0 | Refills: 0 | DISCHARGE

## 2021-08-10 RX ORDER — FOLIC ACID 0.8 MG
1 TABLET ORAL
Qty: 0 | Refills: 0 | DISCHARGE

## 2021-08-10 RX ORDER — ENTECAVIR 0.5 MG/1
1 TABLET ORAL
Qty: 0 | Refills: 0 | DISCHARGE

## 2021-08-10 RX ORDER — METOPROLOL TARTRATE 50 MG
1 TABLET ORAL
Qty: 0 | Refills: 0 | DISCHARGE

## 2021-08-10 RX ORDER — SODIUM CHLORIDE 9 MG/ML
1000 INJECTION, SOLUTION INTRAVENOUS
Refills: 0 | Status: DISCONTINUED | OUTPATIENT
Start: 2021-08-10 | End: 2021-08-11

## 2021-08-10 RX ORDER — SODIUM CHLORIDE 9 MG/ML
250 INJECTION INTRAMUSCULAR; INTRAVENOUS; SUBCUTANEOUS ONCE
Refills: 0 | Status: COMPLETED | OUTPATIENT
Start: 2021-08-10 | End: 2021-08-10

## 2021-08-10 RX ORDER — ATORVASTATIN CALCIUM 80 MG/1
20 TABLET, FILM COATED ORAL AT BEDTIME
Refills: 0 | Status: DISCONTINUED | OUTPATIENT
Start: 2021-08-10 | End: 2021-08-11

## 2021-08-10 RX ORDER — DEXTROSE 50 % IN WATER 50 %
25 SYRINGE (ML) INTRAVENOUS ONCE
Refills: 0 | Status: DISCONTINUED | OUTPATIENT
Start: 2021-08-10 | End: 2021-08-11

## 2021-08-10 RX ORDER — DEXTROSE 50 % IN WATER 50 %
15 SYRINGE (ML) INTRAVENOUS ONCE
Refills: 0 | Status: DISCONTINUED | OUTPATIENT
Start: 2021-08-10 | End: 2021-08-11

## 2021-08-10 RX ORDER — PANTOPRAZOLE SODIUM 20 MG/1
40 TABLET, DELAYED RELEASE ORAL ONCE
Refills: 0 | Status: DISCONTINUED | OUTPATIENT
Start: 2021-08-10 | End: 2021-08-10

## 2021-08-10 RX ORDER — SODIUM CHLORIDE 9 MG/ML
500 INJECTION INTRAMUSCULAR; INTRAVENOUS; SUBCUTANEOUS
Refills: 0 | Status: DISCONTINUED | OUTPATIENT
Start: 2021-08-10 | End: 2021-08-11

## 2021-08-10 RX ORDER — NITROGLYCERIN 6.5 MG
0.4 CAPSULE, EXTENDED RELEASE ORAL
Refills: 0 | Status: DISCONTINUED | OUTPATIENT
Start: 2021-08-10 | End: 2021-08-11

## 2021-08-10 RX ORDER — TICAGRELOR 90 MG/1
1 TABLET ORAL
Qty: 0 | Refills: 0 | DISCHARGE

## 2021-08-10 RX ORDER — ASPIRIN/CALCIUM CARB/MAGNESIUM 324 MG
325 TABLET ORAL ONCE
Refills: 0 | Status: COMPLETED | OUTPATIENT
Start: 2021-08-10 | End: 2021-08-10

## 2021-08-10 RX ORDER — ISOSORBIDE MONONITRATE 60 MG/1
30 TABLET, EXTENDED RELEASE ORAL DAILY
Refills: 0 | Status: DISCONTINUED | OUTPATIENT
Start: 2021-08-10 | End: 2021-08-11

## 2021-08-10 RX ORDER — MORPHINE SULFATE 50 MG/1
1 CAPSULE, EXTENDED RELEASE ORAL ONCE
Refills: 0 | Status: DISCONTINUED | OUTPATIENT
Start: 2021-08-10 | End: 2021-08-10

## 2021-08-10 RX ORDER — TAMSULOSIN HYDROCHLORIDE 0.4 MG/1
0.4 CAPSULE ORAL DAILY
Refills: 0 | Status: DISCONTINUED | OUTPATIENT
Start: 2021-08-10 | End: 2021-08-11

## 2021-08-10 RX ORDER — TICAGRELOR 90 MG/1
90 TABLET ORAL
Refills: 0 | Status: DISCONTINUED | OUTPATIENT
Start: 2021-08-10 | End: 2021-08-11

## 2021-08-10 RX ORDER — INSULIN LISPRO 100/ML
VIAL (ML) SUBCUTANEOUS ONCE
Refills: 0 | Status: COMPLETED | OUTPATIENT
Start: 2021-08-10 | End: 2021-08-10

## 2021-08-10 RX ORDER — AMLODIPINE BESYLATE 2.5 MG/1
1 TABLET ORAL
Qty: 0 | Refills: 0 | DISCHARGE

## 2021-08-10 RX ORDER — PANTOPRAZOLE SODIUM 20 MG/1
40 TABLET, DELAYED RELEASE ORAL
Refills: 0 | Status: DISCONTINUED | OUTPATIENT
Start: 2021-08-10 | End: 2021-08-11

## 2021-08-10 RX ORDER — TENOFOVIR DISOPROXIL FUMARATE 300 MG/1
25 TABLET, FILM COATED ORAL DAILY
Refills: 0 | Status: DISCONTINUED | OUTPATIENT
Start: 2021-08-10 | End: 2021-08-11

## 2021-08-10 RX ORDER — GLUCAGON INJECTION, SOLUTION 0.5 MG/.1ML
1 INJECTION, SOLUTION SUBCUTANEOUS ONCE
Refills: 0 | Status: DISCONTINUED | OUTPATIENT
Start: 2021-08-10 | End: 2021-08-11

## 2021-08-10 RX ORDER — FUROSEMIDE 40 MG
40 TABLET ORAL EVERY 12 HOURS
Refills: 0 | Status: DISCONTINUED | OUTPATIENT
Start: 2021-08-11 | End: 2021-08-11

## 2021-08-10 RX ORDER — ROSUVASTATIN CALCIUM 5 MG/1
1 TABLET ORAL
Qty: 0 | Refills: 0 | DISCHARGE

## 2021-08-10 RX ORDER — LINAGLIPTIN 5 MG/1
0 TABLET, FILM COATED ORAL
Qty: 0 | Refills: 0 | DISCHARGE

## 2021-08-10 RX ORDER — TICAGRELOR 90 MG/1
180 TABLET ORAL ONCE
Refills: 0 | Status: COMPLETED | OUTPATIENT
Start: 2021-08-10 | End: 2021-08-10

## 2021-08-10 RX ORDER — INSULIN GLARGINE 100 [IU]/ML
20 INJECTION, SOLUTION SUBCUTANEOUS AT BEDTIME
Refills: 0 | Status: DISCONTINUED | OUTPATIENT
Start: 2021-08-10 | End: 2021-08-11

## 2021-08-10 RX ORDER — SODIUM CHLORIDE 9 MG/ML
500 INJECTION INTRAMUSCULAR; INTRAVENOUS; SUBCUTANEOUS
Refills: 0 | Status: DISCONTINUED | OUTPATIENT
Start: 2021-08-10 | End: 2021-08-10

## 2021-08-10 RX ORDER — POTASSIUM CHLORIDE 20 MEQ
1 PACKET (EA) ORAL
Qty: 0 | Refills: 0 | DISCHARGE

## 2021-08-10 RX ORDER — FUROSEMIDE 40 MG
40 TABLET ORAL
Refills: 0 | Status: DISCONTINUED | OUTPATIENT
Start: 2021-08-10 | End: 2021-08-10

## 2021-08-10 RX ORDER — PANTOPRAZOLE SODIUM 20 MG/1
1 TABLET, DELAYED RELEASE ORAL
Qty: 0 | Refills: 0 | DISCHARGE

## 2021-08-10 RX ORDER — INSULIN GLARGINE 100 [IU]/ML
20 INJECTION, SOLUTION SUBCUTANEOUS ONCE
Refills: 0 | Status: DISCONTINUED | OUTPATIENT
Start: 2021-08-10 | End: 2021-08-10

## 2021-08-10 RX ORDER — FUROSEMIDE 40 MG
1 TABLET ORAL
Qty: 0 | Refills: 0 | DISCHARGE

## 2021-08-10 RX ADMIN — TICAGRELOR 90 MILLIGRAM(S): 90 TABLET ORAL at 18:30

## 2021-08-10 RX ADMIN — SODIUM CHLORIDE 500 MILLILITER(S): 9 INJECTION INTRAMUSCULAR; INTRAVENOUS; SUBCUTANEOUS at 10:48

## 2021-08-10 RX ADMIN — Medication 0.4 MILLIGRAM(S): at 19:28

## 2021-08-10 RX ADMIN — Medication 2: at 18:31

## 2021-08-10 RX ADMIN — SODIUM CHLORIDE 75 MILLILITER(S): 9 INJECTION INTRAMUSCULAR; INTRAVENOUS; SUBCUTANEOUS at 15:12

## 2021-08-10 RX ADMIN — TICAGRELOR 180 MILLIGRAM(S): 90 TABLET ORAL at 11:54

## 2021-08-10 RX ADMIN — MORPHINE SULFATE 1 MILLIGRAM(S): 50 CAPSULE, EXTENDED RELEASE ORAL at 16:00

## 2021-08-10 RX ADMIN — INSULIN GLARGINE 20 UNIT(S): 100 INJECTION, SOLUTION SUBCUTANEOUS at 21:50

## 2021-08-10 RX ADMIN — Medication 30 MILLILITER(S): at 19:29

## 2021-08-10 RX ADMIN — ISOSORBIDE MONONITRATE 30 MILLIGRAM(S): 60 TABLET, EXTENDED RELEASE ORAL at 21:50

## 2021-08-10 RX ADMIN — Medication 0.4 MILLIGRAM(S): at 21:51

## 2021-08-10 RX ADMIN — SODIUM CHLORIDE 100 MILLILITER(S): 9 INJECTION INTRAMUSCULAR; INTRAVENOUS; SUBCUTANEOUS at 11:54

## 2021-08-10 RX ADMIN — TAMSULOSIN HYDROCHLORIDE 0.4 MILLIGRAM(S): 0.4 CAPSULE ORAL at 18:31

## 2021-08-10 RX ADMIN — Medication 325 MILLIGRAM(S): at 11:54

## 2021-08-10 RX ADMIN — TENOFOVIR DISOPROXIL FUMARATE 25 MILLIGRAM(S): 300 TABLET, FILM COATED ORAL at 21:50

## 2021-08-10 RX ADMIN — ATORVASTATIN CALCIUM 20 MILLIGRAM(S): 80 TABLET, FILM COATED ORAL at 21:50

## 2021-08-10 RX ADMIN — MORPHINE SULFATE 1 MILLIGRAM(S): 50 CAPSULE, EXTENDED RELEASE ORAL at 15:09

## 2021-08-10 NOTE — DISCHARGE NOTE PROVIDER - NSDCFUADDAPPT_GEN_ALL_CORE_FT
-It is recommended for you to go to cardiac rehabilitation, which is outpatient physical therapy tailored to improve the heart. You were provided a prescription and should call your insurance company to find facilities that is covered by your insurance. We have provided you with a prescription for cardiac rehab which is medically supervised exercise program for your heart. It has been shown to improve the quantity and quality of life of people with heart disease like yours. You should attend cardiac rehab 3 times per week for 12 weeks. We have provided you with a list of nearby facilities. Please call your insurance carrier to determine which of these facilities are covered under your plan.   ---Please bring this prescription with you to your follow up appointment with your cardiologist who can then further assist you to enroll into a cardiac rehab program.

## 2021-08-10 NOTE — DISCHARGE NOTE PROVIDER - CARE PROVIDER_API CALL
Jay Avendano)  Cardiovascular Disease; Internal Medicine  [address] [place] [address]  Phone: [phone]  Fax: [phone]  Established Patient  Follow Up Time:

## 2021-08-10 NOTE — DISCHARGE NOTE PROVIDER - NSDCCPCAREPLAN_GEN_ALL_CORE_FT
PRINCIPAL DISCHARGE DIAGNOSIS  Diagnosis: CAD (coronary artery disease)  Assessment and Plan of Treatment: You have a diagnosis of coronary artery disease and underwent a cardiac catheterization. The blockage in the left anterior descending coronary artery was opened.  You have been started on Brilinta(Ticagrelor) 90mg Twice Daily. The procedure was done through the wrist. Please avoid any heavy lifting  (no more than 3 to 5 lbs) or strenuous activity for five days. If you develop any swelling, bleeding, hardening of the skin (hematoma formation), acute pain, numbness/tingling  in your arm please contact your doctor immediately or call our 24/7 line: [phone].   NEVER MISS A DOSE OF BRILINTA; IF YOU DO, YOU ARE AT RISK OF YOUR STENT CLOSING AND HAVING A HEART ATTACK. DO NOT STOP THESE TWO MEDICATIONS UNLESS INSTRUCTED TO DO SO BY YOUR CARDIOLOGIST.    Please make a follow up appointment with your cardiologist Dr Avendano within 1-2 weeks of your discharge. All of your prescriptions have been sent electronically to your pharmacy.  We have provided you with a prescription for cardiac rehab which is medically supervised exercise program for your heart and has been shown to improve the quantity and quality of life of people with heart disease like yours. You should attend cardiac rehab 3 times per week for 12 weeks. We have provided you with a list of nearby facilities. Please call your insurance carrier to determine which of these facilities are covered under your plan. Please bring this prescription with you to your follow up appointment with your cardiologist who can then further assist you to enroll into a cardiac rehab program.         PRINCIPAL DISCHARGE DIAGNOSIS  Diagnosis: CAD (coronary artery disease)  Assessment and Plan of Treatment: You have a diagnosis of coronary artery disease and underwent a cardiac catheterization. You had 2 blockages in the left anterior descending coronary artery that was opened with balloon angioplasty.  You have been started on Aspirin 81mg once a day and Brilinta (Ticagrelor) 90mg Twice Daily. NEVER MISS A DOSE OF THESE 2 MEDICATIONS; IF YOU DO, YOU ARE AT RISK OF YOUR STENT CLOSING AND HAVING A HEART ATTACK. DO NOT STOP THESE TWO MEDICATIONS UNLESS INSTRUCTED TO DO SO BY YOUR CARDIOLOGIST.   Please make a follow up appointment with your cardiologist Dr Avendano within 1-2 weeks of your discharge. All of your prescriptions have been sent electronically to your pharmacy.      SECONDARY DISCHARGE DIAGNOSES  Diagnosis: History of gastritis  Assessment and Plan of Treatment: Because you will be on blood thinning medications, you were started on medication Protonix to help protect the stomach against gastritis and ulcers. Please take this once a day.

## 2021-08-10 NOTE — DISCHARGE NOTE PROVIDER - NSDCMRMEDTOKEN_GEN_ALL_CORE_FT
Basaglar KwikPen 100 units/mL subcutaneous solution: 20 unit(s) subcutaneous once a day (at bedtime)  Brilinta (ticagrelor) 60 mg oral tablet: 1 tab(s) orally 2 times a day  furosemide 40 mg oral tablet: 1 tab(s) orally Tuesday, Thursday, Saturday, Sunday  furosemide 40 mg oral tablet: 1 tab(s) orally 2 times a day, Monday/Wednesday/Friday  Nitrostat 0.4 mg sublingual tablet: 1 tab(s) sublingual every 5 minutes, As Needed  rosuvastatin 5 mg oral tablet: 1 tab(s) orally once a day  tamsulosin 0.4 mg oral capsule: 1 cap(s) orally once a day  Toprol- mg oral tablet, extended release: 1 tab(s) orally once a day  Vascepa 1 g oral capsule: 2 cap(s) orally once a day  Vemlidy 25 mg oral tablet: 1 tab(s) orally once a day   aspirin 81 mg oral tablet, chewable: 1 tab(s) orally once a day  Basaglar KwikPen 100 units/mL subcutaneous solution: 20 unit(s) subcutaneous once a day (at bedtime)  Brilinta (ticagrelor) 60 mg oral tablet: 1 tab(s) orally 2 times a day  furosemide 40 mg oral tablet: 1 tab(s) orally Tuesday, Thursday, Saturday, Sunday  furosemide 40 mg oral tablet: 1 tab(s) orally 2 times a day, Monday/Wednesday/Friday  Nitrostat 0.4 mg sublingual tablet: 1 tab(s) sublingual every 5 minutes, As Needed  pantoprazole 40 mg oral delayed release tablet: 1 tab(s) orally once a day (before a meal)  rosuvastatin 5 mg oral tablet: 1 tab(s) orally once a day  tamsulosin 0.4 mg oral capsule: 1 cap(s) orally once a day  Toprol- mg oral tablet, extended release: 1 tab(s) orally once a day  Vascepa 1 g oral capsule: 2 cap(s) orally once a day  Vemlidy 25 mg oral tablet: 1 tab(s) orally once a day   aspirin 81 mg oral tablet, chewable: 1 tab(s) orally once a day  Basaglar KwikPen 100 units/mL subcutaneous solution: 20 unit(s) subcutaneous once a day (at bedtime)  Brilinta (ticagrelor) 60 mg oral tablet: 1 tab(s) orally 2 times a day  Cardiac rehabilitation. 3 times a week for 12 weeks. Dx: CAD s/p PCI:   furosemide 40 mg oral tablet: 1 tab(s) orally Tuesday, Thursday, Saturday, Sunday  furosemide 40 mg oral tablet: 1 tab(s) orally 2 times a day, Monday/Wednesday/Friday  Nitrostat 0.4 mg sublingual tablet: 1 tab(s) sublingual every 5 minutes, As Needed  pantoprazole 40 mg oral delayed release tablet: 1 tab(s) orally once a day (before a meal)  rosuvastatin 5 mg oral tablet: 1 tab(s) orally once a day  tamsulosin 0.4 mg oral capsule: 1 cap(s) orally once a day  Toprol- mg oral tablet, extended release: 1 tab(s) orally once a day  Vascepa 1 g oral capsule: 2 cap(s) orally once a day  Vemlidy 25 mg oral tablet: 1 tab(s) orally once a day

## 2021-08-10 NOTE — DISCHARGE NOTE PROVIDER - NSDCFUADDINST_GEN_ALL_CORE_FT
- Limit your physical activity for 24-48 hours. Do NOT engage in sports, heavy work or heavy lifting more than 5 lbs for 5 days.   - You MAY shower and wash the area gently with soap and water. BUT no TUB BATHS, HOT TUBS OR SWIMMING FOR 5 DAYS.  - Your procedure was done through your right wrist. If you observe flank bleeding from the puncture site, it is an emergency. Please put direct pressure on the site and go directly to the ER. Bleeding under the skin may also occur and a small "black and blue" may be expected. If the area appears to be expanding or swelling around the puncture site, apply manual compression and go immediately to the nearest ER. If your arm/hand becomes cool or blue and/or you are unable to move it, this must be treated as an emergency, go directly to the nearest ER. Look for signs of infection in the wrist: fever, red streaking of the arm, obvious pus formation and pain.  -If you have any issues or concerns regarding your access site, you may call [facility] at [phone].

## 2021-08-10 NOTE — DISCHARGE NOTE PROVIDER - HOSPITAL COURSE
87 y/o Pashto Speaking male w/ CONTRAST DYE AND CHLORHEXIDINE ALLERGIES and PMHx HTN, HLD, DM, CAD (s/p LUIS CARLOS p/mLAD w/ subsequent PTCA of ISR and residual pRCA 30-50% ISR), moderate MR, CKD (baseline Cr 1.9), anemia (baseline Hgb 10-11), chronic diastolic CHF (w/ EF 66%), hepatitis B, GERD, recent admission to [facility] 07/2020 for PNA and [facility] 11/2020 for gastric/esophageal ulcer and gastritis (ASA d/c at that time) who presented to Dr. Avendano's office c/o worsening exertional chest pain and SOB. Per patient's daughter, patient has been complaining of chest pain similar to that which he experienced prior to previous stents worsening over the past month, relieved w/ rest. Patient also endorses chronic LE edema. Patient denies dizziness, diaphoresis, palpitations, orthopnea/PND, abdominal pain, nausea/vomiting/diarrhea, urinary symptoms, BRBPR, hematuria, melena, recent travel/sick contacts/illness. Of note, patient was planned to return for laser and brachytherapy of LAD after 06/2019 cath; however, due to frequent admissions and the pandemic this did not occur.  In light of patient's risk factors, CCS angina class III symptoms, and known need for laser/brachytherapy of LAD, patient is referred for cardiac catheterization w/ possible intervention if clinically indicated. s/p cardiac cath 8/10/21 70% pLAD and 90% mLAD: Intervention: shockwave/PTCA pLAD and mLAD, R radial TR x2 @ 3:30 PM (small hematoma distal to access site). . EF 66%.  Total Contrast 35 mLs. Cr 2.29 today (baseline 1.9): NS @ 75 cc/hour x 4 hours per Charity. (Received 250 bolus followed by 100 cc/hour pre procedure). History of dCHF (EF 66%), moderate MR. History of gastric/esophageal ulcer and gastritis (ASA d/c at that time 7/2020): Discussed with Dr. Morrow--Protonix, Brilinta 90 mg BID. NO ASA as discussed with Charity. Pt given Morphine 1mg IV x 1 post cath for persistent unchanged CP (began during cath). EKG unchanged. Pain now improved. On arrival to [facility] pt c/o epigastric pain after eating, s/p maalox and SLNTG x 1 with improvement for 1 hr with return of pain.  s/p imdur 30 mg x1 and SLNTG x1.  EKG again unchanged.  Pt admitted to cardiac tele overnight for observation. VSS, labs checked, no events on tele. Right radial site stable without hematoma, distal pulse intact.  Pt will continue crestor 5 mg QD and vascepa 2 grams QD. Pt given appropriate d/c instructions and verbalized understanding. Medications sent to preferred pharmacy. Pt deemed stable for d/c per Dr. Avendano and will f/u with Dr. Avendano in 1-2 weeks.    Cardiac Rehab (STEMI/NSTEMI/ACS/Unstable Angina/CHF/Chronic Stable Angina/Heart Surgery (CABG,Valve)/Post PCI):              *Education on benefits of Cardiac Rehab provided to patient: Yes         *Referral and Prescription Given for Cardiac Rehab : Yes         *Pt given list of locations & instructed to contact their insurance company to review list of participating providers          *Pt instructed to bring Cardiac Rehab prescription with them to Cardiology Follow up appointment for assistance with enrollment: Yes     85 y/o Telugu Speaking male w/ CONTRAST DYE AND CHLORHEXIDINE ALLERGIES and PMHx HTN, HLD, DM, CAD (s/p LUIS CARLOS p/mLAD w/ subsequent PTCA of ISR and residual pRCA 30-50% ISR), moderate MR, CKD (baseline Cr 1.9), anemia (baseline Hgb 10-11), chronic diastolic CHF (w/ EF 66%), Hepatitis B, GERD, recent admission to [facility] 07/2020 for PNA and [facility] 11/2020 for gastric/esophageal ulcer and gastritis (ASA d/c'ed at that time) who presented to Dr. Avendano's office c/o worsening exertional chest pain and SOB. Per patient's daughter, patient has been complaining of chest pain similar to that which he experienced prior to previous stents worsening over the past month, relieved w/ rest. Patient also endorses chronic LE edema.  Of note, patient was planned to return for laser and brachytherapy of LAD after 06/2019 cath; however, due to freque nt admissions and the pandemic this did not occur.  In light of patient's risk factors, CCS angina class III symptoms, and known need for laser/brachytherapy of LAD, patient is referred for cardiac catheterization. Now s/p cardiac catheterization 8/10/21: 70% pLAD and 90% mLAD: Intervention: shockwave/PTCA pLAD and mLAD, R radial site access (had small hematoma distal to access site now stable). EF 66%. Total Contrast 35 mLs. Cr 2.29-2.3 (baseline 1.9): Received NS @ 75 cc/hour x 4 hours. (Received 250 bolus followed by 100 cc/hour pre procedure). History of dCHF (EF 66%), moderate MR. History of gastric/esophageal ulcer and gastritis (ASA d/c'ed at that time 7/2020): Initial plan per Dr. Morrow NO ASA; and continue Protonix, Brilinta 90 mg BID. But per Dr Avendano and discussion with pt, he was started on ASA 81mg qd. Pt given Morphine 1mg IV x 1 post cath for persistent unchanged CP (began during cath). EKG unchanged. Pain now improved. On arrival to [facility] pt c/o epigastric pain after eating, s/p maalox and SLNTG x 1 with improvement for 1 hr with return of pain.  S/p Imdur 30 mg x1 and SL NTG x1.  EKG again unchanged.  Pt admitted to cardiac tele overnight for observation. VSS, labs checked, no events on tele. Right radial site stable without hematoma, distal pulse intact.  Pt will continue crestor 5 mg QD and vascepa 2 grams QD. Pt given appropriate d/c instructions and verbalized understanding. Medications sent to preferred pharmacy. Pt deemed stable for d/c per Dr. Avendano and will f/u with Dr. Avendano in 1 week.    Cardiac Rehab (STEMI/NSTEMI/ACS/Unstable Angina/CHF/Chronic Stable Angina/Heart Surgery (CABG,Valve)/Post PCI):            *Education on benefits of Cardiac Rehab provided to patient: Yes         *Referral and Prescription Given for Cardiac Rehab : Yes         *Pt given list of locations & instructed to contact their insurance company to review list of participating providers          *Pt instructed to bring Cardiac Rehab prescription with them to Cardiology Follow up appointment for assistance with enrollment: Yes

## 2021-08-11 ENCOUNTER — TRANSCRIPTION ENCOUNTER (OUTPATIENT)
Age: 86
End: 2021-08-11

## 2021-08-11 VITALS — TEMPERATURE: 97 F

## 2021-08-11 LAB
ALBUMIN SERPL ELPH-MCNC: 3.7 G/DL — SIGNIFICANT CHANGE UP (ref 3.3–5)
ALP SERPL-CCNC: 101 U/L — SIGNIFICANT CHANGE UP (ref 40–120)
ALT FLD-CCNC: 24 U/L — SIGNIFICANT CHANGE UP (ref 10–45)
ANION GAP SERPL CALC-SCNC: 12 MMOL/L — SIGNIFICANT CHANGE UP (ref 5–17)
ANISOCYTOSIS BLD QL: SLIGHT — SIGNIFICANT CHANGE UP
AST SERPL-CCNC: 24 U/L — SIGNIFICANT CHANGE UP (ref 10–40)
BASOPHILS # BLD AUTO: 0 K/UL — SIGNIFICANT CHANGE UP (ref 0–0.2)
BASOPHILS NFR BLD AUTO: 0 % — SIGNIFICANT CHANGE UP (ref 0–2)
BILIRUB SERPL-MCNC: 0.3 MG/DL — SIGNIFICANT CHANGE UP (ref 0.2–1.2)
BUN SERPL-MCNC: 69 MG/DL — HIGH (ref 7–23)
BURR CELLS BLD QL SMEAR: PRESENT — SIGNIFICANT CHANGE UP
CALCIUM SERPL-MCNC: 8.9 MG/DL — SIGNIFICANT CHANGE UP (ref 8.4–10.5)
CHLORIDE SERPL-SCNC: 104 MMOL/L — SIGNIFICANT CHANGE UP (ref 96–108)
CO2 SERPL-SCNC: 22 MMOL/L — SIGNIFICANT CHANGE UP (ref 22–31)
COVID-19 SPIKE DOMAIN AB INTERP: POSITIVE
COVID-19 SPIKE DOMAIN ANTIBODY RESULT: >250 U/ML — HIGH
CREAT SERPL-MCNC: 2.3 MG/DL — HIGH (ref 0.5–1.3)
EOSINOPHIL # BLD AUTO: 0 K/UL — SIGNIFICANT CHANGE UP (ref 0–0.5)
EOSINOPHIL NFR BLD AUTO: 0 % — SIGNIFICANT CHANGE UP (ref 0–6)
GLUCOSE BLDC GLUCOMTR-MCNC: 116 MG/DL — HIGH (ref 70–99)
GLUCOSE BLDC GLUCOMTR-MCNC: 145 MG/DL — HIGH (ref 70–99)
GLUCOSE SERPL-MCNC: 153 MG/DL — HIGH (ref 70–99)
HCT VFR BLD CALC: 37.6 % — LOW (ref 39–50)
HGB BLD-MCNC: 12.4 G/DL — LOW (ref 13–17)
LYMPHOCYTES # BLD AUTO: 0.53 K/UL — LOW (ref 1–3.3)
LYMPHOCYTES # BLD AUTO: 3.5 % — LOW (ref 13–44)
MACROCYTES BLD QL: SLIGHT — SIGNIFICANT CHANGE UP
MAGNESIUM SERPL-MCNC: 2.6 MG/DL — SIGNIFICANT CHANGE UP (ref 1.6–2.6)
MANUAL SMEAR VERIFICATION: SIGNIFICANT CHANGE UP
MCHC RBC-ENTMCNC: 32 PG — SIGNIFICANT CHANGE UP (ref 27–34)
MCHC RBC-ENTMCNC: 33 GM/DL — SIGNIFICANT CHANGE UP (ref 32–36)
MCV RBC AUTO: 96.9 FL — SIGNIFICANT CHANGE UP (ref 80–100)
MONOCYTES # BLD AUTO: 1.22 K/UL — HIGH (ref 0–0.9)
MONOCYTES NFR BLD AUTO: 8 % — SIGNIFICANT CHANGE UP (ref 2–14)
NEUTROPHILS # BLD AUTO: 13.51 K/UL — HIGH (ref 1.8–7.4)
NEUTROPHILS NFR BLD AUTO: 88.5 % — HIGH (ref 43–77)
OVALOCYTES BLD QL SMEAR: SLIGHT — SIGNIFICANT CHANGE UP
PLAT MORPH BLD: NORMAL — SIGNIFICANT CHANGE UP
PLATELET # BLD AUTO: 218 K/UL — SIGNIFICANT CHANGE UP (ref 150–400)
POIKILOCYTOSIS BLD QL AUTO: SLIGHT — SIGNIFICANT CHANGE UP
POLYCHROMASIA BLD QL SMEAR: SLIGHT — SIGNIFICANT CHANGE UP
POTASSIUM SERPL-MCNC: 3.9 MMOL/L — SIGNIFICANT CHANGE UP (ref 3.5–5.3)
POTASSIUM SERPL-SCNC: 3.9 MMOL/L — SIGNIFICANT CHANGE UP (ref 3.5–5.3)
PROT SERPL-MCNC: 7 G/DL — SIGNIFICANT CHANGE UP (ref 6–8.3)
RBC # BLD: 3.88 M/UL — LOW (ref 4.2–5.8)
RBC # FLD: 13.3 % — SIGNIFICANT CHANGE UP (ref 10.3–14.5)
RBC BLD AUTO: ABNORMAL
SARS-COV-2 IGG+IGM SERPL QL IA: >250 U/ML — HIGH
SARS-COV-2 IGG+IGM SERPL QL IA: POSITIVE
SODIUM SERPL-SCNC: 138 MMOL/L — SIGNIFICANT CHANGE UP (ref 135–145)
WBC # BLD: 15.26 K/UL — HIGH (ref 3.8–10.5)
WBC # FLD AUTO: 15.26 K/UL — HIGH (ref 3.8–10.5)

## 2021-08-11 PROCEDURE — 92924 PRQ TRLUML C ATHRC 1 ART&/BR: CPT

## 2021-08-11 PROCEDURE — 82550 ASSAY OF CK (CPK): CPT

## 2021-08-11 PROCEDURE — 85025 COMPLETE CBC W/AUTO DIFF WBC: CPT

## 2021-08-11 PROCEDURE — 99239 HOSP IP/OBS DSCHRG MGMT >30: CPT

## 2021-08-11 PROCEDURE — 82962 GLUCOSE BLOOD TEST: CPT

## 2021-08-11 PROCEDURE — 80061 LIPID PANEL: CPT

## 2021-08-11 PROCEDURE — 80053 COMPREHEN METABOLIC PANEL: CPT

## 2021-08-11 PROCEDURE — 93458 L HRT ARTERY/VENTRICLE ANGIO: CPT

## 2021-08-11 PROCEDURE — C1769: CPT

## 2021-08-11 PROCEDURE — 85610 PROTHROMBIN TIME: CPT

## 2021-08-11 PROCEDURE — 83036 HEMOGLOBIN GLYCOSYLATED A1C: CPT

## 2021-08-11 PROCEDURE — 36415 COLL VENOUS BLD VENIPUNCTURE: CPT

## 2021-08-11 PROCEDURE — 85730 THROMBOPLASTIN TIME PARTIAL: CPT

## 2021-08-11 PROCEDURE — 93005 ELECTROCARDIOGRAM TRACING: CPT

## 2021-08-11 PROCEDURE — 83735 ASSAY OF MAGNESIUM: CPT

## 2021-08-11 PROCEDURE — 82553 CREATINE MB FRACTION: CPT

## 2021-08-11 PROCEDURE — C1725: CPT

## 2021-08-11 PROCEDURE — 86769 SARS-COV-2 COVID-19 ANTIBODY: CPT

## 2021-08-11 PROCEDURE — 93010 ELECTROCARDIOGRAM REPORT: CPT

## 2021-08-11 PROCEDURE — C1894: CPT

## 2021-08-11 PROCEDURE — C1887: CPT

## 2021-08-11 RX ORDER — TICAGRELOR 90 MG/1
1 TABLET ORAL
Qty: 60 | Refills: 11
Start: 2021-08-11 | End: 2022-08-05

## 2021-08-11 RX ORDER — TICAGRELOR 90 MG/1
1 TABLET ORAL
Qty: 0 | Refills: 0 | DISCHARGE

## 2021-08-11 RX ORDER — POTASSIUM CHLORIDE 20 MEQ
20 PACKET (EA) ORAL ONCE
Refills: 0 | Status: COMPLETED | OUTPATIENT
Start: 2021-08-11 | End: 2021-08-11

## 2021-08-11 RX ORDER — ASPIRIN/CALCIUM CARB/MAGNESIUM 324 MG
81 TABLET ORAL DAILY
Refills: 0 | Status: DISCONTINUED | OUTPATIENT
Start: 2021-08-11 | End: 2021-08-11

## 2021-08-11 RX ORDER — ASPIRIN/CALCIUM CARB/MAGNESIUM 324 MG
1 TABLET ORAL
Qty: 30 | Refills: 11
Start: 2021-08-11 | End: 2022-08-05

## 2021-08-11 RX ORDER — INSULIN LISPRO 100/ML
VIAL (ML) SUBCUTANEOUS
Refills: 0 | Status: DISCONTINUED | OUTPATIENT
Start: 2021-08-11 | End: 2021-08-11

## 2021-08-11 RX ORDER — PANTOPRAZOLE SODIUM 20 MG/1
1 TABLET, DELAYED RELEASE ORAL
Qty: 30 | Refills: 0
Start: 2021-08-11 | End: 2021-09-09

## 2021-08-11 RX ADMIN — Medication 20 MILLIEQUIVALENT(S): at 10:59

## 2021-08-11 RX ADMIN — TAMSULOSIN HYDROCHLORIDE 0.4 MILLIGRAM(S): 0.4 CAPSULE ORAL at 10:59

## 2021-08-11 RX ADMIN — Medication 100 MILLIGRAM(S): at 06:27

## 2021-08-11 RX ADMIN — ISOSORBIDE MONONITRATE 30 MILLIGRAM(S): 60 TABLET, EXTENDED RELEASE ORAL at 10:59

## 2021-08-11 RX ADMIN — PANTOPRAZOLE SODIUM 40 MILLIGRAM(S): 20 TABLET, DELAYED RELEASE ORAL at 06:27

## 2021-08-11 RX ADMIN — Medication 40 MILLIGRAM(S): at 10:59

## 2021-08-11 RX ADMIN — Medication 81 MILLIGRAM(S): at 13:49

## 2021-08-11 RX ADMIN — TICAGRELOR 90 MILLIGRAM(S): 90 TABLET ORAL at 06:28

## 2021-08-11 RX ADMIN — TENOFOVIR DISOPROXIL FUMARATE 25 MILLIGRAM(S): 300 TABLET, FILM COATED ORAL at 10:59

## 2021-08-11 NOTE — DISCHARGE NOTE NURSING/CASE MANAGEMENT/SOCIAL WORK - NSDCVIVACCINE_GEN_ALL_CORE_FT
influenza, injectable, quadrivalent, preservative free; 11-Nov-2020 12:36; Prince Vu (RN); [employer]; [id number] (Exp. Date: 30-Jun-2021); IntraMuscular; Deltoid Left.; 0.5 milliLiter(s); VIS (VIS Published: 15-Aug-2019, VIS Presented: 11-Nov-2020);

## 2021-08-11 NOTE — DISCHARGE NOTE NURSING/CASE MANAGEMENT/SOCIAL WORK - PATIENT PORTAL LINK FT
You can access the FollowMyHealth Patient Portal offered by [facility] by registering at the following website: [email or web address]. By joining [employer]’s FollowMyHealth portal, you will also be able to view your health information using other applications (apps) compatible with our system.

## 2021-08-16 LAB
ANION GAP SERPL CALC-SCNC: 15 MMOL/L
BUN SERPL-MCNC: 64 MG/DL
CALCIUM SERPL-MCNC: 8.7 MG/DL
CHLORIDE SERPL-SCNC: 101 MMOL/L
CO2 SERPL-SCNC: 24 MMOL/L
CREAT SERPL-MCNC: 1.93 MG/DL
GLUCOSE SERPL-MCNC: 160 MG/DL
POTASSIUM SERPL-SCNC: 4.1 MMOL/L
SODIUM SERPL-SCNC: 140 MMOL/L

## 2021-08-18 DIAGNOSIS — I25.118 ATHEROSCLEROTIC HEART DISEASE OF NATIVE CORONARY ARTERY WITH OTHER FORMS OF ANGINA PECTORIS: ICD-10-CM

## 2021-08-18 DIAGNOSIS — K29.70 GASTRITIS, UNSPECIFIED, WITHOUT BLEEDING: ICD-10-CM

## 2021-08-18 DIAGNOSIS — K21.9 GASTRO-ESOPHAGEAL REFLUX DISEASE WITHOUT ESOPHAGITIS: ICD-10-CM

## 2021-08-18 DIAGNOSIS — E11.9 TYPE 2 DIABETES MELLITUS WITHOUT COMPLICATIONS: ICD-10-CM

## 2021-08-18 DIAGNOSIS — I34.0 NONRHEUMATIC MITRAL (VALVE) INSUFFICIENCY: ICD-10-CM

## 2021-08-18 DIAGNOSIS — Z95.5 PRESENCE OF CORONARY ANGIOPLASTY IMPLANT AND GRAFT: ICD-10-CM

## 2021-08-18 DIAGNOSIS — E11.22 TYPE 2 DIABETES MELLITUS WITH DIABETIC CHRONIC KIDNEY DISEASE: ICD-10-CM

## 2021-08-18 DIAGNOSIS — I25.10 ATHEROSCLEROTIC HEART DISEASE OF NATIVE CORONARY ARTERY WITHOUT ANGINA PECTORIS: ICD-10-CM

## 2021-08-18 DIAGNOSIS — I13.0 HYPERTENSIVE HEART AND CHRONIC KIDNEY DISEASE WITH HEART FAILURE AND STAGE 1 THROUGH STAGE 4 CHRONIC KIDNEY DISEASE, OR UNSPECIFIED CHRONIC KIDNEY DISEASE: ICD-10-CM

## 2021-08-18 DIAGNOSIS — E78.5 HYPERLIPIDEMIA, UNSPECIFIED: ICD-10-CM

## 2021-08-18 DIAGNOSIS — Z87.891 PERSONAL HISTORY OF NICOTINE DEPENDENCE: ICD-10-CM

## 2021-08-18 DIAGNOSIS — I50.32 CHRONIC DIASTOLIC (CONGESTIVE) HEART FAILURE: ICD-10-CM

## 2021-08-18 DIAGNOSIS — N18.9 CHRONIC KIDNEY DISEASE, UNSPECIFIED: ICD-10-CM

## 2021-08-19 ENCOUNTER — APPOINTMENT (OUTPATIENT)
Dept: CARDIOLOGY | Facility: CLINIC | Age: 86
End: 2021-08-19
Payer: MEDICARE

## 2021-08-19 VITALS
RESPIRATION RATE: 18 BRPM | SYSTOLIC BLOOD PRESSURE: 131 MMHG | WEIGHT: 134 LBS | HEART RATE: 63 BPM | DIASTOLIC BLOOD PRESSURE: 69 MMHG | TEMPERATURE: 97.6 F | OXYGEN SATURATION: 97 % | BODY MASS INDEX: 26.17 KG/M2

## 2021-08-19 PROCEDURE — 93000 ELECTROCARDIOGRAM COMPLETE: CPT

## 2021-08-19 PROCEDURE — 99495 TRANSJ CARE MGMT MOD F2F 14D: CPT

## 2021-08-19 NOTE — DISCUSSION/SUMMARY
[FreeTextEntry1] : 86 M HTN hyperlipidemia CAD s/p PCI CKD for f/u after hospitalization.\par Continue ASA and plavix for CAD s/p PCI.\par EKG for CAD.\par Continue medications for HTN and hyperlipidemia.\par CHECK CHEM7 TODAY.\par Patient seen within 14 days of hospital discharge. Medications and cath report reviewed.

## 2021-08-19 NOTE — PHYSICAL EXAM
[Well Developed] : well developed [Well Nourished] : well nourished [No Acute Distress] : no acute distress [Normal Conjunctiva] : normal conjunctiva [Normal Venous Pressure] : normal venous pressure [No Carotid Bruit] : no carotid bruit [Normal S1, S2] : normal S1, S2 [No Rub] : no rub [No Gallop] : no gallop [Clear Lung Fields] : clear lung fields [Good Air Entry] : good air entry [No Respiratory Distress] : no respiratory distress  [Soft] : abdomen soft [Non Tender] : non-tender [No Masses/organomegaly] : no masses/organomegaly [Normal Bowel Sounds] : normal bowel sounds [Normal Gait] : normal gait [No Edema] : no edema [No Cyanosis] : no cyanosis [No Clubbing] : no clubbing [No Varicosities] : no varicosities [No Rash] : no rash [No Skin Lesions] : no skin lesions [Moves all extremities] : moves all extremities [No Focal Deficits] : no focal deficits [Normal Speech] : normal speech [Alert and Oriented] : alert and oriented [Normal memory] : normal memory [de-identified] : 2/6 BELLA CHING

## 2021-08-19 NOTE — HISTORY OF PRESENT ILLNESS
[FreeTextEntry1] : \par 1. HTN: on medications, controlled.\par 2. Hyperlipidemia: on statin. No SE are noted.\par 3. CAD s/p PCI: patient has had mutiple PCIs. He underwent cardiac cath at [facility] on 9/25/18 and he had ISR of prox and mid LAD stents. He underwent repeat cardiac cath [facility] 6/2019 and was found to have ISR to the LAD and had PCI. He is off ASA and is on Brilinta. He was scheduled for staged PCI but was unable to get it done due to multiple admissions and the COVID crisis. \par He was admitted to [facility] in 6/2019 due to hypotension. \par He was readmitted to [facility] due to PNA in 7/2020. \par He is on Brilinta 60 BID.\par \par He underwent repeat cardiac catheterization that showed severe prox LAD stenosis and mid LAD stenosis. He underwent PCI and after confirming that his renal function was stable, he was discharged on 8-11-21.\par \par He reports improvement in CP and SOB.\par He is on ASA and plavix.\par 4. MR: He had an echo at the last visit that showed moderate mitral regurgitation.\par \par 5. SOB: He is on lasix 40 QD with 40 at night on MWF and his SOB and leg edema have improved.\par  \par Patient received both doses of his COVID vaccine. \par

## 2021-08-20 LAB
ANION GAP SERPL CALC-SCNC: 15 MMOL/L
BUN SERPL-MCNC: 65 MG/DL
CALCIUM SERPL-MCNC: 8.8 MG/DL
CHLORIDE SERPL-SCNC: 98 MMOL/L
CO2 SERPL-SCNC: 24 MMOL/L
CREAT SERPL-MCNC: 2.16 MG/DL
GLUCOSE SERPL-MCNC: 160 MG/DL
POTASSIUM SERPL-SCNC: 4.5 MMOL/L
SODIUM SERPL-SCNC: 137 MMOL/L

## 2021-08-31 ENCOUNTER — APPOINTMENT (OUTPATIENT)
Dept: CARDIOLOGY | Facility: CLINIC | Age: 86
End: 2021-08-31
Payer: MEDICARE

## 2021-08-31 VITALS
HEART RATE: 61 BPM | OXYGEN SATURATION: 95 % | WEIGHT: 133 LBS | TEMPERATURE: 97.3 F | SYSTOLIC BLOOD PRESSURE: 118 MMHG | RESPIRATION RATE: 18 BRPM | DIASTOLIC BLOOD PRESSURE: 61 MMHG | BODY MASS INDEX: 25.98 KG/M2

## 2021-08-31 PROCEDURE — 99214 OFFICE O/P EST MOD 30 MIN: CPT

## 2021-08-31 PROCEDURE — 93000 ELECTROCARDIOGRAM COMPLETE: CPT

## 2021-08-31 RX ORDER — ASPIRIN 81 MG/1
81 TABLET ORAL
Qty: 30 | Refills: 5 | Status: ACTIVE | COMMUNITY
Start: 2021-08-31 | End: 1900-01-01

## 2021-08-31 NOTE — PHYSICAL EXAM
[Well Developed] : well developed [Well Nourished] : well nourished [No Acute Distress] : no acute distress [Normal Conjunctiva] : normal conjunctiva [Normal Venous Pressure] : normal venous pressure [No Carotid Bruit] : no carotid bruit [Normal S1, S2] : normal S1, S2 [No Rub] : no rub [No Gallop] : no gallop [Clear Lung Fields] : clear lung fields [Good Air Entry] : good air entry [No Respiratory Distress] : no respiratory distress  [Soft] : abdomen soft [Non Tender] : non-tender [No Masses/organomegaly] : no masses/organomegaly [Normal Bowel Sounds] : normal bowel sounds [Normal Gait] : normal gait [No Edema] : no edema [No Cyanosis] : no cyanosis [No Clubbing] : no clubbing [No Varicosities] : no varicosities [No Rash] : no rash [No Skin Lesions] : no skin lesions [Moves all extremities] : moves all extremities [No Focal Deficits] : no focal deficits [Normal Speech] : normal speech [Alert and Oriented] : alert and oriented [Normal memory] : normal memory [de-identified] : 2/6 BELLA CHING

## 2021-08-31 NOTE — DISCUSSION/SUMMARY
[FreeTextEntry1] : 86 M HTN hyperlipidemia CAD s/p PCI CKD for brachytherapy.\par WILL ARRANGE FOR BRACHYTHERAPY.\par Continue ASA and plavix for CAD s/p PCI.\par Continue medications for HTN.\par Continue statin for hyperlipidemia.\par Patient received his COVID vaccine.\par

## 2021-08-31 NOTE — HISTORY OF PRESENT ILLNESS
[FreeTextEntry1] :  \par 1. HTN: on medications, SE noted.\par 2. Hyperlipidemia: on statin. \par 3. CAD s/p PCI: patient has had mutiple PCIs. He underwent cardiac cath at [facility] on 9/25/18 and he had ISR of prox and mid LAD stents. He underwent repeat cardiac cath [facility] 6/2019 and was found to have ISR to the LAD and had PCI.  \par He was admitted to [facility] in 6/2019 due to hypotension. \par He was readmitted to [facility] due to PNA in 7/2020. \par  \par \par He underwent repeat cardiac catheterization that showed severe prox LAD stenosis and mid LAD stenosis. He underwent PCI and after confirming that his renal function was stable, he was discharged on 8-11-21. \par  \par 4. MR: He had an echo at the last visit that showed moderate mitral regurgitation. His SOB have improved. \par \par 5. SOB: He is on lasix 40 QD with 40 at night on MWF and his SOB and leg edema have improved.\par  \par Patient received both doses of his COVID vaccine. \par \par The patient is scheduled for brachytherapy of the LAD lesion. He is compliant with ASA and Brilinta.\par \par His CKD is stable.\par

## 2021-08-31 NOTE — REASON FOR VISIT
[Hyperlipidemia] : hyperlipidemia [Hypertension] : hypertension [Coronary Artery Disease] : coronary artery disease [FreeTextEntry1] : 86 M HTN hyperlipidemia CKD Cr 1.9 CAD s/p PCI for f/u.\par \par

## 2021-09-20 ENCOUNTER — APPOINTMENT (OUTPATIENT)
Dept: DISASTER EMERGENCY | Facility: CLINIC | Age: 86
End: 2021-09-20

## 2021-09-20 VITALS
RESPIRATION RATE: 17 BRPM | DIASTOLIC BLOOD PRESSURE: 63 MMHG | HEART RATE: 62 BPM | OXYGEN SATURATION: 94 % | SYSTOLIC BLOOD PRESSURE: 134 MMHG | TEMPERATURE: 97 F

## 2021-09-20 DIAGNOSIS — Z01.818 ENCOUNTER FOR OTHER PREPROCEDURAL EXAMINATION: ICD-10-CM

## 2021-09-20 NOTE — H&P ADULT - NSICDXPASTMEDICALHX_GEN_ALL_CORE_FT
PAST MEDICAL HISTORY:  CAD (coronary artery disease)     CHF (congestive heart failure)     CKD (chronic kidney disease)     Diabetes mellitus     DM (diabetes mellitus)     GERD (gastroesophageal reflux disease)     HLD (hyperlipidemia)     HTN (hypertension)

## 2021-09-20 NOTE — H&P ADULT - NSHPLABSRESULTS_GEN_ALL_CORE
13.6   7.92  )-----------( 252      ( 23 Sep 2021 07:25 )             41.2   09-23    137  |  99  |  54<H>  ----------------------------<  200<H>  4.3   |  24  |  2.19<H>    Ca    9.5      23 Sep 2021 07:25    TPro  8.2  /  Alb  4.2  /  TBili  0.5  /  DBili  x   /  AST  17  /  ALT  18  /  AlkPhos  119  09-23  PT/INR - ( 23 Sep 2021 07:25 )   PT: 12.7 sec;   INR: 1.06          PTT - ( 23 Sep 2021 07:25 )  PTT:29.6 sec

## 2021-09-20 NOTE — H&P ADULT - HISTORY OF PRESENT ILLNESS
COVID:   Cardiologist: Dr. Avendano   Pharmacy: [facility]   Escort: N/A, staged procedure     85 y/o Bengali speaking male, former smoker, w/ CONTRAST DYE AND CHLORHEXIDINE ALLERGY and PMHx HTN, HLD, DM, CAD (s/p prior PCI's, most recent 08/10/2021 w/ shockwave/PTCA proximal LAD and mid LAD), HFpEF (EF 66%), moderate MR, CKD (baseline Cr 1.9), anemia (baseline Hgb 10-11), Hepatitis B, GERD, prior admission to [facility] in 07/2020 for pneumonia, and prior admission to [facility] 11/2020 for gastric/esophageal ulcer and gastritis (Aspirin was discontinued at that time) who initially presented to outpatient cardiologist, Dr. Jay Avendano, c/o chest pain and SOB that was similar to when he previously required stents, as per patient's daughter. Patient subsequently underwent cardiac catheterization w/ shockwave/PTCA proximal LAD and mid LAD. During that procedure, patient was reinitiated on Aspirin 81 mg daily in conjunction w/ Brilinta 60 mg BID and Protonix 40 mg daily. Since prior procedure, patient states _______. Patient denies ______. Patient also reports _______ w/ DAPT.     In light of patient's risk factors, ___________, and recent intervention for ISR, patient now presents for recommended brachytherapy.  COVID: ____ (9/20/21 at Dr. Avendano's office)  Pharmacy: [facility]   Escort: N/A    ****verify meds and confirm pt took prednisone prescribed by Dr. Avendano****    *hx obtained from pts Wife and language line  #[phone]    86Y M, Croatian speaking, w/ CONTRAST DYE AND CHLORHEXIDINE ALLERGY and PMHx HTN, HLD, DM, CAD s/p multiple PCIs (most recently 8/10/21 w/ shockwave/PTCA to mLAD ISR w/ residual 20% stenosis, PTCA to pLAD ISR w/ residual 20% stenosis, and patent stents in RCA and RPL), HFpEF, moderate MR, CKD (baseline Cr 1.9), anemia (baseline Hgb 10-11), Hepatitis B, GERD and h/o gastric/esophageal ulcer and gastritis (11/2020), returns for brachytherapy of prox/mid LAD ISR. Pt currently endorses mild SOB and fatigue with exertion, that improves w/ rest. He also reports cramping between shoulder blades after [employer] classes, but denies any chest discomfort. He denies any palpitations, dizziness, syncope, LE edema, orthopnea, PND, N/V, fever, chills or recent sick contact. Most recent Cardiac Cath 8/10/21: LM normal, mLAD 90% ISR s/p shockwave/PTCA w/ residual 20% stenosis, pLAD 70% ISR s/p PTCA w/ residual 20% stenosis, LCx luminal irregularities, mild ISR of mRCA and RPL stents.  In light of pts risk factors, persistent CCS class II/III anginal symptoms and known CAD, pt now presents to [facility] for brachytherapy of LAD ISR. COVID: (9/20/21 at Dr. Avendano's office)  Pharmacy: [facility]   Escort: N/A    *hx obtained from pts Wife and language line  #[phone]  87 y/o Italian speaking Male w/ CONTRAST DYE AND CHLORHEXIDINE ALLERGY and PMHx HTN, HLD, DM, CAD s/p multiple PCIs (most recently 8/10/21 w/ shockwave/PTCA to mLAD ISR w/ residual 20% stenosis, PTCA to pLAD ISR w/ residual 20% stenosis, and patent stents in RCA and RPL), HFpEF, moderate MR, CKD (baseline Cr 1.9), anemia (baseline Hgb 10-11), Hepatitis B, GERD and h/o gastric/esophageal ulcer and gastritis (11/2020), returns for brachytherapy of prox/mid LAD ISR. Pt currently endorses mild SOB and fatigue with exertion, that improves w/ rest. He also reports cramping between shoulder blades after [employer] classes, but denies any chest discomfort. He denies any palpitations, dizziness, syncope, LE edema, orthopnea, PND, N/V, fever, chills or recent sick contact. Most recent Cardiac Cath 8/10/21: LM normal, mLAD 90% ISR s/p shockwave/PTCA w/ residual 20% stenosis, pLAD 70% ISR s/p PTCA w/ residual 20% stenosis, LCx luminal irregularities, mild ISR of mRCA and RPL stents.  In light of pts risk factors, persistent CCS class II/III anginal symptoms and known CAD, pt now presents to [facility] for brachytherapy of LAD ISR.

## 2021-09-20 NOTE — H&P ADULT - ASSESSMENT
86Y M, Belarusian speaking, w/ CONTRAST DYE AND CHLORHEXIDINE ALLERGY and PMHx HTN, HLD, DM, CAD s/p multiple PCIs (most recently 8/10/21 w/ shockwave/PTCA to mLAD ISR w/ residual 20% stenosis, PTCA to pLAD ISR w/ residual 20% stenosis, and patent stents in RCA and RPL), HFpEF, moderate MR, CKD (baseline Cr 1.9), anemia (baseline Hgb 10-11), Hepatitis B, GERD and h/o gastric/esophageal ulcer and gastritis (11/2020), returns for brachytherapy of prox/mid LAD ISR. Pt currently endorses mild SOB and fatigue with exertion, that improves w/ rest.  In light of pts risk factors, persistent CCS class II/III anginal symptoms and known CAD, pt now presents to [facility] for brachytherapy of LAD ISR.    EKG: NSR @62bpm w/ 1st degree AVB and RBBB  ASA 3			Mallampati class: 1         Anginal Class: 3    -H/H = 13.6/41.2. Pt denies BRBPR, hematuria, hematochezia, melena. Pt reports good compliance w/ ASA 81mg QD and Brilinta 90mg BID, w/ his last dose of both last night (9/22/21). Pt loaded w/ ASA 81mg x1 and Brilinta 90mg x1.  -Cr = 2.19. EF 66%. Euvolemic on exam. IV NS @ 150cc/hr x 4hrs started pre procedure  -Pt confirmed he took Prednisone 50mg PO at 6pm 9/22, midnight and 6am 9/23. Benedryl 50mg IVP x1 ordered.    Sedation Plan:   Moderate  Patient Is Suitable Candidate For Sedation?     Yes    Pt consented w/ assistance of [employer] ID #[id number] ([place]ian)  Risks & benefits of procedure and alternative therapy have been explained to the patient including but not limited to: allergic reaction, bleeding with possible need for blood transfusion, infection, renal and vascular compromise, limb damage, arrhythmia, stroke, vessel dissection/perforation, myocardial infarction, and emergent CABG. Informed consent obtained at bedside and included in chart. 86Y M, Sami speaking, w/ CONTRAST DYE AND CHLORHEXIDINE ALLERGY and PMHx HTN, HLD, DM, CAD s/p multiple PCIs (most recently 8/10/21 w/ shockwave/PTCA to mLAD ISR w/ residual 20% stenosis, PTCA to pLAD ISR w/ residual 20% stenosis, and patent stents in RCA and RPL), HFpEF, moderate MR, CKD (baseline Cr 1.9), anemia (baseline Hgb 10-11), Hepatitis B, GERD and h/o gastric/esophageal ulcer and gastritis (11/2020), returns for brachytherapy of prox/mid LAD ISR. Pt currently endorses mild SOB and fatigue with exertion, that improves w/ rest.  In light of pts risk factors, persistent CCS class II/III anginal symptoms and known CAD, pt now presents to [facility] for brachytherapy of LAD ISR.    EKG: NSR @62bpm w/ 1st degree AVB and RBBB  ASA 3			Mallampati class: 1         Anginal Class: 3    -H/H = 13.6/41.2. Pt denies BRBPR, hematuria, hematochezia, melena. Pt reports good compliance w/ ASA 81mg QD and Brilinta 90mg BID, w/ his last dose of both last night (9/22/21). Pt loaded w/ ASA 81mg x1 and Brilinta 90mg x1.  -Cr = 2.19. EF 66%. Euvolemic on exam. IV NS @ 150cc/hr x 4hrs started pre procedure.  -Pt confirmed he took Prednisone 50mg PO at 6pm 9/22, midnight and 6am 9/23. Benedryl 50mg IVP x1 ordered.  -Baseline Cr documented 1.9, Cr 2.19 today. Fellow Dr. Martinez and Dr. Craig made aware.    Sedation Plan:   Moderate  Patient Is Suitable Candidate For Sedation?     Yes    Pt consented w/ assistance of [email or web address] ID #[id number] (Sami)  Risks & benefits of procedure and alternative therapy have been explained to the patient including but not limited to: allergic reaction, bleeding with possible need for blood transfusion, infection, renal and vascular compromise, limb damage, arrhythmia, stroke, vessel dissection/perforation, myocardial infarction, and emergent CABG. Informed consent obtained at bedside and included in chart.

## 2021-09-21 LAB — SARS-COV-2 N GENE NPH QL NAA+PROBE: NOT DETECTED

## 2021-09-23 ENCOUNTER — INPATIENT (INPATIENT)
Facility: HOSPITAL | Age: 86
LOS: 0 days | Discharge: HOME | DRG: 251 | End: 2021-09-24
Attending: INTERNAL MEDICINE | Admitting: INTERNAL MEDICINE
Payer: MEDICARE

## 2021-09-23 ENCOUNTER — TRANSCRIPTION ENCOUNTER (OUTPATIENT)
Age: 86
End: 2021-09-23

## 2021-09-23 LAB
A1C WITH ESTIMATED AVERAGE GLUCOSE RESULT: 7.2 % — HIGH (ref 4–5.6)
ALBUMIN SERPL ELPH-MCNC: 4.2 G/DL — SIGNIFICANT CHANGE UP (ref 3.3–5)
ALP SERPL-CCNC: 119 U/L — SIGNIFICANT CHANGE UP (ref 40–120)
ALT FLD-CCNC: 18 U/L — SIGNIFICANT CHANGE UP (ref 10–45)
ANION GAP SERPL CALC-SCNC: 14 MMOL/L — SIGNIFICANT CHANGE UP (ref 5–17)
APTT BLD: 29.6 SEC — SIGNIFICANT CHANGE UP (ref 27.5–35.5)
AST SERPL-CCNC: 17 U/L — SIGNIFICANT CHANGE UP (ref 10–40)
BASOPHILS # BLD AUTO: 0.02 K/UL — SIGNIFICANT CHANGE UP (ref 0–0.2)
BASOPHILS NFR BLD AUTO: 0.3 % — SIGNIFICANT CHANGE UP (ref 0–2)
BILIRUB SERPL-MCNC: 0.5 MG/DL — SIGNIFICANT CHANGE UP (ref 0.2–1.2)
BUN SERPL-MCNC: 54 MG/DL — HIGH (ref 7–23)
CALCIUM SERPL-MCNC: 9.5 MG/DL — SIGNIFICANT CHANGE UP (ref 8.4–10.5)
CHLORIDE SERPL-SCNC: 99 MMOL/L — SIGNIFICANT CHANGE UP (ref 96–108)
CHOLEST SERPL-MCNC: 137 MG/DL — SIGNIFICANT CHANGE UP
CK MB CFR SERPL CALC: 5.7 NG/ML — SIGNIFICANT CHANGE UP (ref 0–6.7)
CK SERPL-CCNC: 95 U/L — SIGNIFICANT CHANGE UP (ref 30–200)
CO2 SERPL-SCNC: 24 MMOL/L — SIGNIFICANT CHANGE UP (ref 22–31)
CREAT SERPL-MCNC: 2.19 MG/DL — HIGH (ref 0.5–1.3)
EOSINOPHIL # BLD AUTO: 0 K/UL — SIGNIFICANT CHANGE UP (ref 0–0.5)
EOSINOPHIL NFR BLD AUTO: 0 % — SIGNIFICANT CHANGE UP (ref 0–6)
ESTIMATED AVERAGE GLUCOSE: 160 MG/DL — HIGH (ref 68–114)
GLUCOSE BLDC GLUCOMTR-MCNC: 155 MG/DL — HIGH (ref 70–99)
GLUCOSE BLDC GLUCOMTR-MCNC: 182 MG/DL — HIGH (ref 70–99)
GLUCOSE BLDC GLUCOMTR-MCNC: 188 MG/DL — HIGH (ref 70–99)
GLUCOSE SERPL-MCNC: 200 MG/DL — HIGH (ref 70–99)
HCT VFR BLD CALC: 41.2 % — SIGNIFICANT CHANGE UP (ref 39–50)
HDLC SERPL-MCNC: 48 MG/DL — SIGNIFICANT CHANGE UP
HGB BLD-MCNC: 13.6 G/DL — SIGNIFICANT CHANGE UP (ref 13–17)
IMM GRANULOCYTES NFR BLD AUTO: 0.6 % — SIGNIFICANT CHANGE UP (ref 0–1.5)
INR BLD: 1.06 — SIGNIFICANT CHANGE UP (ref 0.88–1.16)
LIPID PNL WITH DIRECT LDL SERPL: 67 MG/DL — SIGNIFICANT CHANGE UP
LYMPHOCYTES # BLD AUTO: 0.79 K/UL — LOW (ref 1–3.3)
LYMPHOCYTES # BLD AUTO: 10 % — LOW (ref 13–44)
MCHC RBC-ENTMCNC: 32 PG — SIGNIFICANT CHANGE UP (ref 27–34)
MCHC RBC-ENTMCNC: 33 GM/DL — SIGNIFICANT CHANGE UP (ref 32–36)
MCV RBC AUTO: 96.9 FL — SIGNIFICANT CHANGE UP (ref 80–100)
MONOCYTES # BLD AUTO: 0.09 K/UL — SIGNIFICANT CHANGE UP (ref 0–0.9)
MONOCYTES NFR BLD AUTO: 1.1 % — LOW (ref 2–14)
NEUTROPHILS # BLD AUTO: 6.97 K/UL — SIGNIFICANT CHANGE UP (ref 1.8–7.4)
NEUTROPHILS NFR BLD AUTO: 88 % — HIGH (ref 43–77)
NON HDL CHOLESTEROL: 89 MG/DL — SIGNIFICANT CHANGE UP
NRBC # BLD: 0 /100 WBCS — SIGNIFICANT CHANGE UP (ref 0–0)
PLATELET # BLD AUTO: 252 K/UL — SIGNIFICANT CHANGE UP (ref 150–400)
POTASSIUM SERPL-MCNC: 4.3 MMOL/L — SIGNIFICANT CHANGE UP (ref 3.5–5.3)
POTASSIUM SERPL-SCNC: 4.3 MMOL/L — SIGNIFICANT CHANGE UP (ref 3.5–5.3)
PROT SERPL-MCNC: 8.2 G/DL — SIGNIFICANT CHANGE UP (ref 6–8.3)
PROTHROM AB SERPL-ACNC: 12.7 SEC — SIGNIFICANT CHANGE UP (ref 10.6–13.6)
RBC # BLD: 4.25 M/UL — SIGNIFICANT CHANGE UP (ref 4.2–5.8)
RBC # FLD: 12.7 % — SIGNIFICANT CHANGE UP (ref 10.3–14.5)
SODIUM SERPL-SCNC: 137 MMOL/L — SIGNIFICANT CHANGE UP (ref 135–145)
TRIGL SERPL-MCNC: 112 MG/DL — SIGNIFICANT CHANGE UP
WBC # BLD: 7.92 K/UL — SIGNIFICANT CHANGE UP (ref 3.8–10.5)
WBC # FLD AUTO: 7.92 K/UL — SIGNIFICANT CHANGE UP (ref 3.8–10.5)

## 2021-09-23 PROCEDURE — 92978 ENDOLUMINL IVUS OCT C 1ST: CPT | Mod: 26,LD

## 2021-09-23 PROCEDURE — 92924 PRQ TRLUML C ATHRC 1 ART&/BR: CPT | Mod: LD

## 2021-09-23 PROCEDURE — 99152 MOD SED SAME PHYS/QHP 5/>YRS: CPT

## 2021-09-23 PROCEDURE — 77263 THER RADIOLOGY TX PLNG CPLX: CPT

## 2021-09-23 PROCEDURE — 77316 BRACHYTX ISODOSE PLAN SIMPLE: CPT | Mod: 26

## 2021-09-23 PROCEDURE — 99221 1ST HOSP IP/OBS SF/LOW 40: CPT | Mod: 25

## 2021-09-23 PROCEDURE — 93306 TTE W/DOPPLER COMPLETE: CPT | Mod: 26

## 2021-09-23 PROCEDURE — 77770 HDR RDNCL NTRSTL/ICAV BRCHTX: CPT | Mod: 26

## 2021-09-23 PROCEDURE — 93010 ELECTROCARDIOGRAM REPORT: CPT | Mod: 76

## 2021-09-23 PROCEDURE — 77470 SPECIAL RADIATION TREATMENT: CPT | Mod: 26

## 2021-09-23 PROCEDURE — 93010 ELECTROCARDIOGRAM REPORT: CPT | Mod: 77

## 2021-09-23 PROCEDURE — 77290 THER RAD SIMULAJ FIELD CPLX: CPT | Mod: 26

## 2021-09-23 PROCEDURE — 92974 CATH PLACE CARDIO BRACHYTX: CPT

## 2021-09-23 RX ORDER — METOPROLOL TARTRATE 50 MG
1 TABLET ORAL
Qty: 0 | Refills: 0 | DISCHARGE

## 2021-09-23 RX ORDER — TAMSULOSIN HYDROCHLORIDE 0.4 MG/1
1 CAPSULE ORAL
Qty: 0 | Refills: 0 | DISCHARGE

## 2021-09-23 RX ORDER — METOPROLOL TARTRATE 50 MG
10 TABLET ORAL
Qty: 0 | Refills: 0 | DISCHARGE

## 2021-09-23 RX ORDER — TICAGRELOR 90 MG/1
90 TABLET ORAL ONCE
Refills: 0 | Status: DISCONTINUED | OUTPATIENT
Start: 2021-09-23 | End: 2021-09-23

## 2021-09-23 RX ORDER — ASPIRIN/CALCIUM CARB/MAGNESIUM 324 MG
81 TABLET ORAL DAILY
Refills: 0 | Status: DISCONTINUED | OUTPATIENT
Start: 2021-09-23 | End: 2021-09-24

## 2021-09-23 RX ORDER — SODIUM CHLORIDE 9 MG/ML
500 INJECTION INTRAMUSCULAR; INTRAVENOUS; SUBCUTANEOUS
Refills: 0 | Status: DISCONTINUED | OUTPATIENT
Start: 2021-09-23 | End: 2021-09-24

## 2021-09-23 RX ORDER — ACETAMINOPHEN 500 MG
650 TABLET ORAL EVERY 6 HOURS
Refills: 0 | Status: DISCONTINUED | OUTPATIENT
Start: 2021-09-23 | End: 2021-09-24

## 2021-09-23 RX ORDER — NITROGLYCERIN 6.5 MG
1 CAPSULE, EXTENDED RELEASE ORAL
Qty: 0 | Refills: 0 | DISCHARGE

## 2021-09-23 RX ORDER — ATORVASTATIN CALCIUM 80 MG/1
20 TABLET, FILM COATED ORAL AT BEDTIME
Refills: 0 | Status: DISCONTINUED | OUTPATIENT
Start: 2021-09-23 | End: 2021-09-24

## 2021-09-23 RX ORDER — ICOSAPENT ETHYL 500 MG/1
2 CAPSULE, LIQUID FILLED ORAL
Qty: 0 | Refills: 0 | DISCHARGE

## 2021-09-23 RX ORDER — ROSUVASTATIN CALCIUM 5 MG/1
1 TABLET ORAL
Qty: 0 | Refills: 0 | DISCHARGE

## 2021-09-23 RX ORDER — INSULIN LISPRO 100/ML
VIAL (ML) SUBCUTANEOUS ONCE
Refills: 0 | Status: COMPLETED | OUTPATIENT
Start: 2021-09-23 | End: 2021-09-24

## 2021-09-23 RX ORDER — METOPROLOL TARTRATE 50 MG
100 TABLET ORAL DAILY
Refills: 0 | Status: DISCONTINUED | OUTPATIENT
Start: 2021-09-24 | End: 2021-09-24

## 2021-09-23 RX ORDER — INSULIN GLARGINE 100 [IU]/ML
20 INJECTION, SOLUTION SUBCUTANEOUS
Qty: 0 | Refills: 0 | DISCHARGE

## 2021-09-23 RX ORDER — SODIUM CHLORIDE 9 MG/ML
1000 INJECTION, SOLUTION INTRAVENOUS
Refills: 0 | Status: DISCONTINUED | OUTPATIENT
Start: 2021-09-23 | End: 2021-09-24

## 2021-09-23 RX ORDER — TENOFOVIR DISOPROXIL FUMARATE 300 MG/1
1 TABLET, FILM COATED ORAL
Qty: 0 | Refills: 0 | DISCHARGE

## 2021-09-23 RX ORDER — ICOSAPENT ETHYL 500 MG/1
1 CAPSULE, LIQUID FILLED ORAL
Qty: 0 | Refills: 0 | DISCHARGE

## 2021-09-23 RX ORDER — FUROSEMIDE 40 MG
1 TABLET ORAL
Qty: 0 | Refills: 0 | DISCHARGE

## 2021-09-23 RX ORDER — DEXTROSE 50 % IN WATER 50 %
25 SYRINGE (ML) INTRAVENOUS ONCE
Refills: 0 | Status: DISCONTINUED | OUTPATIENT
Start: 2021-09-23 | End: 2021-09-24

## 2021-09-23 RX ORDER — GLUCAGON INJECTION, SOLUTION 0.5 MG/.1ML
1 INJECTION, SOLUTION SUBCUTANEOUS ONCE
Refills: 0 | Status: DISCONTINUED | OUTPATIENT
Start: 2021-09-23 | End: 2021-09-24

## 2021-09-23 RX ORDER — DEXTROSE 50 % IN WATER 50 %
15 SYRINGE (ML) INTRAVENOUS ONCE
Refills: 0 | Status: DISCONTINUED | OUTPATIENT
Start: 2021-09-23 | End: 2021-09-24

## 2021-09-23 RX ORDER — METOPROLOL TARTRATE 50 MG
100 TABLET ORAL DAILY
Refills: 0 | Status: DISCONTINUED | OUTPATIENT
Start: 2021-09-23 | End: 2021-09-23

## 2021-09-23 RX ORDER — TAMSULOSIN HYDROCHLORIDE 0.4 MG/1
0.4 CAPSULE ORAL AT BEDTIME
Refills: 0 | Status: DISCONTINUED | OUTPATIENT
Start: 2021-09-23 | End: 2021-09-24

## 2021-09-23 RX ORDER — TENOFOVIR DISOPROXIL FUMARATE 300 MG/1
25 TABLET, FILM COATED ORAL DAILY
Refills: 0 | Status: DISCONTINUED | OUTPATIENT
Start: 2021-09-23 | End: 2021-09-24

## 2021-09-23 RX ORDER — ASPIRIN/CALCIUM CARB/MAGNESIUM 324 MG
81 TABLET ORAL ONCE
Refills: 0 | Status: COMPLETED | OUTPATIENT
Start: 2021-09-23 | End: 2021-09-23

## 2021-09-23 RX ORDER — TICAGRELOR 90 MG/1
90 TABLET ORAL EVERY 12 HOURS
Refills: 0 | Status: DISCONTINUED | OUTPATIENT
Start: 2021-09-24 | End: 2021-09-24

## 2021-09-23 RX ORDER — DEXTROSE 50 % IN WATER 50 %
12.5 SYRINGE (ML) INTRAVENOUS ONCE
Refills: 0 | Status: DISCONTINUED | OUTPATIENT
Start: 2021-09-23 | End: 2021-09-24

## 2021-09-23 RX ORDER — DIPHENHYDRAMINE HCL 50 MG
50 CAPSULE ORAL ONCE
Refills: 0 | Status: COMPLETED | OUTPATIENT
Start: 2021-09-23 | End: 2021-09-23

## 2021-09-23 RX ORDER — FUROSEMIDE 40 MG
40 TABLET ORAL DAILY
Refills: 0 | Status: DISCONTINUED | OUTPATIENT
Start: 2021-09-23 | End: 2021-09-24

## 2021-09-23 RX ADMIN — Medication 81 MILLIGRAM(S): at 09:56

## 2021-09-23 RX ADMIN — SODIUM CHLORIDE 75 MILLILITER(S): 9 INJECTION INTRAMUSCULAR; INTRAVENOUS; SUBCUTANEOUS at 13:14

## 2021-09-23 RX ADMIN — Medication 650 MILLIGRAM(S): at 20:20

## 2021-09-23 RX ADMIN — Medication 50 MILLIGRAM(S): at 10:10

## 2021-09-23 RX ADMIN — Medication 650 MILLIGRAM(S): at 15:05

## 2021-09-23 RX ADMIN — Medication 650 MILLIGRAM(S): at 19:28

## 2021-09-23 RX ADMIN — TAMSULOSIN HYDROCHLORIDE 0.4 MILLIGRAM(S): 0.4 CAPSULE ORAL at 21:58

## 2021-09-23 RX ADMIN — TENOFOVIR DISOPROXIL FUMARATE 25 MILLIGRAM(S): 300 TABLET, FILM COATED ORAL at 21:57

## 2021-09-23 RX ADMIN — ATORVASTATIN CALCIUM 20 MILLIGRAM(S): 80 TABLET, FILM COATED ORAL at 21:58

## 2021-09-23 RX ADMIN — Medication 650 MILLIGRAM(S): at 14:05

## 2021-09-23 NOTE — DISCHARGE NOTE PROVIDER - NSDCMRMEDTOKEN_GEN_ALL_CORE_FT
Aspirin Enteric Coated 81 mg oral delayed release tablet: 1 tab(s) orally once a day  furosemide 40 mg oral tablet: 1 tab(s) orally every other day  metoprolol succinate 25 mg oral tablet, extended release: 1 tab(s) orally once a day  rosuvastatin 5 mg oral tablet: 1 tab(s) orally once a day  tamsulosin 0.4 mg oral capsule: 1 cap(s) orally once a day  Vascepa 1 g oral capsule: 2 cap(s) orally once a day  Vemlidy 25 mg oral tablet: 1 tab(s) orally once a day   Aspirin Enteric Coated 81 mg oral delayed release tablet: 1 tab(s) orally once a day  furosemide 40 mg oral tablet: 1 tab(s) orally every other day. RESUME 9/25  metoprolol succinate 25 mg oral tablet, extended release: 1 tab(s) orally once a day  rosuvastatin 5 mg oral tablet: 1 tab(s) orally once a day  tamsulosin 0.4 mg oral capsule: 1 cap(s) orally once a day  ticagrelor 90 mg oral tablet: 1 tab(s) orally every 12 hours  Vascepa 1 g oral capsule: 2 cap(s) orally once a day  Vemlidy 25 mg oral tablet: 1 tab(s) orally once a day

## 2021-09-23 NOTE — DISCHARGE NOTE PROVIDER - NSDCFUADDINST_GEN_ALL_CORE_FT
- NEVER MISS A DOSE OF ASPIRIN OR BRILINTA. IF YOU DO, YOU ARE AT RISK OF YOUR STENTS CLOSING AND HAVING A HEART ATTACK. DO NOT STOP THESE TWO MEDICATIONS UNLESS INSTRUCTED TO DO SO BY YOUR CARDIOLOGIST.   - Do NOT drive or operate hazardous machinery for 24 hours. Limit your physical activity for 24-48 hours. Do NOT engage in sports, heavy work or heavy lifting for 72 hours.   - You MAY shower BUT no TUB BATHS, HOT TUBS OR SWIMMING FOR 5 DAYS  - Your procedure was done through your right groin. If you observe flank bleeding from the puncture site, it is an emergency. Please put direct pressure on the site and go directly to the ER. Bleeding under the skin may also occur and a small "black and blue" may be expected. If the area appears to be expanding or swelling around the puncture site, apply manual compression and go immediately to the nearest ER. If your foot/leg becomes cool or blue and/or you are unable to move it, this must be treated as an emergency, go directly to the nearest ER. Look for signs of infection in the groin: fever, red streaking of the leg, obvious pus formation and pain.

## 2021-09-23 NOTE — DISCHARGE NOTE PROVIDER - NSDCCPCAREPLAN_GEN_ALL_CORE_FT
PRINCIPAL DISCHARGE DIAGNOSIS  Diagnosis: CAD (coronary artery disease)  Assessment and Plan of Treatment: You have a diagnosis of coronary artery disease and received brachytherapy to your proximal and mid left anterior descending coronary artery. You have been started on Aspirin 81mg daily and Brilinta(Ticagrelor) 90mg Twice Daily. You MUST continue taking the daily Aspirin and Brilinta to ensure your stent does not close. DO NOT STOP THESE MEDICATIONS FOR ANY REASON UNLESS OTHERWISE INDICATED BY YOUR CARDIOLOGIST BECAUSE THIS WILL PUT YOU AT RISK FOR A HEART ATTACK. You should refrain from strenuous activity and heavy lifting for 1 week. Please make a follow up appointment with your cardiologist within 1-2 weeks of your discharge. All of your prescriptions have been sent electronically to your pharmacy.  The catheter from your groin was removed and bleeding was stopped with a closure device.  After 24hours you may take off the dressing and shower. Wash the site with soap and water.  There is no need to put on another bandage.  Avoid tub baths, hot tubs or swimming for 5 days.      Call the Interventional Cardiology and Vascular Team at [phone] or [phone] if any of following occur pertaining to your vascular access site: Bleeding or hematoma formation (collection of blood under the skin), drainage or redness at the puncture site, numbness, decrease in strength, coolness or pale coloration of skin of the leg or hand.       PRINCIPAL DISCHARGE DIAGNOSIS  Diagnosis: CAD (coronary artery disease)  Assessment and Plan of Treatment: - You have a known diagnosis of coronary artery disease with stent placement previously.   - You came to the hospital for a planned cardiac cath and received brachytherapy to the left anterior descending artery to PREVENT restenosis to the stent placed prior.  - NEVER MISS A DOSE OF ASPIRIN OR BRILINTA. IF YOU DO, YOU ARE AT RISK OF YOUR STENTS CLOSING AND HAVING A HEART ATTACK. DO NOT STOP THESE TWO MEDICATIONS UNLESS INSTRUCTED TO DO SO BY YOUR CARDIOLOGIST.   - Do NOT drive or operate hazardous machinery for 24 hours. Limit your physical activity for 24-48 hours. Do NOT engage in sports, heavy work or heavy lifting for 72 hours.   - You MAY shower BUT no TUB BATHS, HOT TUBS OR SWIMMING FOR 5 DAYS  - Your procedure was done through your right groin. If you observe flank bleeding from the puncture site, it is an emergency. Please put direct pressure on the site and go directly to the ER. Bleeding under the skin may also occur and a small "black and blue" may be expected. If the area appears to be expanding or swelling around the puncture site, apply manual compression and go immediately to the nearest ER. If your foot/leg becomes cool or blue and/or you are unable to move it, this must be treated as an emergency, go directly to the nearest ER. Look for signs of infection in the groin: fever, red streaking of the leg, obvious pus formation and pain.         PRINCIPAL DISCHARGE DIAGNOSIS  Diagnosis: CAD (coronary artery disease)  Assessment and Plan of Treatment: - You have a known diagnosis of coronary artery disease with stent placement previously.   - You came to the hospital for a planned cardiac cath and received brachytherapy to the left anterior descending artery to PREVENT restenosis to the stent placed prior.  - NEVER MISS A DOSE OF ASPIRIN OR BRILINTA. IF YOU DO, YOU ARE AT RISK OF YOUR STENTS CLOSING AND HAVING A HEART ATTACK. DO NOT STOP THESE TWO MEDICATIONS UNLESS INSTRUCTED TO DO SO BY YOUR CARDIOLOGIST.   - Do NOT drive or operate hazardous machinery for 24 hours. Limit your physical activity for 24-48 hours. Do NOT engage in sports, heavy work or heavy lifting for 72 hours.   - You MAY shower BUT no TUB BATHS, HOT TUBS OR SWIMMING FOR 5 DAYS  - Your procedure was done through your right groin. If you observe flank bleeding from the puncture site, it is an emergency. Please put direct pressure on the site and go directly to the ER. Bleeding under the skin may also occur and a small "black and blue" may be expected. If the area appears to be expanding or swelling around the puncture site, apply manual compression and go immediately to the nearest ER. If your foot/leg becomes cool or blue and/or you are unable to move it, this must be treated as an emergency, go directly to the nearest ER. Look for signs of infection in the groin: fever, red streaking of the leg, obvious pus formation and pain.      SECONDARY DISCHARGE DIAGNOSES  Diagnosis: Chronic kidney disease, unspecified CKD stage  Assessment and Plan of Treatment: You have a known history of chronic kidney disease. Chronic kidney disease (CKD) is when the kidneys stop working as well as they should. When they are working normally, the kidneys filter the blood and remove waste and excess salt and water. Your creatinine on the day of discharge was slghtly higher than your baseline as it was 2.47. Please RESUME Lasix 9/25/21.

## 2021-09-23 NOTE — DISCHARGE NOTE PROVIDER - CARE PROVIDER_API CALL
Jay Avendano)  Cardiovascular Disease; Internal Medicine  [address], [address] [place] [address]  Phone: [phone]  Fax: [phone]  Follow Up Time: 2 weeks

## 2021-09-23 NOTE — CHART NOTE - NSCHARTNOTEFT_GEN_A_CORE
PA called to bedside. Patient complaining of 10/10 right sided CP. BP found to be 68/42mmHg. HR: 60s. Echo performed at bedside by fellow, Suraj. Fluids opened (500 cc bolus) and patient placed in reverse trendelenberg. Repeat EKG unchanged. Patient stating pain improving to 6/10. Will continue to monitor and order formal echocardiogram.

## 2021-09-23 NOTE — DISCHARGE NOTE PROVIDER - HOSPITAL COURSE
INCOMPLETE     85 y/o Tajik speaking male w/ CONTRAST DYE AND CHLORHEXIDINE ALLERGY and PMHx HTN, HLD, DM, CAD (s/p multiple PCI's, most recent 08/10/2021 w/ shockwave/PTCA to mLAD ISR w/ residual 20% stenosis, PTCA to pLAD ISR w/ residual 20% stenosis, and patent stents in RCA and RPL), HFpEF, moderate MR, CKD (baseline Cr 1.9), anemia (baseline Hgb 10-11), Hepatitis B, GERD, and h/o gastric/esophageal ulcer/gastritis (in 11/2020) who returns for brachytherapy of proximal/mid LAD ISR. Patient continued to endorse mild SOB and fatigue w/ exertion and improves w/ rest. Patient also reported cramping between shoulder blades after [employer] classes, but denied any chest discomfort. Patient denied any palpitations, dizziness, syncope, LE edema, orthopnea, PND, nausea/vomiting, fever, chills or recent sick contact. Cardiac Catheterization (08/10/2021) revealed LM normal, mid LAD 90% ISR s/p shockwave/PTCA w/ residual 20% stenosis, proximal LAD 70% ISR s/p PTCA w/ residual 20% stenosis, LCx luminal irregularities, mild ISR of mRCA and RPL stents.  In light of pts risk factors, persistent CCS class II/III anginal symptoms and known CAD, pt now presents to [facility] for brachytherapy of LAD ISR.   INCOMPLETE     87 y/o Persian speaking male w/ CONTRAST DYE AND CHLORHEXIDINE ALLERGY and PMHx HTN, HLD, DM, CAD (s/p multiple PCI's, most recent 08/10/2021 w/ shockwave/PTCA to mLAD ISR w/ residual 20% stenosis, PTCA to pLAD ISR w/ residual 20% stenosis, and patent stents in RCA and RPL), HFpEF, moderate MR, CKD (baseline Cr 1.9), anemia (baseline Hgb 10-11), Hepatitis B, GERD, and h/o gastric/esophageal ulcer/gastritis (in 11/2020) who returns for brachytherapy of proximal/mid LAD ISR. Patient continued to endorse mild SOB and fatigue w/ exertion and improves w/ rest. Patient also reported cramping between shoulder blades after [employer] classes, but denied any chest discomfort. Patient denied any palpitations, dizziness, syncope, LE edema, orthopnea, PND, nausea/vomiting, fever, chills or recent sick contact. Cardiac Catheterization (08/10/2021) revealed LM normal, mid LAD 90% ISR s/p shockwave/PTCA w/ residual 20% stenosis, proximal LAD 70% ISR s/p PTCA w/ residual 20% stenosis, LCx luminal irregularities, mild ISR of mRCA and RPL stents. In light of patient's risk factors, ongoing CCS Class II/III anginal equivalent symptoms, and recent ISR intervention, patient presented for brachytherapy.     Patient is now s/p cardiac catheterization w/ RLCA and brachytherapy  proximal/mid LAD. Right groin access was utilized and Angioseal closure device was used. Post-procedure patient complained of right sided chest pain w/ BP found to be 68/42 mmHg and HR in the 60's. Limited Echocardiogram was performed at bedside by fellow, Dr. Suraj Peralta, and was within normal limits. Patient was given 500 cc fluid bolus and patient was placed in Trendelenburg. EKG was performed and unchanged. Patient then stated chest pain was improved and plan was made to continue to monitor and obtain a formal Echocardiogram the next day. Echocardiogram on 09/24/2021 showed __________________. Patient was seen and examined at bedside on 09/24/2021 AM and __________. Right groin access site remained stable. No significant events were noted overnight on telemetry and repeat EKG was w/o acute ischemic changes. Repeat labs were reviewed and remained stable at patient's baseline. Patient has now been medically cleared for discharge as per Dr. Huggins. Patient has been given appropriate discharge instructions including medication regimen, access site management, and follow up. Any prescriptions the patient requires refills on have been e-prescribed to patient's preferred pharmacy.     VS Stable   Gen: NAD, A&O x3  Cards: RRR, clear S1 and S2 without murmur  Pulm: CTA B/L without w/r/r  Right groin: No hematoma or ooze, peripheral pulses 2+ B/L  Abd: soft, NT  Ext: no LE edema or ulcerations B/L           Cardiac Rehab (Post PCI):            - Education on benefits of Cardiac Rehab provided to patient: Yes         - Referral and Prescription Given for Cardiac Rehab: Yes         - Patient given list of locations & instructed to contact their insurance company to review list of participating providers    Statin Prescribed (PCI this admission): Yes  DAPT: Prescriptions sent for Aspirin and Brilinta to patient's preferred pharmacy.        85 y/o Bengali speaking male w/ CONTRAST DYE AND CHLORHEXIDINE ALLERGY and PMHx HTN, HLD, DM, CAD (s/p multiple PCI's, most recent 08/10/2021 w/ shockwave/PTCA to mLAD ISR w/ residual 20% stenosis, PTCA to pLAD ISR w/ residual 20% stenosis, and patent stents in RCA and RPL), HFpEF, moderate MR, CKD (baseline Cr 1.9), anemia (baseline Hgb 10-11), Hepatitis B, GERD, and h/o gastric/esophageal ulcer/gastritis (in 11/2020) who returns for brachytherapy of proximal/mid LAD ISR. Patient continued to endorse mild SOB and fatigue w/ exertion and improves w/ rest. Patient also reported cramping between shoulder blades after [employer] classes, but denied any chest discomfort. Patient denied any palpitations, dizziness, syncope, LE edema, orthopnea, PND, nausea/vomiting, fever, chills or recent sick contact. Cardiac Catheterization (08/10/2021) revealed LM normal, mid LAD 90% ISR s/p shockwave/PTCA w/ residual 20% stenosis, proximal LAD 70% ISR s/p PTCA w/ residual 20% stenosis, LCx luminal irregularities, mild ISR of mRCA and RPL stents. In light of patient's risk factors, ongoing CCS Class II/III anginal equivalent symptoms, and recent ISR intervention, patient presented for brachytherapy.     Patient is now s/p cardiac catheterization w/ RLCA and brachytherapy  proximal/mid LAD. Right groin access was utilized and Angioseal closure device was used. Post-procedure patient complained of right sided chest pain w/ BP found to be 68/42 mmHg and HR in the 60's. Limited Echocardiogram was performed at bedside by fellow, Dr. Suraj Peralta, and was within normal limits. Patient was given 500 cc fluid bolus and patient was placed in Trendelenburg. EKG was performed and unchanged. Patient then stated chest pain was improved and plan was made to continue to monitor and obtain a formal Echocardiogram the next day. Echocardiogram on 09/24/2021 showed mild AR, no other significant valvular disease, mild concentric LVH, EF 60-65%, no pericardial effusion. Patient was seen and examined at bedside on 09/24/2021 AM and remains hemodynamically stable.  Right groin access site remained stable. No significant events were noted overnight on telemetry and repeat EKG was w/o acute ischemic changes. Repeat labs were reviewed and remained stable at patient's baseline(creat elevated- will resume home lasix 9/25).  Patient has now been medically cleared for discharge as per Dr. Huggins. Patient has been given appropriate discharge instructions including medication regimen, access site management, and follow up. Any prescriptions the patient requires refills on have been e-prescribed to patient's preferred pharmacy.     Cardiac Rehab (Post PCI):            - Education on benefits of Cardiac Rehab provided to patient: Yes         - Referral and Prescription Given for Cardiac Rehab: Yes         - Patient given list of locations & instructed to contact their insurance company to review list of participating providers    Statin Prescribed (PCI this admission): Yes  DAPT: Prescriptions sent for Aspirin and Brilinta to patient's preferred pharmacy.      85 y/o Syriac speaking male w/ CONTRAST DYE AND CHLORHEXIDINE ALLERGY and PMHx HTN, HLD, DM, CAD (s/p multiple PCI's, most recent 08/10/2021 w/ shockwave/PTCA to mLAD ISR w/ residual 20% stenosis, PTCA to pLAD ISR w/ residual 20% stenosis, and patent stents in RCA and RPL), HFpEF, moderate MR, CKD (baseline Cr 1.9), anemia (baseline Hgb 10-11), Hepatitis B, GERD, and h/o gastric/esophageal ulcer/gastritis (in 11/2020) who returns for brachytherapy of proximal/mid LAD ISR. Patient continued to endorse mild SOB and fatigue w/ exertion and improves w/ rest. Patient also reported cramping between shoulder blades after [employer] classes, but denied any chest discomfort. Patient denied any palpitations, dizziness, syncope, LE edema, orthopnea, PND, nausea/vomiting, fever, chills or recent sick contact. Cardiac Catheterization (08/10/2021) revealed LM normal, mid LAD 90% ISR s/p shockwave/PTCA w/ residual 20% stenosis, proximal LAD 70% ISR s/p PTCA w/ residual 20% stenosis, LCx luminal irregularities, mild ISR of mRCA and RPL stents. In light of patient's risk factors, ongoing CCS Class II/III anginal equivalent symptoms, and recent ISR intervention, patient presented for brachytherapy.     Patient is now s/p cardiac catheterization w/ RLCA and brachytherapy  proximal/mid LAD. Right groin access was utilized and Angioseal closure device was used. Post-procedure patient complained of right sided chest pain w/ BP found to be 68/42 mmHg and HR in the 60's. Limited Echocardiogram was performed at bedside by fellow, Dr. Suraj Peralta, and was within normal limits. Patient was given 500 cc fluid bolus and patient was placed in Trendelenburg. EKG was performed and unchanged. Patient then stated chest pain was improved and plan was made to continue to monitor and obtain a formal Echocardiogram the next day. Echocardiogram on 09/24/2021 showed mild AR, no other significant valvular disease, mild concentric LVH, EF 60-65%, no pericardial effusion. Patient was seen and examined at bedside on 09/24/2021 AM and remains hemodynamically stable.  Right groin access site remained stable. No significant events were noted overnight on telemetry and repeat EKG was w/o acute ischemic changes. Repeat labs were reviewed and remained stable at patient's baseline(creat elevated- will resume home lasix 9/25).  Patient has now been medically cleared for discharge. Patient has been given appropriate discharge instructions including medication regimen, access site management, and follow up. Any prescriptions the patient requires refills on have been e-prescribed to patient's preferred pharmacy.     Cardiac Rehab (Post PCI):   Received RX on prior admission (8/2021) s/p stent placement         Statin Prescribed (PCI this admission): Yes  DAPT: Prescriptions sent for Aspirin and Brilinta to patient's preferred pharmacy.     DC Meds:   ASA 81mg  Brilinta 90mg BID  Lasix 40mg - RESUME 9/25  Metoprolol Succinate 25mg   Rosuvastatin 5mg  Vascepa 1g

## 2021-09-24 ENCOUNTER — TRANSCRIPTION ENCOUNTER (OUTPATIENT)
Age: 86
End: 2021-09-24

## 2021-09-24 VITALS — TEMPERATURE: 98 F

## 2021-09-24 LAB
ALBUMIN SERPL ELPH-MCNC: 3.4 G/DL — SIGNIFICANT CHANGE UP (ref 3.3–5)
ALP SERPL-CCNC: 123 U/L — HIGH (ref 40–120)
ALT FLD-CCNC: 14 U/L — SIGNIFICANT CHANGE UP (ref 10–45)
ANION GAP SERPL CALC-SCNC: 11 MMOL/L — SIGNIFICANT CHANGE UP (ref 5–17)
ANISOCYTOSIS BLD QL: SLIGHT — SIGNIFICANT CHANGE UP
AST SERPL-CCNC: 18 U/L — SIGNIFICANT CHANGE UP (ref 10–40)
BASOPHILS # BLD AUTO: 0 K/UL — SIGNIFICANT CHANGE UP (ref 0–0.2)
BASOPHILS NFR BLD AUTO: 0 % — SIGNIFICANT CHANGE UP (ref 0–2)
BILIRUB SERPL-MCNC: 0.2 MG/DL — SIGNIFICANT CHANGE UP (ref 0.2–1.2)
BUN SERPL-MCNC: 67 MG/DL — HIGH (ref 7–23)
BURR CELLS BLD QL SMEAR: PRESENT — SIGNIFICANT CHANGE UP
CALCIUM SERPL-MCNC: 8.7 MG/DL — SIGNIFICANT CHANGE UP (ref 8.4–10.5)
CHLORIDE SERPL-SCNC: 106 MMOL/L — SIGNIFICANT CHANGE UP (ref 96–108)
CO2 SERPL-SCNC: 23 MMOL/L — SIGNIFICANT CHANGE UP (ref 22–31)
COVID-19 SPIKE DOMAIN AB INTERP: POSITIVE
COVID-19 SPIKE DOMAIN ANTIBODY RESULT: >250 U/ML — HIGH
CREAT SERPL-MCNC: 2.47 MG/DL — HIGH (ref 0.5–1.3)
EOSINOPHIL # BLD AUTO: 0 K/UL — SIGNIFICANT CHANGE UP (ref 0–0.5)
EOSINOPHIL NFR BLD AUTO: 0 % — SIGNIFICANT CHANGE UP (ref 0–6)
GIANT PLATELETS BLD QL SMEAR: PRESENT — SIGNIFICANT CHANGE UP
GLUCOSE BLDC GLUCOMTR-MCNC: 160 MG/DL — HIGH (ref 70–99)
GLUCOSE SERPL-MCNC: 175 MG/DL — HIGH (ref 70–99)
HCT VFR BLD CALC: 32.5 % — LOW (ref 39–50)
HGB BLD-MCNC: 10.5 G/DL — LOW (ref 13–17)
HYPOCHROMIA BLD QL: SLIGHT — SIGNIFICANT CHANGE UP
LYMPHOCYTES # BLD AUTO: 0.97 K/UL — LOW (ref 1–3.3)
LYMPHOCYTES # BLD AUTO: 6.2 % — LOW (ref 13–44)
MACROCYTES BLD QL: SLIGHT — SIGNIFICANT CHANGE UP
MAGNESIUM SERPL-MCNC: 2.6 MG/DL — SIGNIFICANT CHANGE UP (ref 1.6–2.6)
MANUAL SMEAR VERIFICATION: SIGNIFICANT CHANGE UP
MCHC RBC-ENTMCNC: 32 PG — SIGNIFICANT CHANGE UP (ref 27–34)
MCHC RBC-ENTMCNC: 32.3 GM/DL — SIGNIFICANT CHANGE UP (ref 32–36)
MCV RBC AUTO: 99.1 FL — SIGNIFICANT CHANGE UP (ref 80–100)
MICROCYTES BLD QL: SLIGHT — SIGNIFICANT CHANGE UP
MONOCYTES # BLD AUTO: 0.95 K/UL — HIGH (ref 0–0.9)
MONOCYTES NFR BLD AUTO: 6.1 % — SIGNIFICANT CHANGE UP (ref 2–14)
NEUTROPHILS # BLD AUTO: 13.66 K/UL — HIGH (ref 1.8–7.4)
NEUTROPHILS NFR BLD AUTO: 87.7 % — HIGH (ref 43–77)
OVALOCYTES BLD QL SMEAR: SLIGHT — SIGNIFICANT CHANGE UP
PLAT MORPH BLD: ABNORMAL
PLATELET # BLD AUTO: 225 K/UL — SIGNIFICANT CHANGE UP (ref 150–400)
POLYCHROMASIA BLD QL SMEAR: SLIGHT — SIGNIFICANT CHANGE UP
POTASSIUM SERPL-MCNC: 4.4 MMOL/L — SIGNIFICANT CHANGE UP (ref 3.5–5.3)
POTASSIUM SERPL-SCNC: 4.4 MMOL/L — SIGNIFICANT CHANGE UP (ref 3.5–5.3)
PROT SERPL-MCNC: 6.8 G/DL — SIGNIFICANT CHANGE UP (ref 6–8.3)
RBC # BLD: 3.28 M/UL — LOW (ref 4.2–5.8)
RBC # FLD: 12.9 % — SIGNIFICANT CHANGE UP (ref 10.3–14.5)
RBC BLD AUTO: ABNORMAL
SARS-COV-2 IGG+IGM SERPL QL IA: >250 U/ML — HIGH
SARS-COV-2 IGG+IGM SERPL QL IA: POSITIVE
SODIUM SERPL-SCNC: 140 MMOL/L — SIGNIFICANT CHANGE UP (ref 135–145)
SPHEROCYTES BLD QL SMEAR: SLIGHT — SIGNIFICANT CHANGE UP
WBC # BLD: 15.58 K/UL — HIGH (ref 3.8–10.5)
WBC # FLD AUTO: 15.58 K/UL — HIGH (ref 3.8–10.5)

## 2021-09-24 PROCEDURE — 93010 ELECTROCARDIOGRAM REPORT: CPT

## 2021-09-24 PROCEDURE — 99239 HOSP IP/OBS DSCHRG MGMT >30: CPT

## 2021-09-24 RX ORDER — ASPIRIN/CALCIUM CARB/MAGNESIUM 324 MG
1 TABLET ORAL
Qty: 30 | Refills: 7
Start: 2021-09-24 | End: 2022-05-21

## 2021-09-24 RX ORDER — FUROSEMIDE 40 MG
1 TABLET ORAL
Qty: 0 | Refills: 0 | DISCHARGE

## 2021-09-24 RX ORDER — TICAGRELOR 90 MG/1
1 TABLET ORAL
Qty: 60 | Refills: 6
Start: 2021-09-24 | End: 2022-04-21

## 2021-09-24 RX ORDER — ASPIRIN/CALCIUM CARB/MAGNESIUM 324 MG
1 TABLET ORAL
Qty: 0 | Refills: 0 | DISCHARGE

## 2021-09-24 RX ADMIN — TICAGRELOR 90 MILLIGRAM(S): 90 TABLET ORAL at 06:26

## 2021-09-24 RX ADMIN — Medication 100 MILLIGRAM(S): at 06:25

## 2021-09-24 RX ADMIN — TENOFOVIR DISOPROXIL FUMARATE 25 MILLIGRAM(S): 300 TABLET, FILM COATED ORAL at 11:58

## 2021-09-24 RX ADMIN — Medication 2: at 07:52

## 2021-09-24 RX ADMIN — Medication 40 MILLIGRAM(S): at 06:26

## 2021-09-24 RX ADMIN — Medication 650 MILLIGRAM(S): at 11:58

## 2021-09-24 RX ADMIN — Medication 81 MILLIGRAM(S): at 11:58

## 2021-09-24 NOTE — DISCHARGE NOTE NURSING/CASE MANAGEMENT/SOCIAL WORK - PATIENT PORTAL LINK FT
You can access the FollowMyHealth Patient Portal offered by [facility] by registering at the following website: [email or web address]. By joining [employer]’s FollowMyHealth portal, you will also be able to view your health information using other applications (apps) compatible with our system.

## 2021-09-24 NOTE — PROVIDER CONTACT NOTE (CRITICAL VALUE NOTIFICATION) - ACTION/TREATMENT ORDERED:
cardiac NP Anneliese made aware and will look into all lab results. will decide and endorse to day RN if she wants repeat labs.

## 2021-09-24 NOTE — DISCHARGE NOTE NURSING/CASE MANAGEMENT/SOCIAL WORK - NSDCVIVACCINE_GEN_ALL_CORE_FT
influenza, injectable, quadrivalent, preservative free; 11-Nov-2020 12:36; Prince Vu (RN); [employer]; [id number] (Exp. Date: 30-Jun-2021); IntraMuscular; Deltoid Left.; 0.5 milliLiter(s); VIS (VIS Published: 15-Aug-2019, VIS Presented: 11-Nov-2020);

## 2021-09-30 DIAGNOSIS — Z91.041 RADIOGRAPHIC DYE ALLERGY STATUS: ICD-10-CM
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
## 2021-09-30 DIAGNOSIS — T82.855A STENOSIS OF CORONARY ARTERY STENT, INITIAL ENCOUNTER: ICD-10-CM

## 2021-09-30 DIAGNOSIS — Z95.5 PRESENCE OF CORONARY ANGIOPLASTY IMPLANT AND GRAFT: ICD-10-CM

## 2021-09-30 DIAGNOSIS — N18.9 CHRONIC KIDNEY DISEASE, UNSPECIFIED: ICD-10-CM
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
## 2021-09-30 DIAGNOSIS — K21.9 GASTRO-ESOPHAGEAL REFLUX DISEASE WITHOUT ESOPHAGITIS: ICD-10-CM

## 2021-09-30 DIAGNOSIS — I44.0 ATRIOVENTRICULAR BLOCK, FIRST DEGREE: ICD-10-CM

## 2021-09-30 DIAGNOSIS — I12.9 HYPERTENSIVE CHRONIC KIDNEY DISEASE WITH STAGE 1 THROUGH STAGE 4 CHRONIC KIDNEY DISEASE, OR UNSPECIFIED CHRONIC KIDNEY DISEASE: ICD-10-CM

## 2021-09-30 DIAGNOSIS — E78.5 HYPERLIPIDEMIA, UNSPECIFIED: ICD-10-CM

## 2021-09-30 DIAGNOSIS — Z79.84 LONG TERM (CURRENT) USE OF ORAL HYPOGLYCEMIC DRUGS: ICD-10-CM

## 2021-09-30 DIAGNOSIS — E11.22 TYPE 2 DIABETES MELLITUS WITH DIABETIC CHRONIC KIDNEY DISEASE: ICD-10-CM

## 2021-09-30 DIAGNOSIS — I25.10 ATHEROSCLEROTIC HEART DISEASE OF NATIVE CORONARY ARTERY WITHOUT ANGINA PECTORIS: ICD-10-CM

## 2021-09-30 DIAGNOSIS — I45.10 UNSPECIFIED RIGHT BUNDLE-BRANCH BLOCK: ICD-10-CM

## 2021-09-30 DIAGNOSIS — I25.110 ATHEROSCLEROTIC HEART DISEASE OF NATIVE CORONARY ARTERY WITH UNSTABLE ANGINA PECTORIS: ICD-10-CM

## 2021-10-04 ENCOUNTER — APPOINTMENT (OUTPATIENT)
Dept: CARDIOLOGY | Facility: CLINIC | Age: 86
End: 2021-10-04
Payer: MEDICARE

## 2021-10-04 VITALS
OXYGEN SATURATION: 96 % | BODY MASS INDEX: 25.98 KG/M2 | SYSTOLIC BLOOD PRESSURE: 119 MMHG | TEMPERATURE: 98.1 F | DIASTOLIC BLOOD PRESSURE: 66 MMHG | WEIGHT: 133 LBS | RESPIRATION RATE: 18 BRPM | HEART RATE: 61 BPM

## 2021-10-04 PROCEDURE — 99214 OFFICE O/P EST MOD 30 MIN: CPT

## 2021-10-04 PROCEDURE — 93000 ELECTROCARDIOGRAM COMPLETE: CPT

## 2021-10-04 NOTE — HISTORY OF PRESENT ILLNESS
[FreeTextEntry1] :  \par 1. HTN: on medications, controlled.\par 2. Hyperlipidemia: on statin. No muscle pain is noted.\par 3. CAD s/p PCI: patient has had mutiple PCIs. He underwent cardiac cath at [facility] on 9/25/18 and he had ISR of prox and mid LAD stents. He underwent repeat cardiac cath [facility] 6/2019 and was found to have ISR to the LAD and had PCI. \par He was admitted to [facility] in 6/2019 due to hypotension. \par He was readmitted to [facility] due to PNA in 7/2020. \par  \par He underwent repeat cardiac catheterization that showed severe prox LAD stenosis and mid LAD stenosis. He underwent PCI and after confirming that his renal function was stable, he was discharged on 8-11-21. \par  \par 4. MR: He had an echo at the last visit that showed moderate mitral regurgitation. The patient denies CP or SOB.\par No cough or fevers noted.\par \par 5. SOB: He is on lasix 40 QD with 40 at night on MWF and his SOB and leg edema have improved.\par  \par Patient received both doses of his COVID vaccine. \par \par He underwent brachytherapy of the LAD lesion. He is on ASA and Brilinta and reports improvement in CP.\par \par His CKD is stable.\par

## 2021-10-04 NOTE — DISCUSSION/SUMMARY
[FreeTextEntry1] : 86 M HTN hyperlipidemia CKD CAD s/p PCI for f/u after brachytherapy.\par Continue medications for HTN and hyperlipidemia.\par Monitor CKF for now.\par Continue ASA 81 and Brilinta 90 BID for CAD s/p PCI.\par Continue to monitor symptoms.

## 2021-10-04 NOTE — PHYSICAL EXAM
[Well Developed] : well developed [Well Nourished] : well nourished [No Acute Distress] : no acute distress [Normal Conjunctiva] : normal conjunctiva [Normal Venous Pressure] : normal venous pressure [No Carotid Bruit] : no carotid bruit [Normal S1, S2] : normal S1, S2 [No Rub] : no rub [No Gallop] : no gallop [Clear Lung Fields] : clear lung fields [Good Air Entry] : good air entry [No Respiratory Distress] : no respiratory distress  [Soft] : abdomen soft [Non Tender] : non-tender [No Masses/organomegaly] : no masses/organomegaly [Normal Bowel Sounds] : normal bowel sounds [Normal Gait] : normal gait [No Edema] : no edema [No Cyanosis] : no cyanosis [No Clubbing] : no clubbing [No Varicosities] : no varicosities [No Rash] : no rash [No Skin Lesions] : no skin lesions [Moves all extremities] : moves all extremities [No Focal Deficits] : no focal deficits [Normal Speech] : normal speech [Alert and Oriented] : alert and oriented [Normal memory] : normal memory [de-identified] : 2/6 BELLA CHING

## 2021-10-26 ENCOUNTER — RX RENEWAL (OUTPATIENT)
Age: 86
End: 2021-10-26

## 2021-10-26 RX ORDER — TICAGRELOR 90 MG/1
90 TABLET ORAL TWICE DAILY
Qty: 60 | Refills: 5 | Status: ACTIVE | COMMUNITY
Start: 2021-10-26 | End: 1900-01-01

## 2021-12-20 ENCOUNTER — APPOINTMENT (OUTPATIENT)
Dept: CARDIOLOGY | Facility: CLINIC | Age: 86
End: 2021-12-20
Payer: MEDICARE

## 2021-12-20 VITALS
DIASTOLIC BLOOD PRESSURE: 66 MMHG | HEART RATE: 59 BPM | SYSTOLIC BLOOD PRESSURE: 107 MMHG | WEIGHT: 136 LBS | OXYGEN SATURATION: 93 % | BODY MASS INDEX: 26.56 KG/M2 | RESPIRATION RATE: 18 BRPM | TEMPERATURE: 98.1 F

## 2021-12-20 PROCEDURE — 93000 ELECTROCARDIOGRAM COMPLETE: CPT

## 2021-12-20 PROCEDURE — 99214 OFFICE O/P EST MOD 30 MIN: CPT

## 2021-12-20 RX ORDER — TICAGRELOR 60 MG/1
60 TABLET ORAL
Qty: 60 | Refills: 0 | Status: ACTIVE | COMMUNITY
Start: 2021-07-06

## 2021-12-20 RX ORDER — PREDNISONE 50 MG/1
50 TABLET ORAL
Qty: 3 | Refills: 0 | Status: ACTIVE | COMMUNITY
Start: 2021-09-20

## 2021-12-20 RX ORDER — SODIUM ZIRCONIUM CYCLOSILICATE 10 G/10G
10 POWDER, FOR SUSPENSION ORAL
Qty: 30 | Refills: 0 | Status: ACTIVE | COMMUNITY
Start: 2021-11-23

## 2021-12-20 RX ORDER — ISOPROPYL ALCOHOL 0.75 G/1
SWAB TOPICAL
Qty: 100 | Refills: 0 | Status: ACTIVE | COMMUNITY
Start: 2021-11-03

## 2021-12-20 NOTE — DISCUSSION/SUMMARY
[FreeTextEntry1] : 86 M HTN hyperlipidemia CKD CAD s/p PCI for f/u after brachytherapy.\par Continue ASA 81 and Brilinta 90 BID for CAD s/p PCI.\par Continue medications for HTN.\par Continue statin for hyperlipidemia.\par Continue DM control.\par Continue lasix for leg edema.\par

## 2021-12-20 NOTE — PHYSICAL EXAM
[Well Developed] : well developed [Well Nourished] : well nourished [No Acute Distress] : no acute distress [Normal Conjunctiva] : normal conjunctiva [Normal Venous Pressure] : normal venous pressure [No Carotid Bruit] : no carotid bruit [Normal S1, S2] : normal S1, S2 [No Rub] : no rub [No Gallop] : no gallop [Clear Lung Fields] : clear lung fields [Good Air Entry] : good air entry [No Respiratory Distress] : no respiratory distress  [Soft] : abdomen soft [Non Tender] : non-tender [No Masses/organomegaly] : no masses/organomegaly [Normal Bowel Sounds] : normal bowel sounds [Normal Gait] : normal gait [No Edema] : no edema [No Cyanosis] : no cyanosis [No Clubbing] : no clubbing [No Varicosities] : no varicosities [No Rash] : no rash [No Skin Lesions] : no skin lesions [Moves all extremities] : moves all extremities [No Focal Deficits] : no focal deficits [Normal Speech] : normal speech [Alert and Oriented] : alert and oriented [Normal memory] : normal memory [de-identified] : 2/6 BELLA CHING

## 2021-12-20 NOTE — HISTORY OF PRESENT ILLNESS
[FreeTextEntry1] :  \par 1. HTN: on medications, no SE noted.\par 2. Hyperlipidemia: on statin. \par 3. CAD s/p PCI: patient has had mutiple PCIs. He underwent cardiac cath at [facility] on 9/25/18 and he had ISR of prox and mid LAD stents. He underwent repeat cardiac cath [facility] 6/2019 and was found to have ISR to the LAD and had PCI. \par He was admitted to [facility] in 6/2019 due to hypotension. \par He was readmitted to [facility] due to PNA in 7/2020. \par  \par He also had repeat cardiac catheterization that showed severe prox LAD stenosis and mid LAD stenosis. He underwent PCI in 8/2021. \par  \par 4. MR: moderate MR on echo in 2021.\par \par 5. Edema: He is on lasix 40 QD with 40 at night on MWF. \par  \par Patient received both doses of his COVID vaccine. \par  \par  \par

## 2021-12-20 NOTE — REASON FOR VISIT
[Structural Heart and Valve Disease] : structural heart and valve disease [Hyperlipidemia] : hyperlipidemia [Hypertension] : hypertension [Coronary Artery Disease] : coronary artery disease [FreeTextEntry1] : 86 M HTN hyperlipidemia DM CKD Cr 1.9 CAD s/p PCI for f/u.\par \par

## 2022-01-18 NOTE — PATIENT PROFILE ADULT - NSPROSPHOSPCHAPLAINYN_GEN_A_NUR
Pt updated on plan of care, notified she will be transported to [facility]. Verbalized understanding. Pt calm and cooperative.    no

## 2022-01-19 ENCOUNTER — RX RENEWAL (OUTPATIENT)
Age: 87
End: 2022-01-19

## 2022-01-19 RX ORDER — NITROGLYCERIN 0.4 MG/1
0.4 TABLET SUBLINGUAL
Qty: 100 | Refills: 2 | Status: ACTIVE | COMMUNITY
Start: 2018-09-17 | End: 1900-01-01

## 2022-01-20 PROCEDURE — C1725: CPT

## 2022-01-20 PROCEDURE — 80053 COMPREHEN METABOLIC PANEL: CPT

## 2022-01-20 PROCEDURE — C1887: CPT

## 2022-01-20 PROCEDURE — 77470 SPECIAL RADIATION TREATMENT: CPT

## 2022-01-20 PROCEDURE — 80061 LIPID PANEL: CPT

## 2022-01-20 PROCEDURE — C1885: CPT

## 2022-01-20 PROCEDURE — 82962 GLUCOSE BLOOD TEST: CPT

## 2022-01-20 PROCEDURE — C1769: CPT

## 2022-01-20 PROCEDURE — 83735 ASSAY OF MAGNESIUM: CPT

## 2022-01-20 PROCEDURE — C1728: CPT

## 2022-01-20 PROCEDURE — 77316 BRACHYTX ISODOSE PLAN SIMPLE: CPT

## 2022-01-20 PROCEDURE — 36415 COLL VENOUS BLD VENIPUNCTURE: CPT

## 2022-01-20 PROCEDURE — 82553 CREATINE MB FRACTION: CPT

## 2022-01-20 PROCEDURE — 77770 HDR RDNCL NTRSTL/ICAV BRCHTX: CPT

## 2022-01-20 PROCEDURE — 82550 ASSAY OF CK (CPK): CPT

## 2022-01-20 PROCEDURE — 93005 ELECTROCARDIOGRAM TRACING: CPT

## 2022-01-20 PROCEDURE — C8929: CPT

## 2022-01-20 PROCEDURE — 83036 HEMOGLOBIN GLYCOSYLATED A1C: CPT

## 2022-01-20 PROCEDURE — 77370 RADIATION PHYSICS CONSULT: CPT

## 2022-01-20 PROCEDURE — 85730 THROMBOPLASTIN TIME PARTIAL: CPT

## 2022-01-20 PROCEDURE — C1760: CPT

## 2022-01-20 PROCEDURE — C1753: CPT

## 2022-01-20 PROCEDURE — 92924 PRQ TRLUML C ATHRC 1 ART&/BR: CPT

## 2022-01-20 PROCEDURE — 93460 R&L HRT ART/VENTRICLE ANGIO: CPT

## 2022-01-20 PROCEDURE — 92925: CPT

## 2022-01-20 PROCEDURE — 85025 COMPLETE CBC W/AUTO DIFF WBC: CPT

## 2022-01-20 PROCEDURE — C1717: CPT

## 2022-01-20 PROCEDURE — C1894: CPT

## 2022-01-20 PROCEDURE — 85610 PROTHROMBIN TIME: CPT

## 2022-01-20 PROCEDURE — 92978 ENDOLUMINL IVUS OCT C 1ST: CPT

## 2022-01-20 PROCEDURE — 86769 SARS-COV-2 COVID-19 ANTIBODY: CPT

## 2022-01-20 PROCEDURE — 77799 UNLISTED PX CLIN BRACHYTX: CPT

## 2022-01-20 PROCEDURE — 77290 THER RAD SIMULAJ FIELD CPLX: CPT

## 2022-01-26 ENCOUNTER — RX RENEWAL (OUTPATIENT)
Age: 87
End: 2022-01-26

## 2022-01-26 RX ORDER — FUROSEMIDE 40 MG/1
40 TABLET ORAL DAILY
Qty: 45 | Refills: 0 | Status: ACTIVE | COMMUNITY
Start: 2020-12-14 | End: 1900-01-01

## 2022-03-21 ENCOUNTER — APPOINTMENT (OUTPATIENT)
Dept: CARDIOLOGY | Facility: CLINIC | Age: 87
End: 2022-03-21

## 2022-03-21 NOTE — PHYSICAL THERAPY INITIAL EVALUATION ADULT - GAIT DISTANCE, PT EVAL
The patient was provided a virtual link to view the pre-operative Joint Replacement Class Video  A Patient Education Book specific to total hip or knee joint replacement surgery was given to the patient in [facility]. The content of the class was presented using an audio power point presentation specific for patients undergoing total hip and knee replacement surgery. Incentive spirometer and CHG bath kits were verbally reviewed. Day of surgery routine and expectations, hospital routine and expectations, nutrition, alcohol, nicotine, medications, infection control, pain management, DVT precautions and equipment, ice therapy, durable medical equipment, exercises, mobility expectations and precautions, home preparation and safety were reviewed in class video. My contact information was shared with the patient to provide further information as requested by the patient related to their upcoming surgery. Patient's wife sent email confirmation that they viewed the joint class video. 100 feet

## 2022-05-26 ENCOUNTER — APPOINTMENT (OUTPATIENT)
Dept: CARDIOLOGY | Facility: CLINIC | Age: 87
End: 2022-05-26
Payer: MEDICARE

## 2022-05-26 VITALS
HEART RATE: 63 BPM | OXYGEN SATURATION: 95 % | SYSTOLIC BLOOD PRESSURE: 134 MMHG | WEIGHT: 136.8 LBS | BODY MASS INDEX: 26.72 KG/M2 | TEMPERATURE: 97.8 F | DIASTOLIC BLOOD PRESSURE: 64 MMHG

## 2022-05-26 PROCEDURE — 99214 OFFICE O/P EST MOD 30 MIN: CPT

## 2022-05-26 PROCEDURE — 93000 ELECTROCARDIOGRAM COMPLETE: CPT

## 2022-05-26 NOTE — REASON FOR VISIT
[Hyperlipidemia] : hyperlipidemia [Hypertension] : hypertension [Coronary Artery Disease] : coronary artery disease [FreeTextEntry1] : 86 M HTN hyperlipidemia DM CKD Cr 1.9 CAD s/p PCI for f/u.\par \par

## 2022-05-26 NOTE — DISCUSSION/SUMMARY
[FreeTextEntry1] : 86 M HTN hyperlipidemia CKD CAD s/p PCI for f/u.\par Continue ASA 81 and Brilinta 90 BID for CAD s/p PCI.\par Continue medications for HTN and hyperlipidemia.\par Continue NG prn for CP.\par Continue DM control.\par Continue exercise and diet.

## 2022-05-26 NOTE — PHYSICAL EXAM
[Well Developed] : well developed [Well Nourished] : well nourished [No Acute Distress] : no acute distress [Normal Conjunctiva] : normal conjunctiva [Normal Venous Pressure] : normal venous pressure [No Carotid Bruit] : no carotid bruit [Normal S1, S2] : normal S1, S2 [No Rub] : no rub [No Gallop] : no gallop [Clear Lung Fields] : clear lung fields [Good Air Entry] : good air entry [No Respiratory Distress] : no respiratory distress  [Soft] : abdomen soft [Non Tender] : non-tender [No Masses/organomegaly] : no masses/organomegaly [Normal Bowel Sounds] : normal bowel sounds [Normal Gait] : normal gait [No Edema] : no edema [No Cyanosis] : no cyanosis [No Clubbing] : no clubbing [No Varicosities] : no varicosities [No Rash] : no rash [No Skin Lesions] : no skin lesions [Moves all extremities] : moves all extremities [No Focal Deficits] : no focal deficits [Normal Speech] : normal speech [Alert and Oriented] : alert and oriented [Normal memory] : normal memory [de-identified] : 2/6 BELLA CHING

## 2022-05-26 NOTE — HISTORY OF PRESENT ILLNESS
[FreeTextEntry1] :  \par 1. HTN: on medications, controlled.\par 2. Hyperlipidemia: on statin. No muscle pain is noted.\par 3. CAD s/p PCI: patient has had mutiple PCIs. He underwent cardiac cath at [facility] on 9/25/18 and he had ISR of prox and mid LAD stents. He underwent repeat cardiac cath [facility] 6/2019 and was found to have ISR to the LAD and had PCI. \par He was admitted to [facility] in 6/2019 due to hypotension. \par He was readmitted to [facility] due to PNA in 7/2020. \par  \par He also had repeat cardiac catheterization that showed severe prox LAD stenosis and mid LAD stenosis. He underwent PCI in 8/2021 and also had brachytherapy.  \par  \par 4. MR: moderate MR on echo in 2021.\par \par 5. Edema: He is on lasix 40 QD with 40 at night on MWF. \par  \par Patient received both doses of his COVID vaccine. \par \par He has had 1-2 episodes of CP per week and his symptoms are improved with NG.\par

## 2022-07-13 RX ORDER — ASPIRIN 81 MG/1
81 TABLET, COATED ORAL
Qty: 30 | Refills: 4 | Status: ACTIVE | COMMUNITY
Start: 2021-10-26 | End: 1900-01-01

## 2022-11-22 NOTE — PROGRESS NOTE ADULT - ASSESSMENT
Home 1. Anemia  2. Melena  3. Stool positive for occult blood  4. R/o Peptic ulcer disease  5. R/o AVM's  6. R/o Right side colonic bleeding  7. Gall stone R/o cholecystitis    Suggestions:  1. Monitor H/H  2. Transfuse PRBC as needed  3. Protonix IV  4. NPO  5. Follow up stool study  6. Monitor Electrolytes  7. Antibiotics IV  8. EGD  9. HIDA Scan  10. Surgical follow up  11. IVF hydration  12. Avoid NSAID  13. DVT prophylaxis

## 2023-07-19 NOTE — H&P ADULT - PROBLEM/PLAN-2
Past Medical History:   Diagnosis Date    Afib     COPD (chronic obstructive pulmonary disease)     Diabetes mellitus     Hypertension        Past Surgical History:   Procedure Laterality Date    APPENDECTOMY      DEBRIDEMENT OF FOOT Bilateral 2023    Procedure: DEBRIDEMENT, FOOT;  Surgeon: Raghavendra Mendoza DO;  Location: [facility] OR;  Service: General;  Laterality: Bilateral;    DEBRIDEMENT OF LOWER EXTREMITY Left 2023    Procedure: DEBRIDEMENT LEFT GREAT TOE; AMPUTATION;  Surgeon: Raghavendra Mendoza DO;  Location: [facility] OR;  Service: General;  Laterality: Left;    SKIN BIOPSY      TONSILLECTOMY         Review of patient's allergies indicates:  No Known Allergies    No current facility-administered medications on file prior to encounter.     Current Outpatient Medications on File Prior to Encounter   Medication Sig    albuterol-ipratropium (DUO-NEB) 2.5 mg-0.5 mg/3 mL nebulizer solution Take 3 mLs by nebulization every 6 (six) hours as needed for Wheezing or Shortness of Breath. Rescue    amiodarone (PACERONE) 200 MG Tab 2 tabs bid for 5 days then 1 po daily    apixaban (ELIQUIS) 5 mg Tab Take 5 mg by mouth 2 (two) times daily.    [] doxycycline (VIBRAMYCIN) 100 MG Cap Take 1 capsule (100 mg total) by mouth 2 (two) times daily. for 14 days    hydrALAZINE (APRESOLINE) 25 MG tablet Take 1 tablet (25 mg total) by mouth 3 (three) times daily.    insulin glargine,hum.rec.anlog (LANTUS U-100 INSULIN SUBQ) Inject 54 Units into the skin Daily.    silver sulfADIAZINE 1% (SILVADENE) 1 % cream Apply topically once daily.    [DISCONTINUED] aspirin 325 MG tablet 1 tablet Orally Once a day for 30 day(s)    [DISCONTINUED] furosemide (LASIX) 40 MG tablet Take 40 mg by mouth.     Family History       Problem Relation (Age of Onset)    Cancer Mother    Diabetes Mother    No Known Problems Father          Tobacco Use    Smoking status: Every Day     Packs/day: 1.00     Years: 35.00     Pack years: 35.00      Types: Cigarettes    Smokeless tobacco: Never   Substance and Sexual Activity    Alcohol use: Never    Drug use: Never    Sexual activity: Not Currently     Review of Systems   Constitutional:  Positive for fever. Negative for chills, fatigue and unexpected weight change.   HENT:  Negative for congestion, mouth sores and sore throat.    Eyes:  Negative for photophobia and visual disturbance.   Respiratory:  Positive for shortness of breath. Negative for cough, chest tightness and wheezing.    Cardiovascular:  Positive for chest pain and leg swelling. Negative for palpitations.   Gastrointestinal:  Negative for abdominal pain, diarrhea, nausea and vomiting.   Endocrine: Negative for cold intolerance and heat intolerance.   Genitourinary:  Positive for decreased urine volume. Negative for difficulty urinating, dysuria, frequency and urgency.   Musculoskeletal:  Negative for arthralgias, back pain and myalgias.   Skin:  Negative for pallor and rash.   Neurological:  Negative for tremors, seizures, syncope, weakness, numbness and headaches.   Hematological:  Does not bruise/bleed easily.   Psychiatric/Behavioral:  Negative for agitation, confusion, hallucinations and suicidal ideas.    Objective:     Vital Signs (Most Recent):  Temp: 97.3 °F (36.3 °C) (07/18/23 1146)  Pulse: 80 (07/18/23 2030)  Resp: 15 (07/18/23 2030)  BP: 110/76 (07/18/23 2023)  SpO2: (!) 93 % (07/18/23 2013) Vital Signs (24h Range):  Temp:  [97.3 °F (36.3 °C)] 97.3 °F (36.3 °C)  Pulse:  [71-82] 80  Resp:  [12-20] 15  SpO2:  [85 %-98 %] 93 %  BP: (100-144)/(60-94) 110/76     Weight: 108.9 kg (240 lb)  Body mass index is 32.55 kg/m².     Physical Exam  Vitals reviewed.   Constitutional:       Appearance: Normal appearance. He is ill-appearing.   HENT:      Head: Normocephalic and atraumatic.      Nose: Nose normal.   Eyes:      Extraocular Movements: Extraocular movements intact.      Conjunctiva/sclera: Conjunctivae normal.   Neck:      Trachea:  Trachea normal.   Cardiovascular:      Rate and Rhythm: Normal rate. Rhythm irregular.      Pulses: Normal pulses.      Heart sounds: Normal heart sounds.   Pulmonary:      Effort: Respiratory distress present.      Breath sounds: Normal breath sounds and air entry.   Abdominal:      General: Bowel sounds are normal.      Palpations: Abdomen is soft.   Musculoskeletal:      Cervical back: Neck supple.      Right lower leg: Edema present.      Left lower leg: Edema present.      Comments: Moves all extremities, normal tone   Skin:     General: Skin is warm and dry.      Capillary Refill: Capillary refill takes less than 2 seconds.      Findings: Lesion (bilateral toes) present.   Neurological:      General: No focal deficit present.      Mental Status: He is alert and oriented to person, place, and time.      Cranial Nerves: Cranial nerves 2-12 are intact.      Comments: Grossly normal motor and sensory function without focal deficit appreciated.   Psychiatric:         Mood and Affect: Mood and affect normal.         Behavior: Behavior is cooperative.            Significant Labs: All pertinent labs within the past 24 hours have been reviewed.    Significant Imaging: I have reviewed all pertinent imaging results/findings within the past 24 hours.   DISPLAY PLAN FREE TEXT

## 2023-09-04 NOTE — DISCHARGE NOTE NURSING/CASE MANAGEMENT/SOCIAL WORK - NSDCPETBCESMAN_GEN_ALL_CORE
If you are a smoker, it is important for your health to stop smoking. Please be aware that second hand smoke is also harmful. 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    - advance to CLD and advance diet as tolerated  - consider urine culture and treating possible UTI  - feel free to call us back with any questions or concerns    Jose De Jesus Celeste MD  Attending Physician

## 2024-05-14 NOTE — CONSULT NOTE ADULT - PROBLEM SELECTOR PROBLEM 2
Spinal stenosis of lumbar region, unspecified whether neurogenic claudication present Palliative Care